# Patient Record
Sex: FEMALE | Race: WHITE | NOT HISPANIC OR LATINO | Employment: OTHER | URBAN - METROPOLITAN AREA
[De-identification: names, ages, dates, MRNs, and addresses within clinical notes are randomized per-mention and may not be internally consistent; named-entity substitution may affect disease eponyms.]

---

## 2018-02-27 ENCOUNTER — OFFICE VISIT CONVERTED (OUTPATIENT)
Dept: PULMONOLOGY | Facility: CLINIC | Age: 58
End: 2018-02-27
Attending: INTERNAL MEDICINE

## 2018-08-22 ENCOUNTER — OFFICE VISIT CONVERTED (OUTPATIENT)
Dept: PULMONOLOGY | Facility: CLINIC | Age: 58
End: 2018-08-22
Attending: INTERNAL MEDICINE

## 2018-11-09 ENCOUNTER — CONVERSION ENCOUNTER (OUTPATIENT)
Dept: OTHER | Facility: HOSPITAL | Age: 58
End: 2018-11-09

## 2018-12-13 ENCOUNTER — OFFICE VISIT CONVERTED (OUTPATIENT)
Dept: PULMONOLOGY | Facility: CLINIC | Age: 58
End: 2018-12-13
Attending: INTERNAL MEDICINE

## 2019-02-19 ENCOUNTER — HOSPITAL ENCOUNTER (OUTPATIENT)
Dept: PERIOP | Facility: HOSPITAL | Age: 59
Setting detail: HOSPITAL OUTPATIENT SURGERY
Discharge: HOME | End: 2019-02-19
Attending: UROLOGY

## 2019-02-27 ENCOUNTER — CONVERSION ENCOUNTER (OUTPATIENT)
Dept: SURGERY | Facility: CLINIC | Age: 59
End: 2019-02-27

## 2019-02-27 ENCOUNTER — HOSPITAL ENCOUNTER (OUTPATIENT)
Dept: SURGERY | Facility: CLINIC | Age: 59
Discharge: HOME | End: 2019-02-27
Attending: UROLOGY

## 2019-02-27 ENCOUNTER — OFFICE VISIT CONVERTED (OUTPATIENT)
Dept: UROLOGY | Facility: CLINIC | Age: 59
End: 2019-02-27
Attending: UROLOGY

## 2019-03-01 LAB
BACTERIA UR CULT: ABNORMAL
CEFEPIME SUSC ISLT: <=1
CEFTAZIDIME SUSC ISLT: <=1
CIPROFLOXACIN SUSC ISLT: <=0.25
GENTAMICIN SUSC ISLT: <=1
LEVOFLOXACIN SUSC ISLT: 0.25
TOBRAMYCIN SUSC ISLT: <=1

## 2019-04-24 ENCOUNTER — HOSPITAL ENCOUNTER (OUTPATIENT)
Dept: CT IMAGING | Facility: HOSPITAL | Age: 59
Discharge: HOME | End: 2019-04-24
Attending: INTERNAL MEDICINE

## 2019-04-24 ENCOUNTER — OFFICE VISIT CONVERTED (OUTPATIENT)
Dept: PULMONOLOGY | Facility: CLINIC | Age: 59
End: 2019-04-24
Attending: INTERNAL MEDICINE

## 2019-06-24 ENCOUNTER — OFFICE VISIT CONVERTED (OUTPATIENT)
Dept: PULMONOLOGY | Facility: CLINIC | Age: 59
End: 2019-06-24
Attending: PHYSICIAN ASSISTANT

## 2019-11-18 ENCOUNTER — HOSPITAL ENCOUNTER (OUTPATIENT)
Dept: GENERAL RADIOLOGY | Facility: HOSPITAL | Age: 59
Discharge: HOME | End: 2019-11-18
Attending: FAMILY MEDICINE

## 2019-11-18 LAB
CHOLEST SERPL-MCNC: 180 MG/DL (ref 107–200)
CHOLEST/HDLC SERPL: 2.2 {RATIO} (ref 3–6)
HDLC SERPL-MCNC: 81 MG/DL (ref 40–60)
LDLC SERPL CALC-MCNC: 78 MG/DL (ref 70–100)
TRIGL SERPL-MCNC: 105 MG/DL (ref 40–150)
VLDLC SERPL-MCNC: 21 MG/DL (ref 5–37)

## 2019-12-17 ENCOUNTER — HOSPITAL ENCOUNTER (OUTPATIENT)
Dept: MAMMOGRAPHY | Facility: HOSPITAL | Age: 59
Discharge: HOME | End: 2019-12-17
Attending: FAMILY MEDICINE

## 2020-05-26 ENCOUNTER — HOSPITAL ENCOUNTER (OUTPATIENT)
Dept: GENERAL RADIOLOGY | Facility: HOSPITAL | Age: 60
Discharge: HOME | End: 2020-05-26
Attending: INTERNAL MEDICINE

## 2021-02-05 ENCOUNTER — HOSPITAL ENCOUNTER (OUTPATIENT)
Dept: MAMMOGRAPHY | Facility: HOSPITAL | Age: 61
Discharge: HOME | End: 2021-02-05
Attending: FAMILY MEDICINE

## 2021-05-16 VITALS
SYSTOLIC BLOOD PRESSURE: 122 MMHG | DIASTOLIC BLOOD PRESSURE: 82 MMHG | HEIGHT: 65 IN | BODY MASS INDEX: 31.86 KG/M2 | WEIGHT: 191.25 LBS

## 2021-05-28 VITALS
RESPIRATION RATE: 16 BRPM | SYSTOLIC BLOOD PRESSURE: 148 MMHG | HEIGHT: 64 IN | SYSTOLIC BLOOD PRESSURE: 130 MMHG | TEMPERATURE: 98.4 F | TEMPERATURE: 98.2 F | TEMPERATURE: 98.4 F | HEIGHT: 64 IN | DIASTOLIC BLOOD PRESSURE: 58 MMHG | HEART RATE: 86 BPM | DIASTOLIC BLOOD PRESSURE: 72 MMHG | HEIGHT: 65 IN | WEIGHT: 196.25 LBS | SYSTOLIC BLOOD PRESSURE: 146 MMHG | RESPIRATION RATE: 16 BRPM | BODY MASS INDEX: 33.56 KG/M2 | WEIGHT: 201.19 LBS | RESPIRATION RATE: 16 BRPM | HEART RATE: 95 BPM | WEIGHT: 208 LBS | DIASTOLIC BLOOD PRESSURE: 86 MMHG | OXYGEN SATURATION: 94 % | DIASTOLIC BLOOD PRESSURE: 78 MMHG | BODY MASS INDEX: 35.51 KG/M2 | SYSTOLIC BLOOD PRESSURE: 130 MMHG | RESPIRATION RATE: 16 BRPM | OXYGEN SATURATION: 93 % | BODY MASS INDEX: 32.7 KG/M2 | TEMPERATURE: 98.4 F | HEIGHT: 65 IN | HEART RATE: 96 BPM | WEIGHT: 196.56 LBS | BODY MASS INDEX: 33.52 KG/M2 | OXYGEN SATURATION: 97 % | HEART RATE: 93 BPM | OXYGEN SATURATION: 96 %

## 2021-05-28 VITALS
WEIGHT: 194.56 LBS | DIASTOLIC BLOOD PRESSURE: 59 MMHG | HEIGHT: 65 IN | SYSTOLIC BLOOD PRESSURE: 152 MMHG | BODY MASS INDEX: 32.42 KG/M2 | OXYGEN SATURATION: 96 % | TEMPERATURE: 98.4 F | HEART RATE: 83 BPM | RESPIRATION RATE: 12 BRPM

## 2021-05-28 NOTE — PROGRESS NOTES
Patient: DENI HOLLEY     Acct: [id number]     Report: #[id number]  UNIT #: [id number]     : 1960    Encounter Date:2018  PRIMARY CARE: BJ DOMINGUEZ  ***Signed***  --------------------------------------------------------------------------------------------------------------------  Chief Complaint      Encounter Date      Aug 22, 2018            Primary Care Provider      BJ DOMINGUEZ            Referring Provider      SELF,REFERRED PATIENT            [facility] follow-up            Patient Complaint      Patient is complaining of      6 month follow up/copd f/u            VITALS      Height 5 ft 4.00 in / 162.56 cm      Weight 208 lbs  / 94.792333 kg      BSA 2.11 m2      BMI 35.7 kg/m2      Temperature 98.4 F / 36.89 C - Oral      Pulse 93      Respirations 16      Blood Pressure 146/78 Sitting, Right Arm      Pulse Oximetry 93%, nasal cannula, 2.5 lpm      Comment: Resting Room Air 96%      Ambulation Room Air Sp02 82%      Ambulation Sp02 98% on 2 liters      Exhaled Nitrous Oxide Testin            HPI      The patient is a 57 year old female with very severe COPD with left lower lobe     pneumonia and recent COPD exacerbations.  She is here for follow up.  Since her     last office visit, she was prescribed Spiriva and Symbicort, but insurance was     not covering her medications. She is currently using nebulizer several times a     day and ProAir albuterol 3-4 times a day.  She has not been smoking since     2017.  She has not needed antibiotics or steroids.  She has no fever, chills,    weight loss or loss of appetite. No coughing up blood, no nausea or vomiting.      She is shortness of breath with minimal activities, wants to change her oxygen     tank to portable oxygen concentrator.  She always feels like she has thrush with    inhaled steroids.  She is waiting to try different one now.  She has a sore     throat with inhalers as well.  Her recent CT scan while she was in the  hospital     for COPD exacerbation showed a 0.3 cm lung nodule and mild emphysematous     changes.  We discussed regarding pulmonary rehab and pulmonary transplant.            ROS      Constitutional:  Complains of: Fatigue; Denies: Fever, Weight gain, Weight loss,    Chills, Insomnia, Other      Respiratory/Breathing:  Complains of: Shortness of air, Wheezing, Cough; Denies:    Hemoptysis, Pleuritic pain, Other      Endocrine:  Denies: Polydipsia, Polyuria, Heat/cold intolerance, Abnorml     menstrual pattern, Diabetes, Other      Eyes:  Denies: Blurred vision, Vision Changes, Other      Ears, nose, mouth, throat:  Denies: Mouth lesions, Thrush, Throat pain,     Hoarseness, Allergies/Hay Fever, Post Nasal Drip, Headaches, Recent Head Injury,    Nose Bleeding, Neck Stiffness, Thyroid Mass, Hearing Loss, Ear Fullness, Dry     Mouth, Nasal or Sinus Pain, Dry Lips, Nasal discharge, Nasal congestion, Other      Cardiovascular:  Denies: Palpitations, Syncope, Claudication, Chest Pain, Wake     up Gasping for air, Leg Swelling, Irregular Heart Rate, Cyanosis, Dyspnea on     Exertion, Other      Gastrointestinal:  Denies: Nausea, Constipation, Diarrhea, Abdominal pain,     Vomiting, Difficulty Swallowing, Reflux/Heartburn, Dysphagia, Jaundice,     Bloating, Melena, Bloody stools, Other      Genitourinary:  Denies: Urinary frequency, Incontinence, Hematuria, Urgency,     Nocturia, Dysuria, Testicular problems, Other      Musculoskeletal:  Denies: Joint Pain, Joint Stiffness, Joint Swelling, Myalgias,    Other      Hematologic/lymphatic:  DENIES: Lymphadenopathy, Bruising, Bleeding tendencies,     Other      Neurological:  Denies: Headache, Numbness, Weakness, Seizures, Other      Psychiatric:  Denies: Anxiety, Appropriate Effect, Depression, Other      Sleep:  No: Excessive daytime sleep, Morning Headache?, Snoring, Insomnia?, Stop    breathing at sleep?, Other      Integumentary:  Denies: Rash, Dry skin, Skin Warm to  Touch, Other      Immunologic/Allergic:  Denies: Latex allergy, Seasonal allergies, Asthma,     Urticaria, Eczema, Other      Immunization status:  No: Up to date            FAMILY/SOCIAL/MEDICAL HX      Surgical History:  Yes: Abdominal Surgery (WOUND INFECTION DEHISCENCE 9/2011     DEBRIDEMENT W/WOUND VAC), Appendectomy (1980'S), Bladder Surgery (ANTERIOR AND     POSTERIOR REPAIR 1980'S), Bowel Surgery (COLONOSCOPY COLON RESECTION 8/24/2011),    Cholecystectomy (6/15/15), Head Surgery (TONSILLECTOMY, SINUS), Nose Surgery     (SINUS SURGERY), Oral Surgery (DENTAL EXTRACTION), Orthopedic Surgery (LEFT     SHOULDER X2), Throat Surgery (tonsilectomy), Other Surgeries; No: AAA Repair,     Angioplasty, Back Surgery, Breast Surgery, CABG, Carotid Stenosis, Ear Surgery,     Eye Surgery, Hernia Surgery, Kidney Surgery, Prostatectomy, Rectal Surgery,     Spinal Surgery, Testicular Surgery, Valve Replacement, Vascular Surgery      Heart - Family Hx:  Father, Sister      Diabetes - Family Hx:  Father, Sister      Cancer/Type - Family Hx:  Father      Is Father Still Living?:  No      Is Mother Still Living?:  No       Family History:  Yes      Social History:  Tobacco Use; No Alcohol Use, No Recreational Drug use      Smoking status:  Former smoker (one pack per day since the age of 14, quit in J    une 2017, 56-gcyi-bpmj history)      Smoking history:  25-50 pack years      Occupation:  unemployed, previously was a       Whom do you live with?:        Hysterectomy:  Yes      Anticoagulation Therapy:  No      Antibiotic Prophylaxis:  Yes      Medical History:  Yes: Arthritis (LEFT SHOULDER), Asthma, Chronic     Bronchitis/COPD (HOME NEBS PRN), Depression, Anxiety, Hemorrhoids/Rectal Prob     (DIVERTICULITIS, IRRITABLE BOWEL SYNDROME), Skin Disease/Psoriais/Ecz,     Miscellaneous Medical/oth (ABDOMINAL WOUND INFECTION DEBRIDEMENT X3 , VENTRAL     HERNIA REPAIRX2); No: Alcoholism, Allergies, Anemia, Blood  Disease, Broken     Bones, Cataracts, Chemical Dependency, Chemotherapy/Cancer, Emphysema, Chronic     Liver Disease, Colon Trouble, Colitis, Diverticulitis, Congestive Heart Failu,     Deafness or Ringing Ears, Convulsions, Bipolar Disorder, Diabetes, Epilepsy,     Seizures, Forgetfullness, Glaucoma, Gall Stones, Gout, Head Injury, Heart     Attack, Heart Murmur, Hepatitis, Hiatal Hernia, High Blood Pressure, High     Cholesterol, HIV (Do not ask - volu, Jaundice, Kidney or Bladder Disease, Kidney    Stones, Migrane Headaches, Mitral Valve Prolapse, Night sweats, Phlebitis,     Psychiatric Care, Reflux Disease, Rheumatic Fever, Sexually Transmitted Dis,     Shortness Of Breath (AT AGE 13 SPONTANEOUS PNEUMOTHORAX-CHEST TUBE), Sinus     Trouble, Stroke, Thyroid Problem, Tuberculosis or Pos TB Te      Psychiatric History      depression and anxiety            PREVENTION      Hx Influenza Vaccination:  Yes      Date Influenza Vaccine Given:  Nov 1, 2017      Influenza Vaccine Declined:  No      2 or More Falls Past Year?:  No      Fall Past Year with Injury?:  No      Hx Pneumococcal Vaccination:  Yes      Encouraged to follow-up with:  PCP regarding preventative exams.      Chart initiated by      shamar mendez ma            ALLERGIES/MEDICATIONS      Allergies:        Coded Allergies:             LEVOFLOXACIN (Verified  Allergy, Severe, REDNESS, SWELLING, 8/22/18)           MORPHINE (Verified  Allergy, Severe, ARM SWELLING, HIVES, RED STREAKS,     8/22/18)           PENICILLINS (Verified  Allergy, Severe, ITCHING, HIVES AND SOA, 8/22/18)                  11-16: SPOKE WITH RN; PT STATES CAN TAKE CEPHALOSPORINS;                  CHECKED PREVIOUS ADMISSIONS               IN THE PAST W/O RX           QUINOLONES (Verified  Allergy, Severe, edema and red streaks on arm,     8/22/18)           AMOXICILLIN TRIHYDRATE (Verified  Allergy, Unknown, RASH,ITCHES, 8/22/18)           POTASSIUM CLAVULANATE (Verified  Allergy,  Unknown, RASH,ITCHES, 8/22/18)           ZOLPIDEM TARTRATE (Verified  Allergy, Unknown, UNK, 8/22/18)           ADHESIVE (Verified  Adverse Reaction, Severe, BLISTERS,RED, 8/22/18)           OXYCODONE HCL (Verified  Adverse Reaction, Unknown, CONFUSION AND     HALLUCINATIONS, 8/22/18)           OXYCODONE TEREPHTHALATE (Verified  Adverse Reaction, Unknown, CONFUSION AND    HALLUCINATIONS, 8/22/18)           ZOLPIDEM (Verified  Adverse Reaction, Unknown, INCREASED RESTLESSNESS,     8/22/18)      Medications    Last Reconciled on 8/22/18 10:16 by LUCIANO KRISHNAMURTHY MD      Beclomethasone Dipropionate (Qvar 80 Redihaler 10.6 GM) 10.6 Gm Hfa.aeroba      1 PUFF INH RTBID, #1 INH 9 Refills         Prov: Luciano Krishnamurthy         8/22/18       Glycopyrrolate/Formoterol Fum (Bevespi Aerosphere Inhaler) 10.7 Gm Hfa.aer.ad      2 PUFFS INH BID, #1 HFA.AER.AD 9 Refills         Prov: Luciano Krishnamurthy         8/22/18       Roflumilast (Daliresp) 500 Mcg Tab      500 MCG PO QDAY for 30 Days, #30 TAB 9 Refills         Prov: Luciano Krishnamurthy         8/22/18       Beclomethasone Dipropionate (QNASL) 8.7 Gm Hfa.aer.ad      1 PUFFS NARE EACH QDAY, #1 BOTTLE 9 Refills         Prov: Luciano Krishnamurthy         8/22/18       Neb-Budesonide (Budesonide) 0.5 Mg/2 Ml Ampul.neb      0.5 MG INH RTQDAY, #30 NEB         Reported         8/22/18       Aspirin (Aspirin Baby *) 81 Mg Tab.chew      81 MG PO QDAY, #30 TAB.CHEW 0 Refills         Reported         4/16/18       Roflumilast (Daliresp) 500 Mcg Tab      500 MCG PO QDAY for 90 Days, #90 TAB 1 Refill         Prov: Luciano Krishnamurthy         3/6/18       Azelastine Hcl (Azelastine Nasal*) 137 Mcg/0.137 Ml Spray.pump      2 PUFFS NARE EACH BID, #1 BOTTLE 9 Refills         Prov: Luciano Krishnamurthy         2/27/18       Montelukast Sodium (Singulair*) 10 Mg Tab      10 MG PO HS, #30 TAB 9 Refills         Prov: uLciano Krishnamurthy         2/27/18       Albuterol/Ipratropium (Duoneb) 3 Ml Ampul.neb      3 ML INH Q4H PRN for SHORTNESS OF BREATH,  #120 NEB 2 Refills         Prov: Luciano Krishnamurthy         11/17/17       Promethazine Hcl (Phenergan*) 25 Mg Tablet      25 MG PO Q4-6H PRN for NAUSEA, TAB 0 Refills         Reported         11/16/17       Cetirizine Hcl (ZyrTEC*) 10 Mg Tablet      10 MG PO QDAY, #30 TAB 0 Refills         Reported         7/28/17       chlordiazePOXIDE HCl (Librium*) 25 Mg Capsule      25 MG PO TID, CAP         Reported         7/28/17       Potassium Chloride (Klor-Con) 8 Meq Tablet.er      8 MEQ PO QDAY, TAB         Reported         7/28/17       Desvenlafaxine Succinate (Pristiq) 50 Mg Tab.er.24h      50 MG PO QDAY, TAB.SR         Reported         7/28/17       Albuterol (Proair HFA*) 8.5 Gm Inh      2 PUFFS INH RTQ4H, #1 INH 0 Refills         Reported         7/28/17       Furosemide* (Lasix*) 40 Mg Tablet      40 MG PO QDAY PRN for EDEMA/SWELLING, #30 TAB 0 Refills         Reported         7/28/17       Dicyclomine HCl (Bentyl) 20 Mg Tablet      20 MG PO Q6H PRN for ABDOMINAL CRAMPING, #30 TAB         Reported         7/28/17       Diclofenac Sodium (Diclofenac Sodium) 75 Mg Tablet.dr      75 MG PO BID, #60 TAB 0 Refills         Reported         7/28/17       QUEtiapine (SEROquel) 100 Mg Tab      100 MG PO BID, TAB         Reported         6/12/15      Current Medications      Current Medications Reviewed 8/22/18            EXAM      CONSTITUTIONAL: Pleasant but anxious female in mild respiratory distress,     audible wheezing and rhonchi.         EYES : Pink conjunctive, no ptosis, PERRL.       ENMT : Nose and ears appear normal, normal dentition, mild posterior pharyngeal     wall erythema. Mallampati classification III, no sinus tenderness.       Neck: Nontender, no masses, no thyromegaly, no nodules.      Resp : Bilateral significant diminished breath sounds, resonant to percussion     bilaterally, expiratory wheezing, no crackles bilateral scattered rhonchi.      CVS  : No carotid bruits, s1s2 nl, RRR, no murmur, rubs or  gallop, no peripheral    edema       Chest wall: Normal rise with inspiration, nontender on palpation. Increased AP     diameter.       GI   : Abdomen soft, with no masses, no hepatosplenomegaly, no hernias, BS+      MSK  : Normal gait and station, no digital cyanosis or clubbing       Skin : No rashes, ulcerations or lesions, normal turgor and temperature      Neuro: CN II - XII intact, no sensory deficits, DTRs intact and symmetrical, no     motor weakness      Psych: Appropriate affect, A   Vtials      Vitals:             Height 5 ft 4.00 in / 162.56 cm           Weight 208 lbs  / 94.720535 kg           BSA 2.11 m2           BMI 35.7 kg/m2           Temperature 98.4 F / 36.89 C - Oral           Pulse 93           Respirations 16           Blood Pressure 146/78 Sitting, Right Arm           Pulse Oximetry 93%, nasal cannula, 2.5 lpm           Comment: Resting Room Air 96%      Ambulation Room Air Sp02 82%      Ambulation Sp02 98% on 2 liters            REVIEW      Results Reviewed      P[facility]S Results Reviewed?:  Yes Prev Lab Results, Yes Prev Radiology Results, Yes P    revious [facility] Records      Lab Results      The patient's blood work from 2018 from hospitalization was reviewed which    showed normal CBC, normal renal profile and sputum culture was negative.      Radiographic Results               [facility]                PACS RADIOLOGY REPORT            Patient: DENI HOLLEY   Acct: #[id number]   Report: #[id number]            UNIT #: [id number]    DOS: 18 1413      INSURANCE:[employer] MEDICARE [facility]   ORDER #:[id number]      LOCATION:[address] [place] [address]   : 1960            PROVIDERS      ADMITTING:  Henok Johnson   ATTENDING: Henok Johnson      FAMILY:  BJ DOMINGUEZ   ORDERING:  Henok Johnson         OTHER:    DICTATING:  Shayne Faust MD            REQ #:[id number]   EXAM:[facility] - CT CHEST without CONTRAST      REASON FOR EXAM:  copd        REASON FOR VISIT:  COPD EXACERBATION            *******Signed******         PROCEDURE:   CT CHEST WITHOUT CONTRAST             COMPARISON:   [facility], CT, CHEST W/ CONTRAST, 3/14/2017, 2:01.     [facility], CT, CHEST W/O CONTRAST, 12/04/2017, 13:50.             INDICATIONS:   COPD EXACERBATION             TECHNIQUE:   CT images were created without the administration of contrast     material.               PROTOCOL:     Standard imaging protocol performed                RADIATION:     DLP: 673mGy*cm          Automated exposure control was utilized to minimize radiation dose.              FINDINGS:         No adenopathy or effusion is evident.             Mild emphysematous changes are noted.  There is a 0.3 cm nodule in the right     lower lobe on image       45, stable since 3/14/2017.  A small focus of irregular density in the inferior     lingula is       suspected to represent atelectasis or a small infiltrate.  The lungs are     otherwise clear.             A cholecystectomy has been performed.  No focal osseous abnormality is     identified.             CONCLUSION:         1. Mild emphysematous changes      2. Stable 0.3 cm nodule in the right lower lobe.  Consider followup chest CT in     1 year to confirm       long-term stability period      3. Previous cholecystectomy              Shayne Faust M.D.             Electronically Signed and Approved By: Shayne Faust M.D. on 4/16/2018 at 20:35                           Until signed, this is an unconfirmed preliminary report that may contain      errors and is subject to change.                                              ROBRRO:      D:04/16/18 2035      PFT Results      [id number]  [id number]                                 [facility]                          [employer]                            [place], [place]  [address]                __________________________________________________________________________             Patient Name:                   Attending Physician:      Callie Basilio              VIELKA BOUCHER             Patient Visit # MR #            Admit Date  Disch Date     Location      [id number]    [id number]      11/01/2017                 [facility]- -             Date of Birth      1960      __________________________________________________________________________      0821 - DIAGNOSTIC REPORT             PULMONARY FUNCTION TEST             SPIROMETRY:      Spirometry shows very severe obstructive defect.      FEV1/FVC ratio is 45%.      FEV1 is 0.77 L, 29% of predicted.      FVC is 1.73 L, 51% of predicted.             BRONCHODILATOR RESPONSE:      There is a significant response to bronchodilator administration. FEV1      increased from 0.77 L to 0.89 L, 18% increase. FVC increased from 1.73 L to      2.04 L, 18% increase.             LUNG VOLUMES:      Lung volumes show air trapping.      Total lung capacity is 5.84 L, 111% of predicted.      Residual volume is 3.75 L, 192% of predicted.             FLOW VOLUME LOOP:      Flow volume loop is consistent with severe obstructive process.             DIFFUSION:      The patient could not do diffusion capacity even on several attempts.             CONCLUSION:      Low FEV1/FVC with low FEV1 and FVC with air trapping. It suggests very severe      obstructive airway disease, likely emphysema. There is some reversible      component to her obstructive airway disease. Please correlate clinically.             To be electronically signed in [employer]      [id number] LATOYA KRISHNAMURTHY M.D.             NK:korin      D:  11/03/2017 13:35      T:  11/03/2017 15:00      #[id number]             Until signed, this is an unconfirmed preliminary report that may contain      errors and is subject to change.                   11/08/17 1308  <Electronically signed by Latoya Krishnamurthy MD>            Assessment       Pneumonia         Aspiration pneumonia, unspecified aspiration pneumonia type, unspecified        laterality, unspecified part of lung - J69.0         Aspiration pneumonia type: unspecified         Laterality: unspecified laterality         Lung location: unspecified part of lung            COPD (chronic obstructive pulmonary disease)         Centrilobular emphysema - J43.2         Emphysema type: centrilobular            Notes      New Medications      * Neb-Budesonide (Budesonide) 0.5 MG/2 ML AMPUL.NEB: 0.5 MG INH RTQDAY #30         Instructions: DIAGNOSIS CODE REQUIRED PRIOR TO PRESCRIBING.      * BECLOMETHASONE DIPROPIONATE (QNASL) 8.7 GM HFA.AER.AD: 1 PUFFS NARE EACH QDAY       #1      * ROFLUMILAST (Daliresp) 500 MCG TAB: 500 MCG PO QDAY 30 Days #30      * Glycopyrrolate/Formoterol Fum (Bevespi Aerosphere Inhaler) 10.7 GM HFA.AER.AD:      2 PUFFS INH BID #1      * Beclomethasone Dipropionate (Qvar 80 Redihaler 10.6 GM) 10.6 GM HFA.AEROBA: 1       PUFF INH RTBID #1      * Fluticasone Furoate (Arnuity Ellipta) 200 MCG BLST.W.DEV: 1 PUFF INH RTQDAY #1         Instructions: to replace qvar      Discontinued Medications      * TIOTROPIUM BROMIDE (Spiriva Respimat 2.5 mcg/Puff) 4 GM MIST.INHAL: 2 PUFFS       INH RTQDAY #1      * Budesonide/Formoterol Fumarate (Symbicort 160/4.5 Mcg) 10.2 GM INH: 2 PUFF INH      BID #1      * predniSONE* (Deltasone*) 10 MG TABLET: 10 MG PO QDAY 12 Days #30         Instructions: Instructions: 10nto8r,51gkr1e,02fhy0w,27dis5c      New Referrals      * Pulmonary Rehab, Routine         Rehabilitation         Status changed from Active to Complete.         Dx: Pneumonia - J18.9      PLAN:      The patient is a 58 year old female with very severe chronic obstructive pul    monary disease with recurrent chronic obstructive pulmonary disease exacerbation    and chronic hypoxic respiratory failure.              1. Chronic obstructive pulmonary disease with recurrent exacerbations.  Given      her significant problems she needs to be back on maintenance inhalers.  I will     start bevespi twice a day with a spacer and also start Qvar 18 mcg twice a day.     Use albuterol and DuoNeb nebulizer as needed.              2. Chronic hypoxic respiratory failure. Continue oxygen with sleep and     activities.  We will check six minute walk test in office today.  She has oxygen    tank and she needs to be mobile. She will benefit from portable oxygen hernan    ntrator and wants to switch to [employer].              3. Postnasal drip and chronic sinusitis. She says Flonase does not help.  She     wants to try QNASL as she tried in the past and it did help. We will write a     script for QNASL.  Continue with Singulair, continue with Daliresp.  Pulmonary     rehab referral was made and she is wiling to go for it.  Lung transplant     evaluation discussion was made today, she wants to hold off on it for now.              4. I will follow up with her in 3-4 months, earlier if needed.  We will assess     vaccinations next office visit.            Patient Education      Education resources provided:  Yes      Patient Education Provided:  Acute Bronchitis, COPD            Patient Education:        Chronic Obstructive Pulmonary Disease                 Disclaimer: Converted document may not contain table formatting or lab diagrams. Please see [employer] for the authenticated document.

## 2021-05-28 NOTE — PROGRESS NOTES
Patient: DENI HOLLEY     Acct: [id number]     Report: #[id number]  UNIT #: [id number]     : 1960    Encounter Date:2018  PRIMARY CARE: BJ DOMINGUEZ  ***Signed***  --------------------------------------------------------------------------------------------------------------------  Chief Complaint      Encounter Date      2018            Referring Provider      SELF,REFERRED PATIENT      Referring Provider not in look:        [facility] follow-up            Patient Complaint      Patient is complaining of      2 month follow up            VITALS      Height 5 ft 4 in / 162.56 cm      Weight 196 lbs 9 oz / 89.587100 kg      BSA 2.04 m2      BMI 33.7 kg/m2      Temperature 98.4 F / 36.89 C - Oral      Pulse 96      Respirations 16      Blood Pressure 130/58 Sitting, Right Arm      Pulse Oximetry 96%, room air      Exhaled Nitrous Oxide Testin            HPI      The patient is a 57 year old female with history of  very severe chronic     obstructive pulmonary disease with left lower lobe pneumonia and recurrent     chronic obstructive pulmonary disease exacerbations.  She is here for follow     up.             Since her last office visit she was written for Perforomist and Pulmicort but     those medications were not covered and she is currently not using them. She is     using Spiriva once daily and albuterol 2-3 times a day. She also uses DuoNeb     nebulizer 2-3 times a day. She has been on Daliresp which seems to help. She     continues to use oxygen with sleep. She has had recurrent sinus infections and     has been treated with Rocephin and Solu-Medrol by her primary care provider.     She was also given Bactrim and Prednisone by her primary care provider     recently. She has no weight loss or loss of appetite, no coughing up blood. No     nausea and vomiting. Her symptoms have somewhat improved since her last office     visit. Her breathing is at baseline, maybe slightly improved.   She has no     wheezing but she has some cough with minimal sputum production and some     shortness of breath with exertion.            ROS      Constitutional:  Complains of: Fatigue, Denies: Fever, Weight gain, Weight loss    , Chills, Insomnia, Other      Respiratory/Breathing:  Complains of: Shortness of air, Wheezing, Cough, Denies    : Hemoptysis, Pleuritic pain, Other      Endocrine:  Denies: Polydipsia, Polyuria, Heat/cold intolerance, Abnorml     menstrual pattern, Diabetes, Other      Eyes:  Denies: Blurred vision, Vision Changes, Other      Ears, nose, mouth, throat:  Denies: Mouth lesions, Thrush, Throat pain,     Hoarseness, Allergies/Hay Fever, Post Nasal Drip, Headaches, Recent Head Injury    , Nose Bleeding, Neck Stiffness, Thyroid Mass, Hearing Loss, Ear Fullness, Dry     Mouth, Nasal or Sinus Pain, Dry Lips, Nasal discharge, Nasal congestion, Other      Cardiovascular:  Denies: Palpitations, Syncope, Claudication, Chest Pain, Wake     up Gasping for air, Leg Swelling, Irregular Heart Rate, Cyanosis, Dyspnea on     Exertion, Other      Gastrointestinal:  Denies: Nausea, Constipation, Diarrhea, Abdominal pain,     Vomiting, Difficulty Swallowing, Reflux/Heartburn, Dysphagia, Jaundice, Bloating    , Melena, Bloody stools, Other      Genitourinary:  Denies: Urinary frequency, Incontinence, Hematuria, Urgency,     Nocturia, Dysuria, Testicular problems, Other      Musculoskeletal:  Denies: Joint Pain, Joint Stiffness, Joint Swelling, Myalgias    , Other      Hematologic/lymphatic:  DENIES: Lymphadenopathy, Bruising, Bleeding tendencies,     Other      Neurological:  Denies: Headache, Numbness, Weakness, Seizures, Other      Psychiatric:  Denies: Anxiety, Appropriate Effect, Depression, Other      Sleep:  No: Excessive daytime sleep, Morning Headache?, Snoring, Insomnia?,     Stop breathing at sleep?, Other      Integumentary:  Denies: Rash, Dry skin, Skin Warm to Touch, Other       Immunologic/Allergic:  Denies: Latex allergy, Seasonal allergies, Asthma,     Urticaria, Eczema, Other      Immunization status:  No: Up to date            FAMILY/SOCIAL/MEDICAL HX      Surgical History:  Yes: Abdominal Surgery (WOUND INFECTION DEHISCENCE 9/2011     DEBRIDEMENT W/WOUND VAC), Appendectomy (1980'S), Bladder Surgery (ANTERIOR AND     POSTERIOR REPAIR 1980'S), Bowel Surgery (COLONOSCOPY COLON RESECTION 8/24/2011)    , Cholecystectomy (6/15/15), Head Surgery (TONSILLECTOMY, SINUS), Nose Surgery (    SINUS SURGERY), Oral Surgery (DENTAL EXTRACTION), Orthopedic Surgery (LEFT     SHOULDER X2), Throat Surgery (tonsilectomy), Other Surgeries, No: AAA Repair,     Angioplasty, Back Surgery, Breast Surgery, CABG, Carotid Stenosis, Ear Surgery,     Eye Surgery, Hernia Surgery, Kidney Surgery, Prostatectomy, Rectal Surgery,     Spinal Surgery, Testicular Surgery, Valve Replacement, Vascular Surgery      Heart - Family Hx:  Father, Sister      Diabetes - Family Hx:  Father, Sister      Cancer/Type - Family Hx:  Father      Is Father Still Living?:  No      Is Mother Still Living?:  No      Social History:  Tobacco Use, No Alcohol Use, No Recreational Drug use      Smoking status:  Former smoker (one pack per day since the age of 14, quit in     June 2017, 43-pack-year history)      Smoking history:  25-50 pack years      Occupation:  unemployed, previously was a       Whom do you live with?:        Hysterectomy:  Yes      Anticoagulation Therapy:  No      Antibiotic Prophylaxis:  Yes      Medical History:  Yes: Arthritis (LEFT SHOULDER), Asthma, Chronic Bronchitis/    COPD (HOME NEBS PRN), Depression, Anxiety, Hemorrhoids/Rectal Prob (    DIVERTICULITIS, IRRITABLE BOWEL SYNDROME), High Blood Pressure (HYPOTENSION ON     OCCASSION), Shortness Of Breath (AT AGE 13 SPONTANEOUS PNEUMOTHORAX-CHEST TUBE)    , Skin Disease/Psoriais/Ecz, Miscellaneous Medical/oth (ABDOMINAL WOUND     INFECTION  DEBRIDEMENT X3 , VENTRAL HERNIA REPAIR), No: Alcoholism, Allergies,     Anemia, Blood Disease, Broken Bones, Cataracts, Chemical Dependency,     Chemotherapy/Cancer, Emphysema, Chronic Liver Disease, Colon Trouble, Colitis,     Diverticulitis, Congestive Heart Failu, Deafness or Ringing Ears, Convulsions,     Bipolar Disorder, Diabetes, Epilepsy, Seizures, Forgetfullness, Glaucoma, Gall     Stones, Gout, Head Injury, Heart Attack, Heart Murmur, Hepatitis, Hiatal Hernia    , High Cholesterol, HIV (Do not ask - volu, Jaundice, Kidney or Bladder Disease    , Kidney Stones, Migrane Headaches, Mitral Valve Prolapse, Night sweats,     Phlebitis, Psychiatric Care, Reflux Disease, Rheumatic Fever, Sexually     Transmitted Dis, Sinus Trouble, Stroke, Thyroid Problem, Tuberculosis or Pos TB     Te            Hx Influenza Vaccination:  Yes      Date Influenza Vaccine Given:  Nov 1, 2017      Influenza Vaccine Declined:  No      2 or More Falls Past Year?:  No      Fall Past Year with Injury?:  No      Hx Pneumococcal Vaccination:  Yes      Encouraged to follow-up with:  PCP regarding preventative exams.      Chart initiated by      shamar mendez ma            ALLERGIES/MEDICATIONS      Allergies:        Coded Allergies:             LEVOFLOXACIN (Verified  Allergy, Severe, REDNESS, SWELLING, 2/27/18)           MORPHINE (Verified  Allergy, Severe, ARM SWELLING, HIVES, RED STREAKS, 2/27 /18)           PENICILLINS (Verified  Allergy, Severe, ITCHING, HIVES AND SOA, 2/27/18)       11-16: SPOKE WITH RN; PT STATES CAN TAKE CEPHALOSPORINS;       CHECKED PREVIOUS ADMISSIONS    IN THE PAST W/O RX           QUINOLONES (Verified  Allergy, Severe, edema and red streaks on arm, 2/27/ 18)           AMOXICILLIN TRIHYDRATE (Verified  Allergy, Unknown, RASH,ITCHES, 2/27/18)           POTASSIUM CLAVULANATE (Verified  Allergy, Unknown, RASH,ITCHES, 2/27/18)           ZOLPIDEM TARTRATE (Verified  Allergy, Unknown, UNK, 2/27/18)            ADHESIVE (Verified  Adverse Reaction, Severe, BLISTERS,RED, 2/27/18)           OXYCODONE HCL (Verified  Adverse Reaction, Unknown, CONFUSION AND     HALLUCINATIONS, 2/27/18)           OXYCODONE TEREPHTHALATE (Verified  Adverse Reaction, Unknown, CONFUSION     AND HALLUCINATIONS, 2/27/18)           ZOLPIDEM (Verified  Adverse Reaction, Unknown, INCREASED RESTLESSNESS, 2/27 /18)      Medications    Last Reconciled on 2/27/18 09:17 by LUCIANO KRISHNAMURTHY MD      Azelastine Hcl (Azelastine Nasal*) 137 Mcg/0.137 Ml Spray.pump      2 PUFFS NARE EACH BID, #1 BOTTLE 9 Refills         Prov: Luciano Krishnamurthy         2/27/18       Montelukast Sodium (Singulair*) 10 Mg Tab      10 MG PO HS, #30 TAB 9 Refills         Prov: Luciano Krishnamurthy         2/27/18       Budesonide/Formoterol Fumarate (Symbicort 160/4.5 Mcg) 10.2 Gm Inh      2 PUFF INH BID, #1 INH 9 Refills         Prov: Luciano Krishnamurthy         2/27/18       Fluconazole (Diflucan) 150 Mg Tablet      150 MG PO ONCE, #10 TAB         Prov: Luciano Krishnamurthy         2/27/18       Tiotropium Bromide (Spiriva Respimat 2.5 mcg/Puff) 4 Gm Mist.inhal      2 PUFFS INH RTQDAY, #1 MDI 9 Refills         Prov: Luciano Krishnamurthy         2/27/18       Tiotropium Bromide (Spiriva Respimat 1.25 mcg/puff) 4 Gm Mist.inhal      2 PUFFS INH RTQDAY, #1 INH 0 Refills         Reported         2/27/18       Formoterol Fumarate (Perforomist) 20 Mcg/2 Ml Vial.neb      20 MCG INH BID, #60 ML 9 Refills         Prov: Luciano Krishnamurthy         11/17/17       Albuterol/Ipratropium (Duoneb) 3 Ml Ampul.neb      3 ML INH Q4H Y for SHORTNESS OF BREATH, #120 NEB 2 Refills         Prov: Luciano Krishnamurthy         11/17/17       Promethazine Hcl (Phenergan*) 25 Mg Tablet      25 MG PO Q4-6H Y for NAUSEA, TAB 0 Refills         Reported         11/16/17       Roflumilast (Daliresp) 500 Mcg Tab      500 MCG PO QDAY for 30 Days, #30 TAB         Reported         11/9/17       Cetirizine Hcl (ZyrTEC*) 10 Mg Tablet      10 MG PO QDAY, #30 TAB 0 Refills          Reported         7/28/17       chlordiazePOXIDE HCl (Librium*) 25 Mg Capsule      25 MG PO TID, CAP         Reported         7/28/17       Potassium Chloride (Klor-Con) 8 Meq Tablet.er      8 MEQ PO BID Y for EDEMA/SWELLING, TAB         Reported         7/28/17       Desvenlafaxine Succinate (Pristiq) 50 Mg Tab.er.24h      50 MG PO QDAY, TAB.SR         Reported         7/28/17       Albuterol (Proair HFA*) 8.5 Gm Inh      2 PUFFS INH RTQ4H, #1 INH 0 Refills         Reported         7/28/17       Furosemide* (Lasix*) 40 Mg Tablet      40 MG PO QDAY Y for EDEMA/SWELLING, #30 TAB 0 Refills         Reported         7/28/17       Dicyclomine HCl (Bentyl) 20 Mg Tablet      20 MG PO QDAY Y for ABDOMINAL CRAMPING, #30 TAB         Reported         7/28/17       Diclofenac Sodium (Diclofenac Sodium) 75 Mg Tablet.dr      75 MG PO BID, #60 TAB 0 Refills         Reported         7/28/17       Aspirin (Aspirin Baby *) 81 Mg Tab.chew      81 MG PO QDAY, #100 TAB.CHEW 0 Refills         Prov: MAJO BAZAN         3/16/17       QUEtiapine (SEROquel) 100 Mg Tab      100 MG PO BID, TAB         Reported         6/12/15      Current Medications      Current Medications Reviewed 2/27/18            EXAM      CONSTITUTIONAL: Pleasant but anxious female in mild respiratory distress,     audible wheezing and rhonchi.         EYES : Pink conjunctive, no ptosis, PERRL.       ENMT : Nose and ears appear normal, normal dentition, mild posterior pharyngeal     wall erythema. Mallampati classification III, no sinus tenderness.       Neck: Nontender, no masses, no thyromegaly, no nodules.      Resp : Bilateral significant diminished breath sounds, resonant to percussion     bilaterally, expiratory wheezing, no crackles bilateral scattered rhonchi.      CVS  : No carotid bruits, s1s2 nl, RRR, no murmur, rubs or gallop, no     peripheral edema       Chest wall: Normal rise with inspiration, nontender on palpation. Increased AP     diameter.        GI   : Abdomen soft, with no masses, no hepatosplenomegaly, no hernias, BS+      MSK  : Normal gait and station, no digital cyanosis or clubbing       Skin : No rashes, ulcerations or lesions, normal turgor and temperature      Neuro: CN II - XII intact, no sensory deficits, DTRs intact and symmetrical, no     motor weakness      Psych: Appropriate affect, A   Vtials      Vitals:             Height 5 ft 4 in / 162.56 cm           Weight 196 lbs 9 oz / 89.696219 kg           BSA 2.04 m2           BMI 33.7 kg/m2           Temperature 98.4 F / 36.89 C - Oral           Pulse 96           Respirations 16           Blood Pressure 130/58 Sitting, Right Arm           Pulse Oximetry 96%, room air            REVIEW      Results Reviewed      PCCS Results Reviewed?:  Yes Prev Lab Results, Yes Prev Radiology Results, Yes     Previous [facility] Records      Lab Results      The patient's previous bronchoscopy cultures was reviewed which showed only     candida albicans but no significant bacterial etiology for recurrent     exacerbations.      Radiographic Results               [facility]                PACS RADIOLOGY REPORT            Patient: DENI HOLLEY   Acct: #[id number]   Report: #[id number]            UNIT #: [id number]    DOS: 17 1017      INSURANCE:[employer] KRS   ORDER #:[id number]      LOCATION:[address] [place] [address]   : 1960            PROVIDERS      ADMITTING:  Ian Fowler   ATTENDING: Ian Fowler      FAMILY:  BJ DOMINGUEZ   ORDERING:  ROCAEL KAISER         OTHER:    DICTATING:  Rylan Lord MD            REQ #:[id number]    CPT CODE:40681   EXAM:WO - CT CHEST without CONTRAST      REASON FOR EXAM:  hypoxia      REASON FOR VISIT:  COPD            *******Signed******         PROCEDURE:   CT CHEST WITHOUT CONTRAST             COMPARISON:   [facility], OT[facility], CHEST AP/PA 1 VIEW, 2017, 18:    52.  [facility], CT, CHEST W/O CONTRAST, 11/09/2017, 11:20.             INDICATIONS:   hypoxia/ COPD             TECHNIQUE:   CT images were created without the administration of contrast     material.               PROTOCOL:     Standard imaging protocol performed                RADIATION:     DLP: 897mGy*cm          Automated exposure control was utilized to minimize radiation dose.              FINDINGS:         There is mild debris within the left distal trachea, while the remainder the     central       tracheobronchial tree is clear. Mild to moderate emphysema is redemonstrated.     There is a bulky       calcified granuloma in the right lower lobe. There appear to be several new     tiny tree-in-bud       nodules along the peripheral right lower lobe and to a lesser extent within the     medial left lower       lobe. No focal airspace consolidation is identified. There is no pleural     effusion.             The heart size appears normal. The great vessels are normal in caliber. No     abnormally enlarged       lymph nodes are identified.             Partial evaluation of the upper abdomen is unremarkable.             No aggressive osseous lesions are identified.             CONCLUSION:         1. Several new tiny tree-in-bud nodules within bilateral lower lobes,     suggestive of a mild       infectious or inflammatory process, perhaps mild aspiration.      2. Mild to moderate emphysema.              FAMILIA LOCO MD             Electronically Signed and Approved By: FAMILIA LOCO MD on 12/04/2017 at 14:    42                        Until signed, this is an unconfirmed preliminary report that may contain      errors and is subject to change.                                              RODD1:      D:12/04/17 1442      PFT Results      [id number]  [id number]                                 [facility]                          [facility] [employer]                             [place]  [id number]               __________________________________________________________________________             Patient Name:                   Attending Physician:      Callie BasilioELZA SPENCERON             Patient Visit # MR #            Admit Date  Disch Date     Location      [id number]    [id number]      11/01/2017                 [facility] -             Date of Birth      1960      __________________________________________________________________________      0821 - DIAGNOSTIC REPORT             PULMONARY FUNCTION TEST             SPIROMETRY:      Spirometry shows very severe obstructive defect.      FEV1/FVC ratio is 45%.      FEV1 is 0.77 L, 29% of predicted.      FVC is 1.73 L, 51% of predicted.             BRONCHODILATOR RESPONSE:      There is a significant response to bronchodilator administration. FEV1      increased from 0.77 L to 0.89 L, 18% increase. FVC increased from 1.73 L to      2.04 L, 18% increase.             LUNG VOLUMES:      Lung volumes show air trapping.      Total lung capacity is 5.84 L, 111% of predicted.      Residual volume is 3.75 L, 192% of predicted.             FLOW VOLUME LOOP:      Flow volume loop is consistent with severe obstructive process.             DIFFUSION:      The patient could not do diffusion capacity even on several attempts.             CONCLUSION:      Low FEV1/FVC with low FEV1 and FVC with air trapping. It suggests very severe      obstructive airway disease, likely emphysema. There is some reversible      component to her obstructive airway disease. Please correlate clinically.             To be electronically signed in [employer]      [id number] LATOYA CHRISTIAN M.D.             NK:korin      D:  11/03/2017 13:35      T:  11/03/2017 15:00      #[id number]             Until signed, this is an unconfirmed preliminary report that may contain      errors and is subject to change.                   11/08/17  1308  <Electronically signed by Luciano Krishnamurthy MD>            PLAN      Assessment      Notes      New Medications      * TIOTROPIUM BROMIDE (Spiriva Respimat 1.25 mcg/puff) 4 GM MIST.INHAL: 2 PUFFS     INH RTQDAY #1      * TIOTROPIUM BROMIDE (Spiriva Respimat 2.5 mcg/Puff) 4 GM MIST.INHAL: 2 PUFFS     INH RTQDAY #1      * Fluconazole (Diflucan) 150 MG TABLET: 150 MG PO ONCE #10      * Budesonide/Formoterol Fumarate (Symbicort 160/4.5 Mcg) 10.2 GM INH: 2 PUFF     INH BID #1      * Montelukast Sodium (Singulair*) 10 MG TAB: 10 MG PO HS #30      * AZELASTINE HCL (Azelastine Nasal*) 137 MCG/0.137 ML SPRAY.PUMP: 2 PUFFS NARE     EACH BID #1      * ROFLUMILAST (Daliresp) 500 MCG TAB: 500 MCG PO QDAY 90 Days #90       Instructions: diag:j44.9 npi:[id number]      Discontinued Medications      * predniSONE* (Deltasone*) 10 MG TABLET: 10 MG PO ASDIR #45       Instructions: [id number],81mzg7j,18fqb4u,83piz8u,17ljc6f      * Fluconazole 150 MG TABLET: 150 MG PO QDAY #10      * Doxycycline Hyclate (Doxycycline Hyclate*) 100 MG CAPSULE: 100 MG PO BID 4     Days #8      * predniSONE* (Deltasone*) 10 MG TABLET: 10 MG PO ASDIR #45       Instructions: 14fnu6q,35bzm1u,63ddz0d,64afm7k,00tut2x      PLAN:      The patient is a 57 year old female with very severe chronic obstructive     pulmonary disease with left lower lobe pneumonia and recurrent chronic     obstructive pulmonary disease exacerbation. Currently she is doing better.              1. Chronic obstructive pulmonary disease with recurrent exacerbations.      Continue with Spiriva once daily. Given that her Perforomist and Pulmicort  is     not covered I will start her on Symbicort once daily.  Use albuterol as needed.                  2. Chronic hypoxic respiratory failure. Continue oxygen with sleep and     activities. Continue with Daliresp as it helps her.             3. Postnasal drip and chronic sinusitis. I will start her on Singulair once     daily and azelastine nasal  spray. Use Flonase once daily and saline nasal     spray.             4. Oral thrush. I have given her Diflucan to use for 5 days. I have given her 5     extra pills if she needs them in the future.             5. I will follow up with her in 3-4 months, earlier if needed.            Patient Education      Education resources provided:  Yes      Patient Education Provided:  COPD                 Disclaimer: Converted document may not contain table formatting or lab diagrams. Please see [employer] for the authenticated document.

## 2021-05-28 NOTE — PROGRESS NOTES
Patient: DENI HOLLEY     Acct: [id number]     Report: #[id number]  UNIT #: [id number]     : 1960    Encounter Date:2019  PRIMARY CARE: BJ DOMINGUEZ  ***Signed***  --------------------------------------------------------------------------------------------------------------------  Chief Complaint      Encounter Date      2019            Primary Care Provider      BJ DOMINGUEZ            Referring Provider      SELF,REFERRED            [facility] follow-up            Patient Complaint      Patient is complaining of      4 month follow up/soa            VITALS      Height 5 ft 5 in / 165.1 cm      Weight 196 lbs 4 oz / 89.411970 kg      BSA 1.96 m2      BMI 32.7 kg/m2      Temperature 98.2 F / 36.78 C - Oral      Pulse 95      Respirations 16      Blood Pressure 148/72 Sitting, Right Arm      Pulse Oximetry 97%, room air      Initial Exhaled Nitrous Oxide      Exhaled Nitrous Oxide Results:  6            HPI      The patient is a 58 year old female with very severe COPD with recurrent COPD     exacerbation, chronic hypoxic respiratory failure and overall thrush.  She is     here for follow up.  She has gone back to smoking cigarettes.  She was suppose     to be on bevespi and Arnuity, but she ran out of the inhalers and is not using     over the last several weeks. She has worsening shortness of breath.  She uses     albuterol for rescue.  She has worsening cough and phlegm and is not able to be     very active.  She has to take care of her  and is not able to help him as    much with her breathing problems.  She also had kidney stone back in 2019.      She was admitted to the hospital and had kidney stones.  She is taking Daliresp     and is also on Singulair.            ROS      Constitutional:  Complains of: Fatigue; Denies: Fever, Weight gain, Weight loss,    Chills, Insomnia, Other      Respiratory/Breathing:  Complains of: Shortness of air, Wheezing, Cough; Denies:     Hemoptysis, Pleuritic pain, Other      Endocrine:  Denies: Polydipsia, Polyuria, Heat/cold intolerance, Abnorml     menstrual pattern, Diabetes, Other      Eyes:  Denies: Blurred vision, Vision Changes, Other      Ears, nose, mouth, throat:  Denies: Mouth lesions, Thrush, Throat pain, Hoar    seness, Allergies/Hay Fever, Post Nasal Drip, Headaches, Recent Head Injury,     Nose Bleeding, Neck Stiffness, Thyroid Mass, Hearing Loss, Ear Fullness, Dry     Mouth, Nasal or Sinus Pain, Dry Lips, Nasal discharge, Nasal congestion, Other      Cardiovascular:  Denies: Palpitations, Syncope, Claudication, Chest Pain, Wake     up Gasping for air, Leg Swelling, Irregular Heart Rate, Cyanosis, Dyspnea on     Exertion, Other      Gastrointestinal:  Denies: Nausea, Constipation, Diarrhea, Abdominal pain,     Vomiting, Difficulty Swallowing, Reflux/Heartburn, Dysphagia, Jaundice,     Bloating, Melena, Bloody stools, Other      Genitourinary:  Denies: Urinary frequency, Incontinence, Hematuria, Urgency,     Nocturia, Dysuria, Testicular problems, Other      Musculoskeletal:  Denies: Joint Pain, Joint Stiffness, Joint Swelling, Myalgias,    Other      Hematologic/lymphatic:  DENIES: Lymphadenopathy, Bruising, Bleeding tendencies,     Other      Neurological:  Denies: Headache, Numbness, Weakness, Seizures, Other      Psychiatric:  Denies: Anxiety, Appropriate Effect, Depression, Other      Sleep:  No: Excessive daytime sleep, Morning Headache?, Snoring, Insomnia?, Stop    breathing at sleep?, Other      Integumentary:  Denies: Rash, Dry skin, Skin Warm to Touch, Other      Immunologic/Allergic:  Denies: Latex allergy, Seasonal allergies, Asthma,     Urticaria, Eczema, Other      Immunization status:  No: Up to date            FAMILY/SOCIAL/MEDICAL HX      Surgical History:  Yes: Abdominal Surgery (WOUND INFECTION DEHISCENCE 9/2011     DEBRIDEMENT W/WOUND VAC), Appendectomy (1980'S), Bladder Surgery (ANTERIOR AND     POSTERIOR REPAIR  1980'S), Bowel Surgery (COLONOSCOPY COLON RESECTION 8/24/2011),    Cholecystectomy (6/15/15), Head Surgery (TONSILLECTOMY, SINUS), Nose Surgery     (SINUS SURGERY), Oral Surgery (DENTAL EXTRACTION), Orthopedic Surgery (LEFT     SHOULDER X2), Throat Surgery (tonsilectomy), Other Surgeries; No: AAA Repair,     Angioplasty, Back Surgery, Breast Surgery, CABG, Carotid Stenosis, Ear Surgery,     Eye Surgery, Hernia Surgery, Kidney Surgery, Prostatectomy, Rectal Surgery,     Spinal Surgery, Testicular Surgery, Valve Replacement, Vascular Surgery      Heart - Family Hx:  Father, Sister      Diabetes - Family Hx:  Father, Sister      Cancer/Type - Family Hx:  Father      Is Father Still Living?:  No      Is Mother Still Living?:  No       Family History:  Yes      Social History:  Tobacco Use; No Alcohol Use, No Recreational Drug use      Smoking status:  Current some day smoker (one pack per day since the age of 14,     quit in June 2017, 43-pack-year history started back 12/01/18 1 cig every 2     weeks )      Smoking history:  25-50 pack years      Occupation:  unemployed, previously was a       Whom do you live with?:        Hysterectomy:  Yes      Anticoagulation Therapy:  No      Antibiotic Prophylaxis:  Yes      Medical History:  Yes: Arthritis (LEFT SHOULDER), Asthma, Chronic Bronchi    tis/COPD (HOME NEBS PRN), Depression, Anxiety, Hemorrhoids/Rectal Prob     (DIVERTICULITIS, IRRITABLE BOWEL SYNDROME), Skin Disease/Psoriais/Ecz,     Miscellaneous Medical/oth (ABDOMINAL WOUND INFECTION DEBRIDEMENT X3 , VENTRAL     HERNIA REPAIRX2); No: Alcoholism, Allergies, Anemia, Blood Disease, Broken     Bones, Cataracts, Chemical Dependency, Chemotherapy/Cancer, Emphysema, Chronic     Liver Disease, Colon Trouble, Colitis, Diverticulitis, Congestive Heart Failu,     Deafness or Ringing Ears, Convulsions, Bipolar Disorder, Diabetes, Epilepsy,     Seizures, Forgetfullness, Glaucoma, Gall Stones, Gout, Head  Injury, Heart     Attack, Heart Murmur, Hepatitis, Hiatal Hernia, High Blood Pressure, High     Cholesterol, HIV (Do not ask - volu, Jaundice, Kidney or Bladder Disease, Kidney    Stones, Migrane Headaches, Mitral Valve Prolapse, Night sweats, Phlebitis,     Psychiatric Care, Reflux Disease, Rheumatic Fever, Sexually Transmitted Dis,     Shortness Of Breath (AT AGE 13 SPONTANEOUS PNEUMOTHORAX-CHEST TUBE), Sinus     Trouble, Stroke, Thyroid Problem, Tuberculosis or Pos TB Te      Psychiatric History      depression and anxiety            PREVENTION      Hx Influenza Vaccination:  Yes      Date Influenza Vaccine Given:  Nov 1, 2018      Influenza Vaccine Declined:  No      2 or More Falls Past Year?:  No      Fall Past Year with Injury?:  No      Hx Pneumococcal Vaccination:  Yes      Encouraged to follow-up with:  PCP regarding preventative exams.      Chart initiated by      shamar mendez ma            ALLERGIES/MEDICATIONS      Allergies:        Coded Allergies:             LEVOFLOXACIN (Verified  Allergy, Severe, REDNESS, SWELLING, 4/24/19)           MORPHINE (Verified  Allergy, Severe, ARM SWELLING, HIVES, RED STREAKS,     4/24/19)           PENICILLINS (Verified  Allergy, Severe, ITCHING, HIVES AND SOA, 4/24/19)                  11-16: SPOKE WITH RN; PT STATES CAN TAKE CEPHALOSPORINS;                  CHECKED PREVIOUS ADMISSIONS               IN THE PAST W/O RX           QUINOLONES (Verified  Allergy, Severe, edema and red streaks on arm,     4/24/19)           AMOXICILLIN TRIHYDRATE (Verified  Allergy, Unknown, RASH,ITCHES, 4/24/19)           POTASSIUM CLAVULANATE (Verified  Allergy, Unknown, RASH,ITCHES, 4/24/19)           ZOLPIDEM TARTRATE (Verified  Allergy, Unknown, INCREASES RESTLESSNESS,     NAUSEA, 4/24/19)           ADHESIVE (Verified  Adverse Reaction, Severe, BLISTERS,RED, 4/24/19)           ASPIRIN (Verified  Adverse Reaction, Unknown, hallucinations, 4/24/19)           OXYCODONE (Verified  Adverse  Reaction, Unknown, hallucinations, 4/24/19)      Medications    Last Reconciled on 4/24/19 17:01 by LUCIANO KRISHNAMURTHY MD      Azelastine/Fluticasone (Dymista Nasal Spray) 23 Gm Spray.pump               Prov: Luciano Krishnamurthy         4/24/19       Beclomethasone Dipropionate (QNASL) 8.7 Gm Hfa.aer.ad      1 PUFFS NARE EACH QDAY, #1 BOTTLE 0 Refills         Prov: Luciano Krishnamurthy         4/24/19       Umeclidinium Bromide (Incruse Ellipta) 62.5 Mcg Blst.w.dev      1 PUFF INH RTQDAY, #1 MDI 9 Refills         Prov: Luciano Krishnamurthy         4/24/19       Budesonide/Formoterol Fumarate (Symbicort 160/4.5 Mcg) 10.2 Gm Inh      2 PUFF INH RTBID, #1 INH 9 Refills         Prov: Luciano Krishnamurthy         4/24/19       oxyCODONE/Acetaminophen 5/325 MG (oxyCODONE/Acetaminophen 5/325 MG) 1 Each     Tablet      1 TAB PO Q4H PRN for PAIN, #20 TAB 0 Refills         Prov: Lisa Mendoza         2/19/19       Hydrocodone/Acetaminophen 5/325 MG (Hydrocodone/Acetaminophen 5/325 MG) 1 Each     Tablet      2 TAB PO Q4H PRN for PAIN, #20 TAB 0 Refills         Prov: Lisa Mendoza         1/29/19       Azelastine/Fluticasone (Dymista Nasal Spray) 23 Gm Spray.pump      1 SPRAYS NARE EACH BID for 30 Days, #1 BOTTLE 3 Refills         Prov: Luciano Krishnamurthy         12/14/18       Neb-Budesonide (Budesonide) 0.5 Mg/2 Ml Ampul.neb      0.5 MG INH RTQDAY, #30 NEB         Reported         8/22/18       Aspirin Chew (Aspirin Baby) 81 Mg Tab.chew      81 MG PO QDAY, #30 TAB.CHEW 0 Refills         Reported         4/16/18       Roflumilast (Daliresp) 500 Mcg Tab      500 MCG PO QDAY for 90 Days, #90 TAB 1 Refill         Prov: Luciano Krishnamurthy         3/6/18       Azelastine Hcl (Azelastine Nasal) 137 Mcg/0.137 Ml Spray.pump      2 PUFFS NARE EACH BID, #1 BOTTLE 9 Refills         Prov: Luciano Krishnamurthy         2/27/18       Albuterol/Ipratropium (Duoneb) 3 Ml Ampul.neb      3 ML INH Q4H PRN for SHORTNESS OF BREATH, #120 NEB 2 Refills         Prov: Luciano Krishnamurthy         11/17/17        Cetirizine Hcl (ZyrTEC) 10 Mg Tablet      10 MG PO QDAY, #30 TAB 0 Refills         Reported         7/28/17       chlordiazePOXIDE HCl (Librium*) 25 Mg Capsule      25 MG PO TID, CAP         Reported         7/28/17       Potassium Chloride (Klor-Con) 8 Meq Tablet.er      8 MEQ PO QDAY PRN for EDEMA/SWELLING, TAB         Reported         7/28/17       Desvenlafaxine Succinate (Pristiq) 50 Mg Tab.er.24h      50 MG PO QDAY, TAB.SR         Reported         7/28/17       Albuterol (Proair HFA) 8.5 Gm Inh      2 PUFFS INH RTQ4H, #1 INH 0 Refills         Reported         7/28/17       Furosemide* (Lasix*) 40 Mg Tablet      40 MG PO QDAY PRN for EDEMA/SWELLING, #30 TAB 0 Refills         Reported         7/28/17       Dicyclomine HCl (BENTYL) 20 Mg Tablet      20 MG PO Q6H PRN for ABDOMINAL CRAMPING, #30 TAB         Reported         7/28/17       Diclofenac Sodium (Diclofenac Sodium) 75 Mg Tablet.dr      75 MG PO BID, #60 TAB 0 Refills         Reported         7/28/17       QUEtiapine (SEROquel) 100 Mg Tab      100 MG PO BID, TAB         Reported         6/12/15      Current Medications      Current Medications Reviewed 4/24/19            EXAM      CONSTITUTIONAL: Pleasant but anxious female in mild respiratory distress,     audible wheezing and rhonchi.         EYES : Pink conjunctive, no ptosis, PERRL.       ENMT : Nose and ears appear normal, normal dentition. Mallampati classification     III, mild sinus tenderness. The patient has posterior wall erythema.        Neck: Nontender, no masses, no thyromegaly, no nodules.      Resp : Bilateral diminished breath sounds, resonant to percussion bilaterally,     expiratory wheezing, no crackles,  scattered rhonchi.      CVS  : No carotid bruits, s1s2 nl, RRR, no murmur, rubs or gallop, no peripheral    edema       Chest wall: Normal rise with inspiration, nontender on palpation. Increased AP     diameter.       GI   : Abdomen soft, with no masses, no hepatosplenomegaly, no  hernias, BS+      MSK  : Normal gait and station, no digital cyanosis or clubbing       Skin : No rashes, ulcerations or lesions, normal turgor and temperature      Neuro: CN II - XII intact, no sensory deficits, DTRs intact and symmetrical, no     motor weakness      Psych: Appropriate affect, A   Vtials      Vitals:             Height 5 ft 5 in / 165.1 cm           Weight 196 lbs 4 oz / 89.698602 kg           BSA 1.96 m2           BMI 32.7 kg/m2           Temperature 98.2 F / 36.78 C - Oral           Pulse 95           Respirations 16           Blood Pressure 148/72 Sitting, Right Arm           Pulse Oximetry 97%, room air            REVIEW      Results Reviewed      PCCS Results Reviewed?:  Yes Prev Lab Results, Yes Prev Radiology Results, Yes     Previous Mecial Records      Radiographic Results               [facility]                PACS RADIOLOGY REPORT            Patient: DENI HOLLEY   Acct: #[id number]   Report: #[id number]            UNIT #: [id number]    DOS: 19 1328      INSURANCE:[employer] K[employer]   ORDER #:[id number]      LOCATION:CT     : 1960            PROVIDERS      ADMITTING:     ATTENDING: Luciano Krishnamurthy      FAMILY:  BJ DOMINGUEZ   ORDERING:  Luciano Krishnamurthy         OTHER:    DICTATING:  Duane Pack MD            REQ #:[id number]   EXAM:[facility] - LOW DOSE CHEST CT SCREENING      REASON FOR EXAM:  FORMER SMOKER      REASON FOR VISIT:  FORMER SMOKER            *******Signed******         PROCEDURE:   CT LOW DOSE CHEST SCREENING             COMPARISON:   [facility], CT, CHEST W/O CONTRAST, 2018, 19    :53.  [facility], CT, ABD PEL W/O CONTRAST, 2019, 2:58.             REASON FOR SCREENING:   Patient is 58 years of age, asymptomatic, and has a     smoking history of more       than 30 pack years.      SMOKING STATUS:   Former smoker.  Years since quittin                 SCREENING VISIT:   <>                  TECHNIQUE:   Axial unenhanced LDCT images from the apices through mid-kidney     were obtained.       Evaluation of solid organs and vascular structures is suboptimal due to the lack    of IV contrast.       Imaging was performed on a Siemens Sensation 64 CT scanner.             RADIATION:   CT Dose Index Vol (CTDIvol):    3.01  mGy         Dose Length Product (DLP):    103  mGy-cm             DIAGNOSTIC QUALITY:   Satisfactory.               FINDINGS:         Today's study is compared previous CT of the chest performed on 4/16/2018.      Today's study reveals       no evidence of mass or adenopathy in the base the neck or in the periclavicular     soft tissues or in       the axillary soft tissues.  No evidence of mass or adenopathy in the mediastinum    or in the right or       left pulmonary malathi.  Benign calcified granulomas are seen in the right     pulmonary malathi.  A 5 mm in       size benign calcified granulomas seen in the posterior lateral superior aspect     the right lower lobe       lung and is unchanged.  No evidence of pneumonia or pleural effusion or     pneumothorax or mass or       metastatic disease in the right or left lungs.  Mild bronchial wall thickening     and mild       bronchiectasis is seen in the pulmonary malathi bilaterally consistent with     bronchitis.  No acute       disease in the superior aspect of the abdomen.  There is mild pericardial effu    bibiana or mild       pericardial thickening surrounding the heart unchanged significantly since     previous study.             CONCLUSION:         1. No evidence of lung cancer.LUNG-RADS CLASSIFICATION:  Lung-RADS 1 - Negative.     No nodules or       definitely benign nodules.  Recommendation: Continue annual screening with LDCT     in 12 months.  <1%       probability of malignancy.  Estimated population prevalence is 90%.  Reference:     ACR Lung-RADS (Lung       CT Screening reporting and data  system) Version 1.0 assessment categories     release date April 28, 2014.      2. Either small pericardial effusion or mild pericardial thickening surrounding     the heart unchanged       since previous study performed on 4/16/2018.      3. Mild bronchiectasis and mild bronchitis in the right left pulmonary malathi     unchanged significantly       since previous study              KWAN XAVIER MD             Electronically Signed and Approved By: KWAN XAVIER MD on 4/24/2019 at     14:50                        Until signed, this is an unconfirmed preliminary report that may contain      errors and is subject to change.                                              JESSICA:      D:04/24/19 1450      PFT Results      [id number]  [id number]                                 [facility] Information Management Services                            [facility]  [id number]               __________________________________________________________________________             Patient Name:                   Attending Physician:      Callie Basilio             Patient Visit # MR #            Admit Date  Disch Date     Location      [id number]    [id number]      11/01/2017                 CVS- -             Date of Birth      1960      __________________________________________________________________________      0821 - DIAGNOSTIC REPORT             PULMONARY FUNCTION TEST             SPIROMETRY:      Spirometry shows very severe obstructive defect.      FEV1/FVC ratio is 45%.      FEV1 is 0.77 L, 29% of predicted.      FVC is 1.73 L, 51% of predicted.             BRONCHODILATOR RESPONSE:      There is a significant response to bronchodilator administration. FEV1      increased from 0.77 L to 0.89 L, 18% increase. FVC increased from 1.73 L to      2.04 L, 18% increase.             LUNG VOLUMES:      Lung  volumes show air trapping.      Total lung capacity is 5.84 L, 111% of predicted.      Residual volume is 3.75 L, 192% of predicted.             FLOW VOLUME LOOP:      Flow volume loop is consistent with severe obstructive process.             DIFFUSION:      The patient could not do diffusion capacity even on several attempts.             CONCLUSION:      Low FEV1/FVC with low FEV1 and FVC with air trapping. It suggests very severe      obstructive airway disease, likely emphysema. There is some reversible      component to her obstructive airway disease. Please correlate clinically.             To be electronically signed in [employer]      [id number] LATOYA KRISHNAMURTHY M.D.             NK:korin      D:  11/03/2017 13:35      T:  11/03/2017 15:00      #[id number]             Until signed, this is an unconfirmed preliminary report that may contain      errors and is subject to change.                   11/08/17 1308  <Electronically signed by Latoya Krishnamurthy MD>            Assessment      Notes      New Medications      * AZELASTINE/FLUTICASONE (Dymista Nasal Spray) 23 GM SPRAY.PUMP          Sample - Qty 2         Instructions: 1 Spray each nostril BID         Dx: COPD (chronic obstructive pulmonary disease) - J44.9      * Budesonide/Formoterol Fumarate (Symbicort 160/4.5 Mcg) 10.2 GM INH: 2 PUFF INH      RTBID #1      * UMECLIDINIUM BROMIDE (Incruse Ellipta) 62.5 MCG BLST.W.DEV: 1 PUFF INH RTQDAY       #1      * BECLOMETHASONE DIPROPIONATE (QNASL) 8.7 GM HFA.AER.AD: 1 PUFFS NARE EACH QDAY       #1      Discontinued Medications      * Glycopyrrolate/Formoterol Fum (Bevespi Aerosphere Inhaler) 10.7 GM HFA.AER.AD:      2 PUFFS INH BID #1      PLAN:      The patient is a 58 year old female with a history of very severe chronic     obstructive pulmonary disease with recurrent chronic obstructive pulmonary     disease exacerbation and chronic hypoxic respiratory failure.             1. Very severe chronic obstructive pulmonary disease.  She  likes Symbicort more     than any other inhaler, so I will put her back on Symbicort and add Incruse.      Continue on Daliresp.            2.  She is suppose to have LDCTscan today, check on that.              3.  Continue oxygen with activities and sleep.            4.  Continue Singulair and Daliresp.            5.  I will send a script for Qnasl.  I will give her a Dymista sample.            6. Follow up in 3-4 months, earlier if needed.            Patient Education      Education resources provided:  Yes      Patient Education Provided:  Acute Bronchitis                 Disclaimer: Converted document may not contain table formatting or lab diagrams. Please see [employer] for the authenticated document.

## 2021-05-28 NOTE — PROGRESS NOTES
Patient: DENI HOLLEY     Acct: [id number]     Report: #[id number]  UNIT #: [id number]     : 1960    Encounter Date:2018  PRIMARY CARE: BJ DOMINGUEZ  ***Signed***  --------------------------------------------------------------------------------------------------------------------  Chief Complaint      Encounter Date      Dec 13, 2018            Primary Care Provider      BJ DOMINGUEZ            Referring Provider      SELF,REFERRED            [facility] follow-up            Patient Complaint      Patient is complaining of      3 to 4 month f/u copd            VITALS      Height 5 ft 5 in / 165.1 cm      Weight 201 lbs 3 oz / 91.336052 kg      BSA 1.98 m2      BMI 33.5 kg/m2      Temperature 98.4 F / 36.89 C - Oral      Pulse 86      Respirations 16      Blood Pressure 130/86 Sitting, Right Arm      Pulse Oximetry 94%, room air      Initial Exhaled Nitrous Oxide      Exhaled Nitrous Oxide Results:  6            HPI      The patient is a 58 year old female with very severe chronic obstructive     pulmonary disease with recurrent chronic obstructive pulmonary disease     exacerbation and chronic hypoxic respiratory failure. She is here for follow up.          Since her last office visit she was started on Bevespi and Arnuity. She was     taking those but ran out of Arnuity and has not taken it for a few weeks now.     She has been having a sore throat and significant problems swallowing with voice    change and hoarseness of her voice. She does use her rescue inhaler several     times a day. She is also using albuterol  nebulizer several times a day. She has    no fevers or chills, no nausea and vomiting. She has baseline shortness of     breath and cough with sputum production. She has no significant changes in her     weight or appetite.            ROS      Constitutional:  Complains of: Fatigue; Denies: Fever, Weight gain, Weight loss,    Chills, Insomnia, Other      Respiratory/Breathing:   Complains of: Shortness of air, Wheezing, Cough; Denies:    Hemoptysis, Pleuritic pain, Other      Endocrine:  Denies: Polydipsia, Polyuria, Heat/cold intolerance, Abnorml     menstrual pattern, Diabetes, Other      Eyes:  Denies: Blurred vision, Vision Changes, Other      Ears, nose, mouth, throat:  Denies: Congestion, Dysphagia, Hearing Changes, Nose    Bleeding, Nasal Discharge, Throat pain, Tinnitus, Other      Cardiovascular:  Denies: Chest Pain, Exertional dyspnea, Peripheral Edema,     Palpitations, Syncope, Wake up Gasping for air, Orthopnea, Tachycardia, Other      Gastrointestinal:  Denies: Abdominal pain/cramping, Bloody stools, Constipation,    Diarrhea, Melena, Nausea, Vomiting, Other      Genitourinary:  Denies: Dysuria, Urinary frequency, Incontinence, Hematuria,     Urgency, Other      Musculoskeletal:  Denies: Joint Pain, Joint Stiffness, Joint Swelling, Myalgias,    Other      Hematologic/lymphatic:  DENIES: Lymphadenopathy, Bruising, Bleeding tendencies,     Other      Neurologic:  Denies: Headache, Numbness, Weakness, Seizures, Other      Psychiatric:  Denies: Anxiety, Appropriate Effect, Depression, Other      Sleep:  No: Excessive daytime sleep, Morning Headache?, Snoring, Insomnia?, Stop    breathing at sleep?, Other      Integumentary:  Denies: Rash, Dry skin, Skin Warm to Touch, Other            FAMILY/SOCIAL/MEDICAL HX      Surgical History:  Yes: Abdominal Surgery (WOUND INFECTION DEHISCENCE 9/2011     DEBRIDEMENT W/WOUND VAC), Appendectomy (1980'S), Bladder Surgery (ANTERIOR AND     POSTERIOR REPAIR 1980'S), Bowel Surgery (COLONOSCOPY COLON RESECTION 8/24/2011),    Cholecystectomy (6/15/15), Head Surgery (TONSILLECTOMY, SINUS), Nose Surgery     (SINUS SURGERY), Oral Surgery (DENTAL EXTRACTION), Orthopedic Surgery (LEFT     SHOULDER X2), Throat Surgery (tonsilectomy), Other Surgeries; No: AAA Repair,     Angioplasty, Back Surgery, Breast Surgery, CABG, Carotid Stenosis, Ear Surgery,      Eye Surgery, Hernia Surgery, Kidney Surgery, Prostatectomy, Rectal Surgery,     Spinal Surgery, Testicular Surgery, Valve Replacement, Vascular Surgery      Heart - Family Hx:  Father, Sister      Diabetes - Family Hx:  Father, Sister      Cancer/Type - Family Hx:  Father      Is Father Still Living?:  No      Is Mother Still Living?:  No       Family History:  Yes      Social History:  Tobacco Use; No Alcohol Use, No Recreational Drug use      Smoking status:  Former smoker (one pack per day since the age of 14, quit in     June 2017, 43-pack-year history)      Smoking history:  25-50 pack years      Occupation:  unemployed, previously was a       Whom do you live with?:        Hysterectomy:  Yes      Anticoagulation Therapy:  No      Antibiotic Prophylaxis:  Yes      Medical History:  Yes: Arthritis (LEFT SHOULDER), Asthma, Chronic     Bronchitis/COPD (HOME NEBS PRN), Depression, Anxiety, Hemorrhoids/Rectal Prob     (DIVERTICULITIS, IRRITABLE BOWEL SYNDROME), Skin Disease/Psoriais/Ecz,     Miscellaneous Medical/oth (ABDOMINAL WOUND INFECTION DEBRIDEMENT X3 , VENTRAL     HERNIA REPAIRX2); No: Alcoholism, Allergies, Anemia, Blood Disease, Broken     Bones, Cataracts, Chemical Dependency, Chemotherapy/Cancer, Emphysema, Chronic     Liver Disease, Colon Trouble, Colitis, Diverticulitis, Congestive Heart Failu,     Deafness or Ringing Ears, Convulsions, Bipolar Disorder, Diabetes, Epilepsy,     Seizures, Forgetfullness, Glaucoma, Gall Stones, Gout, Head Injury, Heart     Attack, Heart Murmur, Hepatitis, Hiatal Hernia, High Blood Pressure, High     Cholesterol, HIV (Do not ask - volu, Jaundice, Kidney or Bladder Disease, Kidney    Stones, Migrane Headaches, Mitral Valve Prolapse, Night sweats, Phlebitis,     Psychiatric Care, Reflux Disease, Rheumatic Fever, Sexually Transmitted Dis,     Shortness Of Breath (AT AGE 13 SPONTANEOUS PNEUMOTHORAX-CHEST TUBE), Sinus     Trouble, Stroke, Thyroid Problem,  Tuberculosis or Pos TB Te      Psychiatric History      depression and anxiety            PREVENTION      Hx Influenza Vaccination:  Yes      Date Influenza Vaccine Given:  Nov 1, 2018      Influenza Vaccine Declined:  No      2 or More Falls Past Year?:  No      Fall Past Year with Injury?:  No      Hx Pneumococcal Vaccination:  Yes      Encouraged to follow-up with:  PCP regarding preventative exams.      Chart initiated by      Faby Hansen ma            ALLERGIES/MEDICATIONS      Allergies:        Coded Allergies:             LEVOFLOXACIN (Verified  Allergy, Severe, REDNESS, SWELLING, 12/13/18)           MORPHINE (Verified  Allergy, Severe, ARM SWELLING, HIVES, RED STREAKS,     12/13/18)           PENICILLINS (Verified  Allergy, Severe, ITCHING, HIVES AND SOA, 12/13/18)                  11-16: SPOKE WITH RN; PT STATES CAN TAKE CEPHALOSPORINS;                  CHECKED PREVIOUS ADMISSIONS               IN THE PAST W/O RX           QUINOLONES (Verified  Allergy, Severe, edema and red streaks on arm,     12/13/18)           AMOXICILLIN TRIHYDRATE (Verified  Allergy, Unknown, RASH,ITCHES, 12/13/18)           POTASSIUM CLAVULANATE (Verified  Allergy, Unknown, RASH,ITCHES, 12/13/18)           ZOLPIDEM TARTRATE (Verified  Allergy, Unknown, UNK, 12/13/18)           ADHESIVE (Verified  Adverse Reaction, Severe, BLISTERS,RED, 12/13/18)           OXYCODONE HCL (Verified  Adverse Reaction, Unknown, CONFUSION AND     HALLUCINATIONS, 12/13/18)           OXYCODONE TEREPHTHALATE (Verified  Adverse Reaction, Unknown, CONFUSION AND    HALLUCINATIONS, 12/13/18)           ZOLPIDEM (Verified  Adverse Reaction, Unknown, INCREASED RESTLESSNESS,     12/13/18)      Medications    Last Reconciled on 8/22/18 10:16 by LUCIANO KRISHNAMURTHY MD      Beclomethasone Dipropionate (Qvar 80 Redihaler 10.6 GM) 10.6 Gm Hfa.aeroba      1 PUFF INH RTBID, #1 INH 9 Refills         Prov: Luciano Krishnamurthy         8/22/18       Glycopyrrolate/Formoterol Fum (Bevespi  Aerosphere Inhaler) 10.7 Gm Hfa.aer.ad      2 PUFFS INH BID, #1 HFA.AER.AD 9 Refills         Prov: Luciano Krishnamurthy         8/22/18       Neb-Budesonide (Budesonide) 0.5 Mg/2 Ml Ampul.neb      0.5 MG INH RTQDAY, #30 NEB         Reported         8/22/18       Aspirin Chew (Aspirin Baby) 81 Mg Tab.chew      81 MG PO QDAY, #30 TAB.CHEW 0 Refills         Reported         4/16/18       Roflumilast (Daliresp) 500 Mcg Tab      500 MCG PO QDAY for 90 Days, #90 TAB 1 Refill         Prov: RaghuLuciano         3/6/18       Azelastine Hcl (Azelastine Nasal) 137 Mcg/0.137 Ml Spray.pump      2 PUFFS NARE EACH BID, #1 BOTTLE 9 Refills         Prov: RaghuLuciano         2/27/18       Montelukast Sodium (Singulair*) 10 Mg Tab      10 MG PO HS, #30 TAB 9 Refills         Prov: AntonioLuciano colón         2/27/18       Albuterol/Ipratropium (Duoneb) 3 Ml Ampul.neb      3 ML INH Q4H PRN for SHORTNESS OF BREATH, #120 NEB 2 Refills         Prov: RaghuLuciano         11/17/17       Promethazine Hcl (Phenergan*) 25 Mg Tablet      25 MG PO Q4-6H PRN for NAUSEA, TAB 0 Refills         Reported         11/16/17       Cetirizine Hcl (ZyrTEC) 10 Mg Tablet      10 MG PO QDAY, #30 TAB 0 Refills         Reported         7/28/17       chlordiazePOXIDE HCl (Librium*) 25 Mg Capsule      25 MG PO TID, CAP         Reported         7/28/17       Potassium Chloride (Klor-Con) 8 Meq Tablet.er      8 MEQ PO QDAY, TAB         Reported         7/28/17       Desvenlafaxine Succinate (Pristiq) 50 Mg Tab.er.24h      50 MG PO QDAY, TAB.SR         Reported         7/28/17       Albuterol (Proair HFA) 8.5 Gm Inh      2 PUFFS INH RTQ4H, #1 INH 0 Refills         Reported         7/28/17       Furosemide* (Lasix*) 40 Mg Tablet      40 MG PO QDAY PRN for EDEMA/SWELLING, #30 TAB 0 Refills         Reported         7/28/17       Dicyclomine HCl (BENTYL) 20 Mg Tablet      20 MG PO Q6H PRN for ABDOMINAL CRAMPING, #30 TAB         Reported         7/28/17       Diclofenac Sodium (Diclofenac  Sodium) 75 Mg Tablet.dr      75 MG PO BID, #60 TAB 0 Refills         Reported         7/28/17       QUEtiapine (SEROquel) 100 Mg Tab      100 MG PO BID, TAB         Reported         6/12/15      Current Medications      Current Medications Reviewed 12/13/18            EXAM      CONSTITUTIONAL: Pleasant but anxious female in mild respiratory distress,     audible wheezing and rhonchi.         EYES : Pink conjunctive, no ptosis, PERRL.       ENMT : Nose and ears appear normal, normal dentition. Mallampati classification     III, no sinus tenderness. The patient has hoarseness of her voice and oral     thrush with posterior pharyngeal wall coated with whitish patches.       Neck: Nontender, no masses, no thyromegaly, no nodules.      Resp : Bilateral diminished breath sounds, resonant to percussion bilaterally,     expiratory wheezing, no crackles,  scattered rhonchi.      CVS  : No carotid bruits, s1s2 nl, RRR, no murmur, rubs or gallop, no peripheral    edema       Chest wall: Normal rise with inspiration, nontender on palpation. Increased AP     diameter.       GI   : Abdomen soft, with no masses, no hepatosplenomegaly, no hernias, BS+      MSK  : Normal gait and station, no digital cyanosis or clubbing       Skin : No rashes, ulcerations or lesions, normal turgor and temperature      Neuro: CN II - XII intact, no sensory deficits, DTRs intact and symmetrical, no     motor weakness      Psych: Appropriate affect, A   Vitals      Vitals:             Height 5 ft 5 in / 165.1 cm           Weight 201 lbs 3 oz / 91.339074 kg           BSA 1.98 m2           BMI 33.5 kg/m2           Temperature 98.4 F / 36.89 C - Oral           Pulse 86           Respirations 16           Blood Pressure 130/86 Sitting, Right Arm           Pulse Oximetry 94%, room air            REVIEW      Results Reviewed      PCCS Results Reviewed?:  Yes Prev Lab Results, Yes Prev Radiology Results, Yes     Previous Mecial Records      Radiographic  Results               [facility]                PACS RADIOLOGY REPORT            Patient: DENI HOLLEY   Acct: #[id number]   Report: #[id number]            UNIT #: [id number]    DOS: 18 1413      INSURANCE:[employer] K[employer]   ORDER #:CT [id number]      LOCATION:[facility]  3010-01   : 1960            PROVIDERS      ADMITTING:  Henok Johnson   ATTENDING: Henok Johnson      FAMILY:  BJ DOMINGUEZ   ORDERING:  Henok Johnson         OTHER:    DICTATING:  Shayne Faust MD            REQ #:[id number]   EXAM:[facility] - CT CHEST without CONTRAST      REASON FOR EXAM:  copd       REASON FOR VISIT:  COPD EXACERBATION            *******Signed******         PROCEDURE:   CT CHEST WITHOUT CONTRAST             COMPARISON:   [facility], CT, CHEST W/ CONTRAST, 3/14/2017, 2:01.     [facility], CT, CHEST W/O CONTRAST, 2017, 13:50.             INDICATIONS:   COPD EXACERBATION             TECHNIQUE:   CT images were created without the administration of contrast     material.               PROTOCOL:     Standard imaging protocol performed                RADIATION:     DLP: 673mGy*cm          Automated exposure control was utilized to minimize radiation dose.              FINDINGS:         No adenopathy or effusion is evident.             Mild emphysematous changes are noted.  There is a 0.3 cm nodule in the right     lower lobe on image       45, stable since 3/14/2017.  A small focus of irregular density in the inferior     lingula is       suspected to represent atelectasis or a small infiltrate.  The lungs are     otherwise clear.             A cholecystectomy has been performed.  No focal osseous abnormality is     identified.             CONCLUSION:         1. Mild emphysematous changes      2. Stable 0.3 cm nodule in the right lower lobe.  Consider followup chest CT in     1 year to confirm       long-term stability period       3. Previous cholecystectomy              Shayne Faust M.D.             Electronically Signed and Approved By: Shayne Faust M.D. on 4/16/2018 at 20:35                           Until signed, this is an unconfirmed preliminary report that may contain      errors and is subject to change.                                              WURRO:      D:04/16/18 2035      PFT Results      [id number]  [id number]                                 [facility] [employer] [facility]                            [place], [place]  [address]               __________________________________________________________________________             Patient Name:                   Attending Physician:      Callie BasilioHAEL BOUCHER             Patient Visit # MR #            Admit Date  Disch Date     Location      [id number]    [id number]      11/01/2017                 [facility]- -             Date of Birth      1960      __________________________________________________________________________      0821 - DIAGNOSTIC REPORT             PULMONARY FUNCTION TEST             SPIROMETRY:      Spirometry shows very severe obstructive defect.      FEV1/FVC ratio is 45%.      FEV1 is 0.77 L, 29% of predicted.      FVC is 1.73 L, 51% of predicted.             BRONCHODILATOR RESPONSE:      There is a significant response to bronchodilator administration. FEV1      increased from 0.77 L to 0.89 L, 18% increase. FVC increased from 1.73 L to      2.04 L, 18% increase.             LUNG VOLUMES:      Lung volumes show air trapping.      Total lung capacity is 5.84 L, 111% of predicted.      Residual volume is 3.75 L, 192% of predicted.             FLOW VOLUME LOOP:      Flow volume loop is consistent with severe obstructive process.             DIFFUSION:      The patient could not do diffusion capacity even on several attempts.             CONCLUSION:       Low FEV1/FVC with low FEV1 and FVC with air trapping. It suggests very severe      obstructive airway disease, likely emphysema. There is some reversible      component to her obstructive airway disease. Please correlate clinically.             To be electronically signed in [employer]      [id number] LATOYA KRISHNAMURTHY M.D.             NK:[id number]      D:  11/03/2017 13:35      T:  11/03/2017 15:00      #[id number]             Until signed, this is an unconfirmed preliminary report that may contain      errors and is subject to change.                   11/08/17 1308  <Electronically signed by Latoya Krishnamurthy MD>            Assessment      Smoking greater than 40 pack years - F17.210            Notes      New Medications      * Fluconazole (Diflucan) 150 MG TABLET: 150 MG PO QDAY #10      * Nystatin (Nystatin*) 100,000 UNIT/1 ML ORAL.SUSP: 10 ML SWISH.SWAL Q6HR #60      * P-Ephed HCl/Fexofenadine HCl 120/60 MG (Allegra-D 12 Hour) 1 EACH TAB.ER.12H:       1 TAB PO BID #60      * AZELASTINE/FLUTICASONE (Dymista Nasal [place]) 23 GM SPRAY.PUMP: 1 SPRAYS NARE       EACH BID 30 Days #1      Discontinued Medications      * BECLOMETHASONE DIPROPIONATE (QNASL) 8.7 GM HFA.AER.AD: 1 PUFFS NARE EACH QDAY       #1      * ROFLUMILAST (Daliresp) 500 MCG TAB: 500 MCG PO QDAY 30 Days #30      * Fluticasone Furoate (Arnuity Ellipta) 200 MCG BLST.W.DEV: 1 PUFF INH RTQDAY #1         Instructions: to replace qvar      New Diagnostics      * Low Dose Chest CT, 4 Months         Dx: Smoking greater than 40 pack years - F17.210      PLAN:      The patient is a 58 year old female with very severe chronic obstructive     pulmonary disease with recurrent chronic obstructive pulmonary disease     exacerbation and chronic hypoxic respiratory failure. She has oral thrush now.            1. Chronic obstructive pulmonary disease with recurrent exacerbations. Continue     with Bevespi twice daily and Arnuity once daily. Use albuterol as needed.     Samples were provided  today.       2. Oral thrush. I will send her a script for Diflucan. She tried nystatin swish     and swallow but it has not helped. I will also add Allegra D for her hoarseness     of voice.       3. Continue Daliresp.       4. Pulmonary fibrosis and chronic sinusitis. Continue Singulair and Daliresp.       5. She has not gone to pulmonary rehabilitation and we will talk about it on the    next office visit.       6. Chronic hypoxic respiratory failure. Continue oxygen with sleep and     activities. She has not received her portable oxygen concentrator yet.  I will     order low dose lung cancer screening CT scan in 4 months.        7. I will follow up with her in 4 months, earlier if needed.                 Disclaimer: Converted document may not contain table formatting or lab diagrams. Please see [email or web address] for the authenticated document.

## 2021-05-28 NOTE — PROGRESS NOTES
Patient: DENI HOLLEY     Acct: [id number]     Report: #[id number]  UNIT #: [id number]     : 1960    Encounter Date:2019  PRIMARY CARE: BJ DOMINGUEZ  ***Signed***  --------------------------------------------------------------------------------------------------------------------  Chief Complaint      Encounter Date      2019            Primary Care Provider      BJ DOMINGUEZ            Referring Provider      SELF,REFERRED            [facility] follow-up            Patient Complaint      Patient is complaining of      Patient here today for 2 month follow up            VITALS      Height 65 in / 165.1 cm      Weight 194 lbs 9 oz / 88.895466 kg      BSA 1.96 m2      BMI 32.4 kg/m2      Temperature 98.4 F / 36.89 C - Oral      Pulse 83      Respirations 12      Blood Pressure 152/59 Sitting, Left Arm      Pulse Oximetry 96%, room air      Initial Exhaled Nitrous Oxide      Exhaled Nitrous Oxide Results:  6            HPI      The patient is a very pleasant 59 year white female patient of Dr. Krishnamurthy's with     very severe chronic obstructive pulmonary disease with recurrent chronic     obstructive pulmonary disease exacerbations, tobacco abuse of cigarettes in     remission longstanding and chronic hypoxic respiratory failure. She takes     Symbicort 160 and had incruse added by Dr. Krishnamurthy about 2 months ago at her last     office visit and it helped her significantly. She also had low dose lung cancer     screening CT scan of the chest done since her last visit which  I reviewed with     her today. It did not show any suspicious nodules. With the pollen and frequent     rain, she has been having increased increased dyspnea, coughing or wheezing for     the past several weeks. She is sometimes coughing up green or yellow purulent     sputum. She denies hemoptysis, fever or chills. She is asking for samples of     several medications including her ProAir rescue inhaler as the copay is about      $50 at her pharmacy. She is still taking Singulair and Daliresp. She had Qnasl     approved by her insurance and is now using that rather than dymista.             I reviewed her Review of Systems, medical, surgical and family history and agree    with those as entered.      Copies To:   Luciano Krishnamurthy      Constitutional:  Denies: Fatigue, Fever, Weight gain, Weight loss, Chills,     Insomnia, Other      Respiratory/Breathing:  Complains of: Shortness of air, Wheezing, Cough; Denies:    Hemoptysis, Pleuritic pain, Other      Endocrine:  Denies: Polydipsia, Polyuria, Heat/cold intolerance, Abnorml     menstrual pattern, Diabetes, Other      Eyes:  Denies: Blurred vision, Vision Changes, Other      Ears, nose, mouth, throat:  Denies: Mouth lesions, Thrush, Throat pain,     Hoarseness, Allergies/Hay Fever, Post Nasal Drip, Headaches, Recent Head Injury,    Nose Bleeding, Neck Stiffness, Thyroid Mass, Hearing Loss, Ear Fullness, Dry     Mouth, Nasal or Sinus Pain, Dry Lips, Nasal discharge, Nasal congestion, Other      Cardiovascular:  Denies: Palpitations, Syncope, Claudication, Chest Pain, Wake     up Gasping for air, Leg Swelling, Irregular Heart Rate, Cyanosis, Dyspnea on     Exertion, Other      Gastrointestinal:  Denies: Nausea, Constipation, Diarrhea, Abdominal pain, Vom    iting, Difficulty Swallowing, Reflux/Heartburn, Dysphagia, Jaundice, Bloating,     Melena, Bloody stools, Other      Genitourinary:  Denies: Urinary frequency, Incontinence, Hematuria, Urgency,     Nocturia, Dysuria, Testicular problems, Other      Musculoskeletal:  Denies: Joint Pain, Joint Stiffness, Joint Swelling, Myalgias,    Other      Hematologic/lymphatic:  DENIES: Lymphadenopathy, Bruising, Bleeding tendencies,     Other      Neurological:  Denies: Headache, Numbness, Weakness, Seizures, Other      Psychiatric:  Denies: Anxiety, Appropriate Effect, Depression, Other      Sleep:  No: Excessive daytime sleep, Morning  Headache?, Snoring, Insomnia?, Stop    breathing at sleep?, Other      Integumentary:  Denies: Rash, Dry skin, Skin Warm to Touch, Other      Immunologic/Allergic:  Denies: Latex allergy, Seasonal allergies, Asthma,     Urticaria, Eczema, Other      Immunization status:  No: Up to date            FAMILY/SOCIAL/MEDICAL HX      Surgical History:  Yes: Abdominal Surgery (WOUND INFECTION DEHISCENCE 9/2011     DEBRIDEMENT W/WOUND VAC), Appendectomy (1980'S), Bladder Surgery (ANTERIOR AND     POSTERIOR REPAIR 1980'S), Bowel Surgery (COLONOSCOPY COLON RESECTION 8/24/2011),    Cholecystectomy (6/15/15), Head Surgery (TONSILLECTOMY, SINUS), Nose Surgery     (SINUS SURGERY), Oral Surgery (DENTAL EXTRACTION), Orthopedic Surgery (LEFT     SHOULDER X2), Throat Surgery (tonsilectomy), Other Surgeries; No: AAA Repair,     Angioplasty, Back Surgery, Breast Surgery, CABG, Carotid Stenosis, Ear Surgery,     Eye Surgery, Hernia Surgery, Kidney Surgery, Prostatectomy, Rectal Surgery,     Spinal Surgery, Testicular Surgery, Valve Replacement, Vascular Surgery      Heart - Family Hx:  Father, Sister      Diabetes - Family Hx:  Father, Sister      Cancer/Type - Family Hx:  Father      Is Father Still Living?:  No      Is Mother Still Living?:  No       Family History:  Yes      Social History:  Tobacco Use; No Alcohol Use, No Recreational Drug use      Smoking status:  Current some day smoker (one pack per day since the age of 14,     quit in June 2017, 43-pack-year history started back 12/01/18 1 cig every 2     weeks )      Smoking history:  25-50 pack years      Occupation:  unemployed, previously was a       Whom do you live with?:        Hysterectomy:  Yes      Anticoagulation Therapy:  No      Antibiotic Prophylaxis:  Yes      Medical History:  Yes: Arthritis (LEFT SHOULDER), Asthma, Chronic     Bronchitis/COPD (HOME NEBS PRN), Depression, Anxiety, Hemorrhoids/Rectal Prob     (DIVERTICULITIS, IRRITABLE BOWEL  SYNDROME), Skin Disease/Psoriais/Ecz,     Miscellaneous Medical/oth (ABDOMINAL WOUND INFECTION DEBRIDEMENT X3 , VENTRAL     HERNIA REPAIRX2); No: Alcoholism, Allergies, Anemia, Blood Disease, Broken     Bones, Cataracts, Chemical Dependency, Chemotherapy/Cancer, Emphysema, Chronic     Liver Disease, Colon Trouble, Colitis, Diverticulitis, Congestive Heart Failu,     Deafness or Ringing Ears, Convulsions, Bipolar Disorder, Diabetes, Epilepsy,     Seizures, Forgetfullness, Glaucoma, Gall Stones, Gout, Head Injury, Heart     Attack, Heart Murmur, Hepatitis, Hiatal Hernia, High Blood Pressure, High     Cholesterol, HIV (Do not ask - volu, Jaundice, Kidney or Bladder Disease, Kidney    Stones, Migrane Headaches, Mitral Valve Prolapse, Night sweats, Phlebitis,     Psychiatric Care, Reflux Disease, Rheumatic Fever, Sexually Transmitted Dis,     Shortness Of Breath (AT AGE 13 SPONTANEOUS PNEUMOTHORAX-CHEST TUBE), Sinus     Trouble, Stroke, Thyroid Problem, Tuberculosis or Pos TB Te      Psychiatric History      depression. anxiety            PREVENTION      Hx Influenza Vaccination:  Yes      Date Influenza Vaccine Given:  Nov 1, 2018      Influenza Vaccine Declined:  No      2 or More Falls Past Year?:  No      Fall Past Year with Injury?:  No      Hx Pneumococcal Vaccination:  Yes      Encouraged to follow-up with:  PCP regarding preventative exams.      Chart initiated by      Miesha Sylvester CMA            ALLERGIES/MEDICATIONS      Allergies:        Coded Allergies:             LEVOFLOXACIN (Verified  Allergy, Severe, REDNESS, SWELLING, 6/24/19)           MORPHINE (Verified  Allergy, Severe, ARM SWELLING, HIVES, RED STREAKS,     6/24/19)           PENICILLINS (Verified  Allergy, Severe, ITCHING, HIVES AND SOA, 6/24/19)                  11-16: SPOKE WITH RN; PT STATES CAN TAKE CEPHALOSPORINS;                  CHECKED PREVIOUS ADMISSIONS               IN THE PAST W/O RX           QUINOLONES (Verified  Allergy, Severe,  edema and red streaks on arm,     6/24/19)           AMOXICILLIN TRIHYDRATE (Verified  Allergy, Unknown, RASH,ITCHES, 6/24/19)           POTASSIUM CLAVULANATE (Verified  Allergy, Unknown, RASH,ITCHES, 6/24/19)           ZOLPIDEM TARTRATE (Verified  Allergy, Unknown, INCREASES RESTLESSNESS,     NAUSEA, 6/24/19)           ADHESIVE (Verified  Adverse Reaction, Severe, BLISTERS,RED, 6/24/19)           ASPIRIN (Verified  Adverse Reaction, Unknown, hallucinations, 6/24/19)           OXYCODONE (Verified  Adverse Reaction, Unknown, hallucinations, 6/24/19)      Medications    Last Reconciled on 6/24/19 10:51 by SUSHMA BHAT-Albuterol (Proair HFA) 8.5 Gm Hfa.aer.ad      1 PUFFS INH Q4-6H PRN for SHORTNESS OF BREATH, #1 MDI 5 Refills         Prov: Laura Perales PA-C         6/24/19       Beclomethasone Dipropionate (QNASL) 8.7 Gm Hfa.aer.ad      1 PUFFS NARE EACH QDAY, #1 BOTTLE 11 Refills         Prov: Laura Perales PA-C         6/24/19       Albuterol/Ipratropium (Duoneb) 3 Ml Ampul.neb      3 ML INH Q4H PRN for SHORTNESS OF BREATH, #120 NEB 2 Refills         Prov: Laura Perales PA-C         6/24/19       Umeclidinium Bromide (Incruse Ellipta) 62.5 Mcg Blst.w.dev      1 PUFF INH RTQDAY, #1 MDI 9 Refills         Prov: Luciano Krishnamurthy         4/25/19       Budesonide/Formoterol Fumarate (Symbicort 160/4.5 Mcg) 10.2 Gm Inh      2 PUFF INH RTBID, #1 INH 9 Refills         Prov: Luciano Krishnamurthy         4/25/19       oxyCODONE-Acetaminophen 5-325 MG (oxyCODONE-Acetaminophen 5-325 MG) 1 Each     Tablet      1 TAB PO Q4H PRN for PAIN, #20 TAB 0 Refills         Prov: Lisa Mendoza         2/19/19       Hydrocodone/Acetaminophen 5/325 MG (Hydrocodone/Acetaminophen 5/325 MG) 1 Each     Tablet      2 TAB PO Q4H PRN for PAIN, #20 TAB 0 Refills         Prov: Lisa Mendoza         1/29/19       Neb-Budesonide (Budesonide) 0.5 Mg/2 Ml Ampul.neb      0.5 MG INH RTQDAY, #30 NEB         Reported         8/22/18        Aspirin Chew (Aspirin Baby) 81 Mg Tab.chew      81 MG PO QDAY, #30 TAB.CHEW 0 Refills         Reported         4/16/18       Roflumilast (Daliresp) 500 Mcg Tab      500 MCG PO QDAY for 90 Days, #90 TAB 1 Refill         Prov: Luciano Krishnamurthy         3/6/18       Azelastine Hcl (Azelastine Nasal) 137 Mcg/0.137 Ml Spray.pump      2 PUFFS NARE EACH BID, #1 BOTTLE 9 Refills         Prov: Luciano Krishnamurthy         2/27/18       Cetirizine Hcl (ZyrTEC) 10 Mg Tablet      10 MG PO QDAY, #30 TAB 0 Refills         Reported         7/28/17       chlordiazePOXIDE HCl (Librium*) 25 Mg Capsule      25 MG PO TID, CAP         Reported         7/28/17       Potassium Chloride (Klor-Con) 8 Meq Tablet.er      8 MEQ PO QDAY PRN for EDEMA/SWELLING, TAB         Reported         7/28/17       Desvenlafaxine Succinate (Pristiq) 50 Mg Tab.er.24h      50 MG PO QDAY, TAB.SR         Reported         7/28/17       Albuterol (Proair HFA) 8.5 Gm Inh      2 PUFFS INH RTQ4H, #1 INH 0 Refills         Reported         7/28/17       Furosemide* (Lasix*) 40 Mg Tablet      40 MG PO QDAY PRN for EDEMA/SWELLING, #30 TAB 0 Refills         Reported         7/28/17       Dicyclomine HCl (BENTYL) 20 Mg Tablet      20 MG PO Q6H PRN for ABDOMINAL CRAMPING, #30 TAB         Reported         7/28/17       Diclofenac Sodium (Diclofenac Sodium) 75 Mg Tablet.dr      75 MG PO BID, #60 TAB 0 Refills         Reported         7/28/17       QUEtiapine (SEROquel) 100 Mg Tab      100 MG PO BID, TAB         Reported         6/12/15      Current Medications      Current Medications Reviewed 6/24/19            EXAM      CONSTITUTIONAL: Pleasant  normal conversant.       EYES : Pink conjunctive, no ptosis, PERRL.       ENMT : Nose and ears appear normal, normal dentition, mild posterior pharyngeal     wall erythema, no sinus tenderness. Mallampati classification       Neck: Nontender, no masses, no thyromegaly, no nodules.      Resp : Expiratory wheezing throughout, trace rhonchi, no  crackles, normal work     of breathing noted.        CVS  : No carotid bruits, s1s2 nl, RRR, no murmur, rubs or gallop, no peripheral    edema       Chest wall: Normal rise with inspiration, nontender on palpation.      GI   : Abdomen soft, with no masses, no hepatosplenomegaly, no hernias, BS+      MSK  : Normal gait and station, no digital cyanosis or clubbing       Skin : No rashes, ulcerations or lesions, normal turgor and temperature      Neuro: CN II - XII intact, no sensory deficits, DTRs intact and symmetrical, no     motor weakness      Psych: Appropriate affect, A   Vtials      Vitals:             Height 65 in / 165.1 cm           Weight 194 lbs 9 oz / 88.055675 kg           BSA 1.96 m2           BMI 32.4 kg/m2           Temperature 98.4 F / 36.89 C - Oral           Pulse 83           Respirations 12           Blood Pressure 152/59 Sitting, Left Arm           Pulse Oximetry 96%, room air            REVIEW      Results Reviewed      PCCS Results Reviewed?:  Yes Prev Lab Results, Yes Prev Radiology Results, Yes     Previous Mecial Records      Radiographic Results               [facility]                PACS RADIOLOGY REPORT            Patient: DENI HOLLEY   Acct: #[id number]   Report: #[id number]            UNIT #: [id number]    DOS: 19 1328      INSURANCE:[employer] S   ORDER #:[id number]      LOCATION:CT     : 1960            PROVIDERS      ADMITTING:     ATTENDING: Luciano Krishnamurthy      FAMILY:  BJ DOMINGUEZ   ORDERING:  Luciano Krishnamurthy         OTHER:    DICTATING:  Duane Pack MD            REQ #:[id number]   EXAM:[facility] - LOW DOSE CHEST CT SCREENING      REASON FOR EXAM:  FORMER SMOKER      REASON FOR VISIT:  FORMER SMOKER            *******Signed******         PROCEDURE:   CT LOW DOSE CHEST SCREENING             COMPARISON:   [facility], CT, CHEST W/O CONTRAST, 2018,     19:53.  [facility]        [facility], CT, ABD PEL W/O CONTRAST, 2019, 2:58.             REASON FOR SCREENING:   Patient is 58 years of age, asymptomatic, and has a     smoking history of more       than 30 pack years.      SMOKING STATUS:   Former smoker.  Years since quittin                SCREENING VISIT:   <>                  TECHNIQUE:   Axial unenhanced LDCT images from the apices through mid-kidney     were obtained.       Evaluation of solid organs and vascular structures is suboptimal due to the lack    of IV contrast.       Imaging was performed on a Siemens Sensation 64 CT scanner.             RADIATION:   CT Dose Index Vol (CTDIvol):    3.01  mGy         Dose Length Product (DLP):    103  mGy-cm             DIAGNOSTIC QUALITY:   Satisfactory.               FINDINGS:         Today's study is compared previous CT of the chest performed on 2018.      Today's study reveals       no evidence of mass or adenopathy in the base the neck or in the periclavicular     soft tissues or in       the axillary soft tissues.  No evidence of mass or adenopathy in the mediastinum    or in the right or       left pulmonary malathi.  Benign calcified granulomas are seen in the right     pulmonary malathi.  A 5 mm in       size benign calcified granulomas seen in the posterior lateral superior aspect     the right lower lobe       lung and is unchanged.  No evidence of pneumonia or pleural effusion or     pneumothorax or mass or       metastatic disease in the right or left lungs.  Mild bronchial wall thickening     and mild       bronchiectasis is seen in the pulmonary malathi bilaterally consistent with     bronchitis.  No acute       disease in the superior aspect of the abdomen.  There is mild pericardial     effusion or mild       pericardial thickening surrounding the heart unchanged significantly since     previous study.             CONCLUSION:         1. No evidence of lung cancer.LUNG-RADS CLASSIFICATION:  Lung-RADS 1 - Negative.      No nodules or       definitely benign nodules.  Recommendation: Continue annual screening with LDCT     in 12 months.  <1%       probability of malignancy.  Estimated population prevalence is 90%.  Reference:     ACR Lung-RADS (Lung       CT Screening reporting and data system) Version 1.0 assessment categories     release date April 28, 2014.      2. Either small pericardial effusion or mild pericardial thickening surrounding     the heart unchanged       since previous study performed on 4/16/2018.      3. Mild bronchiectasis and mild bronchitis in the right left pulmonary malathi unc    hanged significantly       since previous study              KWAN XAVIER MD             Electronically Signed and Approved By: KWAN XAVIER MD on 4/24/2019 at     14:50                        Until signed, this is an unconfirmed preliminary report that may contain      errors and is subject to change.                                              JESSICA:      D:04/24/19 1450            Assessment      COPD exacerbation - J44.1            Notes      New Medications      * MDI-Albuterol (Proair HFA) 8.5 GM HFA.AER.AD: 1 PUFFS INH Q4-6H PRN SHORTNESS       OF BREATH #1      * predniSONE* 20 MG TABLET: 40 MG PO QDAY 7 Days #14      * Azithromycin Z-Jose (Zithromax Z-Jose) 250 MG TABLET: 250 MG PO ASDIR #1         Instructions: Take 500 mg (two tablets) by mouth the first day, then 250 mg        (one tablet) daily until all taken.      * Budesonide/Formoterol Fumarate (Symbicort 160/4.5 Mcg) 10.2 GM INH          Sample - Qty 1      * ROFLUMILAST (Daliresp) 250 MCG TABLET          Sample - Qty 3      * UMECLIDINIUM BROMIDE (Incruse Ellipta) 62.5 MCG BLST.W.DEV          Sample - Qty 2      Renewed Medications      * Albuterol/Ipratropium (Duoneb) 3 ML AMPUL.NEB: 3 ML INH Q4H PRN SHORTNESS OF       BREATH #120         Instructions: NPI:[id number] DX:J44.9 COPD      * BECLOMETHASONE DIPROPIONATE (QNASL) 8.7 GM HFA.AER.AD: 1  PUFFS NARE EACH QDAY       #1      Discontinued Medications      * AZELASTINE/FLUTICASONE (Dymista Nasal Spray) 23 GM SPRAY.PUMP          Sample - Qty 2         Instructions: 1 Spray each nostril BID         Dx: COPD (chronic obstructive pulmonary disease) - J44.9      * AZELASTINE/FLUTICASONE (Dymista Nasal [place]) 23 GM SPRAY.PUMP: 1 SPRAYS NARE       EACH BID 30 Days #1      New Office Procedures      * Solu-Medrol 125 MG, As Soon As Possible         METHYLPRED SOD SUCC (Solu-Medrol) 125 MG VIAL: 125 MILLIGRAM INTRAMUSC Qty 1        VIAL         Dx: COPD exacerbation - J44.1      ASSESSMENT:       1. Very severe chronic obstructive pulmonary disease with acute exacerbation.        2. Chronic hypoxic respiratory failure.       3. Tobacco abuse of cigarettes in remission.       4. Seasonal allergies.       5. Postnasal drip.      6. Cough.       7. Wheezing.       8. Dyspnea.             PLAN:      1. I will treat the patient for a chronic obstructive pulmonary disease     exacerbation with a short prednisone burst, intramuscular shot of Solu Medrol     125 mg in the office today and a Z-pack. She has several antibiotics allergies     including penicillin and fluoroquinolones but she has been able to tolerate     azithromycin in the past.        2. Continue Symbicort 160 2 puffs twice daily, continue incruse 1 puff once     daily and continue daily Daliresp. We have given her samples of these today.       3.  I have explained to her that we do not have samples of rescue inhalers such     as ProAir but I will send that to her pharmacy for her and also send DuoNeb for     her to use as needed as those probably have a lower copay than the ProAir does.       4. Continue supplemental oxygen with activities and sleep.       5. I discussed recent low dose lung cancer screening CT scan results with her     showing no suspicious nodules seen. She will need a follow up LDCT in 1 year in     April 2020.      6. Continue on  Qnasl and Singulair for her seasonal allergies along with     azelastine.       7. Follow up in 4 months with Dr. Krishnamurthy, sooner if needed.            Patient Education      Patient Education Provided:  COPD, How to use an Inhaler, How to use a Nebulizer      Time Spent:  > 50% /[facility]                 Disclaimer: Converted document may not contain table formatting or lab diagrams. Please see [employer] for the authenticated document.

## 2021-06-23 ENCOUNTER — TELEPHONE (OUTPATIENT)
Dept: ORTHOPEDIC SURGERY | Facility: CLINIC | Age: 61
End: 2021-06-23

## 2021-06-23 NOTE — TELEPHONE ENCOUNTER
PT FELL ON 6/21 WENT TO [facility] HAS LEFT FOOT FX AND ANKLE PAIN. REQ BEEN PLEASE THANKS

## 2021-07-01 ENCOUNTER — OFFICE VISIT (OUTPATIENT)
Dept: PODIATRY | Facility: CLINIC | Age: 61
End: 2021-07-01

## 2021-07-01 VITALS
TEMPERATURE: 97.8 F | BODY MASS INDEX: 33.32 KG/M2 | OXYGEN SATURATION: 93 % | SYSTOLIC BLOOD PRESSURE: 106 MMHG | DIASTOLIC BLOOD PRESSURE: 48 MMHG | HEIGHT: 65 IN | HEART RATE: 101 BPM | WEIGHT: 200 LBS

## 2021-07-01 DIAGNOSIS — S93.492A SPRAIN OF ANTERIOR TALOFIBULAR LIGAMENT OF LEFT ANKLE, INITIAL ENCOUNTER: ICD-10-CM

## 2021-07-01 DIAGNOSIS — M79.672 FOOT PAIN, LEFT: Primary | ICD-10-CM

## 2021-07-01 PROCEDURE — 99203 OFFICE O/P NEW LOW 30 MIN: CPT | Performed by: PODIATRIST

## 2021-07-01 NOTE — PROGRESS NOTES
[facility] - PODIATRY    Today's Date: 07/01/21    Patient Name: Callie Basilio  MRN: [id number]  CSN: [id number]  PCP: Sean Greene MD  Referring Provider: No ref. provider found    SUBJECTIVE     Chief Complaint   Patient presents with   • Left Ankle - Pain, Ankle Injury     HPI: Callie Basilio, a 61 y.o.female, comes presents to clinic.    New, Established, New Problem: New    Location: Left lateral ankle    Duration: 22 June 2021    Onset: Acute, tripped at home    Nature: Sore, achy    Stable, worsening, improving: Slowly improving    Aggravating factors:  Patient relates pain is aggravated by shoe gear and ambulation.      Previous Treatment: Urgent care visit, x-rays, Cam walker    Patient denies any fevers, chills, nausea, vomiting, shortness of breathe, nor any other constitutional signs nor symptoms.    No other pedal complaints at this time.    Recent medical changes: None    Past Medical History:   Diagnosis Date   • Anxiety    • Arthritis    • Bladder disorder    • Bowel disease    • Chronic allergic rhinitis    • COPD (chronic obstructive pulmonary disease) (CMS/[facility], located within [facility])    • Depression    • Diverticulitis    • Hypertension    • IBS (irritable bowel syndrome)    • Kidney stone    • Lung disease    • Nephrolithiasis 07/27/2015   • Oxygen dependent    • SOB (shortness of breath)      Past Surgical History:   Procedure Laterality Date   • ABDOMINAL HYSTERECTOMY     • APPENDECTOMY     • BLADDER REPAIR     • CHOLECYSTECTOMY     • COLON SURGERY      multiple surgeries, wound vac   • CYSTOSCOPY      AN URETEROSCOPY WITH LASER LITHOTRIPSY   • HERNIA REPAIR     • SHOULDER SURGERY     • TONSILLECTOMY     • URETERAL STENT INSERTION       Family History   Problem Relation Age of Onset   • No Known Problems Mother    • No Known Problems Father    • Diabetes Sister    • Diabetes Brother    • Diabetes Paternal Grandmother      Social History     Socioeconomic History   • Marital status:       Spouse name: Not on file   • Number of children: Not on file   • Years of education: Not on file   • Highest education level: Not on file   Tobacco Use   • Smoking status: Former Smoker   • Smokeless tobacco: Never Used   Vaping Use   • Vaping Use: Never used   Substance and Sexual Activity   • Alcohol use: Never   • Drug use: Never   • Sexual activity: Defer     Allergies   Allergen Reactions   • Adhesive Tape Other (See Comments)     Skin peels off   • Amoxicillin-Pot Clavulanate Itching   • Levofloxacin Swelling   • Morphine Hives   • Oxycodone Mental Status Change   • Quinolones Swelling   • Ambien [Zolpidem] Anxiety   • Penicillins Hives and Rash     Current Outpatient Medications   Medication Sig Dispense Refill   • albuterol (ACCUNEB) 1.25 MG/3ML nebulizer solution      • albuterol sulfate HFA (ProAir HFA) 108 (90 Base) MCG/ACT inhaler      • aspirin (aspirin) 81 MG EC tablet aspirin 81 mg oral tablet,delayed release (DR/EC) take 1 tablet (81 mg) by oral route once daily   Active     • azelastine (ASTELIN) 0.1 % nasal spray azelastine 137 mcg (0.1 %) nasal aerosol,spray spray 2 sprays in each nostril by intranasal route 2 times per day   Active     • Azelastine-Fluticasone (Dymista) 137-50 MCG/ACT suspension Dymista 137-50 mcg/spray nasal spray,non-aerosol spray 1 spray in each nostril by intranasal route 2 times per day   Suspended     • budesonide (PULMICORT) 0.5 MG/2ML nebulizer solution      • budesonide-formoterol (SYMBICORT) 160-4.5 MCG/ACT inhaler Inhale 2 puffs 2 (Two) Times a Day.     • cetirizine (zyrTEC) 10 MG tablet      • chlordiazePOXIDE (LIBRIUM) 25 MG capsule chlordiazepoxide HCl 25 mg oral capsule take 1 capsule (25 mg) by oral route 3 times per day   Active     • desvenlafaxine (PRISTIQ) 50 MG 24 hr tablet desvenlafaxine succinate 50 mg oral tablet extended release 24 hr take 1 tablet (50 mg) by oral route once daily   Active     • diclofenac (VOLTAREN) 75 MG EC tablet diclofenac sodium  75 mg oral tablet,delayed release (DR/EC) take 1 tablet (75 mg) by oral route 2 times per day   Active     • furosemide (Lasix) 40 MG tablet Lasix 40 mg oral tablet take 1 tablet (40 mg) by oral route once daily   Active     • montelukast (SINGULAIR) 10 MG tablet montelukast 10 mg oral tablet take 1 tablet (10 mg) by oral route once daily in the evening   Active     • potassium chloride (MICRO-K) 8 MEQ CR capsule potassium chloride 8 mEq oral capsule, extended release take 1 capsule (8 meq) by oral route once daily   Active     • QUEtiapine (SEROquel) 100 MG tablet Seroquel 100 mg oral tablet take 1 tablet (100 mg) by oral route 2 times per day   Active     • roflumilast (Daliresp) 500 MCG tablet tablet Daliresp 500 mcg oral tablet take 1 tablet (500 mcg) by oral route once daily   Active     • vitamin D (ERGOCALCIFEROL) 1.25 MG (83234 UT) capsule capsule Take 50,000 Units by mouth 1 (One) Time Per Week.       No current facility-administered medications for this visit.     Review of Systems   Constitutional: Negative.    Musculoskeletal:        Painful left anterior lateral ankle   All other systems reviewed and are negative.      OBJECTIVE     Vitals:    21 1255   BP: 106/48   Pulse: 101   Temp: 97.8 °F (36.6 °C)   SpO2: 93%       Patient seen in no apparent distress.      PHYSICAL EXAM:     Foot/Ankle Exam:       General:   Appearance: obesity and elderly    Orientation: AAOx3    Affect: appropriate    Gait: antalgic    Shoe Gear:  CAM boot    VASCULAR      Right Foot Vascularity   Normal vascular exam    Dorsalis pedis:  2+  Posterior tibial:  2+  Skin Temperature: warm    Edema Gradin+ and non-pitting  CFT:  < 3 seconds  Pedal Hair Growth:  Present  Varicosities: none       Left Foot Vascularity   Normal vascular exam    Dorsalis pedis:  2+  Posterior tibial:  2+  Skin Temperature: warm    Edema Gradin+ and non-pitting  CFT:  < 3 seconds  Pedal Hair Growth:  Present  Varicosities: none         NEUROLOGIC     Right Foot Neurologic   Normal sensation    Light touch sensation:  Normal  Vibratory sensation:  Normal  Hot/Cold sensation: normal       Left Foot Neurologic   Normal sensation    Light touch sensation:  Normal  Vibratory sensation:  Normal  Hot/cold sensation: normal       MUSCULOSKELETAL      Left Foot Musculoskeletal   Left foot ecchymosis: Anterior lateral left ankle.  Tenderness comment:  Anterior lateral left ankle     MUSCLE STRENGTH     Right Foot Muscle Strength   Normal strength    Foot dorsiflexion:  5  Foot plantar flexion:  5  Foot inversion:  5  Foot eversion:  5     Left Foot Muscle Strength   Foot dorsiflexion:  5  Foot plantar flexion:  5  Foot inversion:  5  Foot eversion:  5     DERMATOLOGIC     Right Foot Dermatologic   Skin: skin intact       Left Foot Dermatologic   Skin: skin intact       TESTS     Left Foot Tests   Anterior drawer: positive        RADIOLOGY:          XR Ankle 3+ View Left    Result Date: 6/22/2021  Narrative: PROCEDURE: XR ANKLE 3+ VW LEFT  COMPARISON: None  INDICATIONS: Fall with Left foot/ankle twisted under her.  FINDINGS:  No acute fracture lines, dislocations, or focal osseous lesions.  The ankle mortise is symmetric.  Subtalar joint is congruent.  A small plantar calcaneal enthesophyte is present.  There is soft tissue swelling diffusely.  On the AP view , there is an irregular 5 mm calcification along the dorsal and lateral aspect of the midfoot which could be an age-indeterminate avulsion fracture does not only seen on this view and exact location is difficult to discern.  Consider dedicated three views of the left foot for further evaluation.  CONCLUSION: 1. There is a 5 mm calcification on the AP view only along the lateral aspect of the midfoot with adjacent soft tissue swelling.  There is diffuse soft tissue swelling.  This is age indeterminate and of uncertain etiology.  An age-indeterminate avulsion fracture could have this appearance.  Consider  dedicated views of the left foot.  No acute fracture lines.  2. Small Achilles and plantar calcaneal enthesophytes.      NIKOLAY GAMBOA DO       Electronically Signed and Approved By: NIKOLAY GAMBOA DO on 6/22/2021 at 14:16             XR Foot 3+ View Left    Result Date: 6/22/2021  Narrative: PROCEDURE: XR FOOT 3+ VW LEFT  COMPARISON: [facility], CR, XR ANKLE 3+ VW LEFT, 6/22/2021, 14:06.  INDICATIONS: Abnormal ankle film with pain and swelling after fall  FINDINGS:  There is a small calcification adjacent to the anterior aspect of the lateral calcaneus.  No associated fracture line or donor site is seen.  There is some soft tissue swelling in this region so correlation with exact location of patient's pain and tenderness is needed.  Small exostosis along the dorsal medial aspect of the proximal 2nd metatarsal is present as well as small Achilles and plantar calcaneal enthesophytes.  CONCLUSION: Soft tissue swelling around the hindfoot .  Small exostosis versus osteophyte is favored over small avulsion of the anterior lateral aspect of the calcaneus.  No acute fracture lines.  Correlation clinically with mechanism of injury and point tenderness is needed.      NIKOLAY GAMBOA DO       Electronically Signed and Approved By: NIKOLAY GAMBOA DO on 6/22/2021 at 15:12               ASSESSMENT/PLAN     Diagnoses and all orders for this visit:    1. Foot pain, left (Primary)    2. Sprain of anterior talofibular ligament of left ankle, initial encounter  -     Ambulatory Referral to Physical Therapy        Comprehensive lower extremity examination and evaluation was performed.    Discussed findings and treatment plan including risks, benefits, and treatment options with patient in detail. Patient agreed with treatment plan.    An After Visit Summary was printed and given to the patient at discharge, including (if requested) any available informative/educational handouts regarding diagnosis, treatment, or  medications. All questions were answered to patient/family satisfaction. Should symptoms fail to improve or worsen they agree to call or return to clinic or to go to the Emergency Department. Discussed the importance of following up with any needed screening tests/labs/specialist appointments and any requested follow-up recommended by me today. Importance of maintaining follow-up discussed and patient accepts that missed appointments can delay diagnosis and potentially lead to worsening of conditions.    Return in about 4 weeks (around 7/29/2021), or Left ankle sprain, physical therapy follow-up., or sooner if acute issues arise.    This document has been electronically signed by Vicente Montes DPM on July 1, 2021 13:27 EDT

## 2021-07-01 NOTE — PATIENT INSTRUCTIONS
Patient in a cam walker.  Patient transition to supportive regular shoe gear as tolerated.      Patient is to monitor for recurrence and any new symptoms and to contact Dr. Montes's office for a follow-up appointment.      The patient states understanding and agreement with this plan.

## 2021-09-22 ENCOUNTER — OFFICE VISIT (OUTPATIENT)
Dept: FAMILY MEDICINE CLINIC | Facility: CLINIC | Age: 61
End: 2021-09-22

## 2021-09-22 ENCOUNTER — TELEPHONE (OUTPATIENT)
Dept: FAMILY MEDICINE CLINIC | Facility: CLINIC | Age: 61
End: 2021-09-22

## 2021-09-22 VITALS
BODY MASS INDEX: 34.05 KG/M2 | DIASTOLIC BLOOD PRESSURE: 75 MMHG | WEIGHT: 204.4 LBS | RESPIRATION RATE: 18 BRPM | HEART RATE: 89 BPM | HEIGHT: 65 IN | OXYGEN SATURATION: 100 % | SYSTOLIC BLOOD PRESSURE: 129 MMHG | TEMPERATURE: 97.4 F

## 2021-09-22 DIAGNOSIS — E66.1 CLASS 1 DRUG-INDUCED OBESITY WITHOUT SERIOUS COMORBIDITY WITH BODY MASS INDEX (BMI) OF 34.0 TO 34.9 IN ADULT: Chronic | ICD-10-CM

## 2021-09-22 DIAGNOSIS — Z00.00 ANNUAL PHYSICAL EXAM: ICD-10-CM

## 2021-09-22 DIAGNOSIS — E55.9 VITAMIN D DEFICIENCY: ICD-10-CM

## 2021-09-22 DIAGNOSIS — Z51.81 MEDICATION MONITORING ENCOUNTER: ICD-10-CM

## 2021-09-22 DIAGNOSIS — F41.9 ANXIETY AND DEPRESSION: Primary | Chronic | ICD-10-CM

## 2021-09-22 DIAGNOSIS — E78.2 MIXED HYPERLIPIDEMIA: Chronic | ICD-10-CM

## 2021-09-22 DIAGNOSIS — Z13.220 SCREENING FOR LIPID DISORDERS: ICD-10-CM

## 2021-09-22 DIAGNOSIS — F32.A ANXIETY AND DEPRESSION: Primary | Chronic | ICD-10-CM

## 2021-09-22 DIAGNOSIS — R53.83 FATIGUE, UNSPECIFIED TYPE: ICD-10-CM

## 2021-09-22 DIAGNOSIS — J96.11 CHRONIC RESPIRATORY FAILURE WITH HYPOXIA (HCC): Chronic | ICD-10-CM

## 2021-09-22 PROBLEM — Z99.81 OXYGEN DEPENDENT: Status: ACTIVE | Noted: 2021-09-22

## 2021-09-22 PROBLEM — K57.92 DIVERTICULITIS: Status: RESOLVED | Noted: 2021-09-22 | Resolved: 2021-09-22

## 2021-09-22 PROBLEM — J98.4 LUNG DISEASE: Status: ACTIVE | Noted: 2021-09-22

## 2021-09-22 PROBLEM — K58.9 IBS (IRRITABLE BOWEL SYNDROME): Status: ACTIVE | Noted: 2021-09-22

## 2021-09-22 PROBLEM — J45.909 ASTHMA: Status: ACTIVE | Noted: 2021-09-22

## 2021-09-22 PROBLEM — M19.90 ARTHRITIS: Status: ACTIVE | Noted: 2021-09-22

## 2021-09-22 PROBLEM — Z99.81 OXYGEN DEPENDENT: Status: RESOLVED | Noted: 2021-09-22 | Resolved: 2021-09-22

## 2021-09-22 PROBLEM — J44.89 OTHER SPECIFIED CHRONIC OBSTRUCTIVE AIRWAYS DISEASE: Status: ACTIVE | Noted: 2021-09-22

## 2021-09-22 PROBLEM — N20.0 KIDNEY STONE: Status: ACTIVE | Noted: 2021-09-22

## 2021-09-22 PROBLEM — M81.8 OTHER OSTEOPOROSIS WITHOUT CURRENT PATHOLOGICAL FRACTURE: Chronic | Status: ACTIVE | Noted: 2021-09-22

## 2021-09-22 PROBLEM — R06.02 SHORTNESS OF BREATH: Status: ACTIVE | Noted: 2021-09-22

## 2021-09-22 PROBLEM — J30.9 ALLERGIC RHINITIS: Status: ACTIVE | Noted: 2021-09-22

## 2021-09-22 PROBLEM — K57.90 DIVERTICULOSIS: Chronic | Status: ACTIVE | Noted: 2021-09-22

## 2021-09-22 PROBLEM — J98.4 LUNG DISEASE: Status: RESOLVED | Noted: 2021-09-22 | Resolved: 2021-09-22

## 2021-09-22 PROBLEM — R06.02 SHORTNESS OF BREATH: Status: RESOLVED | Noted: 2021-09-22 | Resolved: 2021-09-22

## 2021-09-22 PROBLEM — J44.9 OTHER SPECIFIED CHRONIC OBSTRUCTIVE AIRWAYS DISEASE: Status: ACTIVE | Noted: 2021-09-22

## 2021-09-22 PROBLEM — K57.92 DIVERTICULITIS: Status: ACTIVE | Noted: 2021-09-22

## 2021-09-22 LAB
POC AMPHETAMINES: NEGATIVE
POC BARBITURATES: NEGATIVE
POC BENZODIAZEPHINES: POSITIVE
POC COCAINE: NEGATIVE
POC METHADONE: NEGATIVE
POC METHAMPHETAMINE SCREEN URINE: NEGATIVE
POC OPIATES: NEGATIVE
POC OXYCODONE: NEGATIVE
POC PHENCYCLIDINE: NEGATIVE
POC PROPOXYPHENE: NEGATIVE
POC THC: NEGATIVE
POC TRICYCLIC ANTIDEPRESSANTS: NEGATIVE

## 2021-09-22 PROCEDURE — 99214 OFFICE O/P EST MOD 30 MIN: CPT | Performed by: FAMILY MEDICINE

## 2021-09-22 PROCEDURE — 80305 DRUG TEST PRSMV DIR OPT OBS: CPT | Performed by: FAMILY MEDICINE

## 2021-09-22 RX ORDER — ATORVASTATIN CALCIUM 20 MG/1
20 TABLET, FILM COATED ORAL NIGHTLY
COMMUNITY
Start: 2021-07-02 | End: 2023-02-06 | Stop reason: SDUPTHER

## 2021-09-22 RX ORDER — FLUCONAZOLE 150 MG/1
TABLET ORAL
COMMUNITY
Start: 2021-09-21 | End: 2021-10-07 | Stop reason: SDUPTHER

## 2021-09-22 RX ORDER — FLUTICASONE PROPIONATE 50 MCG
1 SPRAY, SUSPENSION (ML) NASAL 2 TIMES DAILY
COMMUNITY
Start: 2021-06-18

## 2021-09-22 RX ORDER — DESVENLAFAXINE SUCCINATE 50 MG/1
50 TABLET, EXTENDED RELEASE ORAL DAILY
Qty: 30 TABLET | Refills: 5 | Status: SHIPPED | OUTPATIENT
Start: 2021-09-22 | End: 2022-05-09

## 2021-09-22 RX ORDER — ALENDRONATE SODIUM 70 MG/1
70 TABLET ORAL
COMMUNITY
Start: 2021-07-02 | End: 2022-12-21 | Stop reason: SDUPTHER

## 2021-09-22 RX ORDER — PROMETHAZINE HYDROCHLORIDE 25 MG/1
25 TABLET ORAL EVERY 6 HOURS PRN
COMMUNITY

## 2021-09-22 RX ORDER — LOSARTAN POTASSIUM 25 MG/1
25 TABLET ORAL DAILY
COMMUNITY
End: 2022-10-17

## 2021-09-22 RX ORDER — HYDROXYZINE HYDROCHLORIDE 25 MG/1
25 TABLET, FILM COATED ORAL EVERY 8 HOURS PRN
COMMUNITY
Start: 2021-06-18 | End: 2022-08-16

## 2021-09-22 RX ORDER — ROFLUMILAST 500 UG/1
500 TABLET ORAL DAILY
Qty: 90 TABLET | Refills: 1 | Status: SHIPPED | OUTPATIENT
Start: 2021-09-22 | End: 2022-03-07

## 2021-09-22 RX ORDER — DICLOFENAC SODIUM 30 MG/G
3.2 GEL TOPICAL
COMMUNITY
End: 2022-11-17 | Stop reason: SDUPTHER

## 2021-09-22 RX ORDER — BUDESONIDE AND FORMOTEROL FUMARATE DIHYDRATE 160; 4.5 UG/1; UG/1
2 AEROSOL RESPIRATORY (INHALATION)
Qty: 10.2 G | Refills: 5 | Status: SHIPPED | OUTPATIENT
Start: 2021-09-22 | End: 2022-05-27

## 2021-09-22 RX ORDER — HYDROCODONE BITARTRATE AND ACETAMINOPHEN 7.5; 325 MG/1; MG/1
TABLET ORAL
COMMUNITY
Start: 2021-09-21 | End: 2021-10-07

## 2021-09-22 RX ORDER — HYDROCHLOROTHIAZIDE 25 MG/1
TABLET ORAL
COMMUNITY
Start: 2021-09-21 | End: 2022-05-09

## 2021-09-22 RX ORDER — ALBUTEROL SULFATE 90 UG/1
2 AEROSOL, METERED RESPIRATORY (INHALATION)
Qty: 18 G | Refills: 5 | Status: SHIPPED | OUTPATIENT
Start: 2021-09-22 | End: 2022-05-18

## 2021-09-22 NOTE — PROGRESS NOTES
"Chief Complaint  Establish Care, Depression, and blood pressure issues    Subjective          Callie Basilio presents to [facility]  She is here today to establish relations as a new patient. She has a PMH significant for anxiety, depression, COPD, former smoker, chronic hypoxic respiratory failure. She sleeps with 02 and uses it occasionally.     She is taking librium, Seroquel and prestige. She does not tolerate valium. She lost her sister recently, two brother in laws, two cousins and she also lost her brother. She is tearful at times during he encounter.    She has been vaccinated for covid.     The patient has no other complaints today and denies chest pain, shortness of breath, weakness, numbness, nausea, vomiting, diarrhea, dizziness or syncopal event.        Objective   Vital Signs:   /75 (BP Location: Left arm, Patient Position: Sitting)   Pulse 89   Temp 97.4 °F (36.3 °C)   Resp 18   Ht 165.1 cm (65\")   Wt 92.7 kg (204 lb 6.4 oz)   SpO2 100%   BMI 34.01 kg/m²     Physical Exam  Vitals reviewed.   Constitutional:       Appearance: Normal appearance. She is well-developed. She is obese.   HENT:      Head: Normocephalic and atraumatic.      Right Ear: External ear normal.      Left Ear: External ear normal.      Mouth/Throat:      Pharynx: No oropharyngeal exudate.   Eyes:      Conjunctiva/sclera: Conjunctivae normal.      Pupils: Pupils are equal, round, and reactive to light.   Neck:      Vascular: No carotid bruit.   Cardiovascular:      Rate and Rhythm: Normal rate and regular rhythm.      Heart sounds: No murmur heard.   No friction rub. No gallop.    Pulmonary:      Effort: Pulmonary effort is normal.      Breath sounds: Normal breath sounds. No wheezing or rhonchi.   Abdominal:      General: Bowel sounds are normal. There is no distension.      Palpations: Abdomen is soft.      Tenderness: There is no abdominal tenderness.   Skin:     General: Skin is " warm and dry.   Neurological:      Mental Status: She is alert and oriented to person, place, and time.      Cranial Nerves: No cranial nerve deficit.      Motor: No weakness.   Psychiatric:         Mood and Affect: Mood and affect normal.         Behavior: Behavior normal.         Thought Content: Thought content normal.         Judgment: Judgment normal.        Result Review :                               Assessment and Plan    Diagnoses and all orders for this visit:    1. Anxiety and depression (Primary)  Assessment & Plan:  Patient's depression is recurrent and is moderate without psychosis. Their depression is currently active and the condition is unchanged. This will be reassessed in 2 weeks. F/U as described:patient will continue current medication therapy.      2. Screening for lipid disorders  -     Lipid Panel; Future    3. Annual physical exam  -     Comprehensive Metabolic Panel; Future  -     CBC & Differential; Future  -     TSH; Future    4. Vitamin D deficiency  -     Vitamin D 25 hydroxy; Future    5. Fatigue, unspecified type  -     Vitamin B12; Future  -     Folate; Future    6. Medication monitoring encounter  -     POC Urine Drug Screen, Triage    7. Class 1 drug-induced obesity without serious comorbidity with body mass index (BMI) of 34.0 to 34.9 in adult  Assessment & Plan:  Patient's (Body mass index is 34.01 kg/m².) indicates that they are obese (BMI >30) with health conditions that include dyslipidemias . Weight is newly identified. BMI is is above average; BMI management plan is completed. We discussed low calorie, low carb based diet program, portion control and increasing exercise.       8. Chronic respiratory failure with hypoxia (HCC)  Assessment & Plan:  The patient's breathing is currently stable at today's visit.  She is currently not using oxygen in the clinic.  She is satting 100% on room air.  She was encouraged to continue using supplemental O2 at nighttime and as  needed.      Other orders  -     desvenlafaxine (PRISTIQ) 50 MG 24 hr tablet; Take 1 tablet by mouth Daily.  Dispense: 30 tablet; Refill: 5  -     roflumilast (Daliresp) 500 MCG tablet tablet; Take 1 tablet by mouth Daily.  Dispense: 90 tablet; Refill: 1  -     albuterol sulfate HFA (ProAir HFA) 108 (90 Base) MCG/ACT inhaler; Inhale 2 puffs 4 (Four) Times a Day.  Dispense: 18 g; Refill: 5  -     budesonide-formoterol (SYMBICORT) 160-4.5 MCG/ACT inhaler; Inhale 2 puffs 2 (Two) Times a Day.  Dispense: 10.2 g; Refill: 5  -     Beclomethasone Dipropionate 80 MCG/ACT aerosol solution; 1 application into the nostril(s) as directed by provider Daily.  Dispense: 10.6 g; Refill: 5    I spent 52 minutes caring for Callie on this date of service. This time includes time spent by me in the following activities:preparing for the visit, reviewing tests, performing a medically appropriate examination and/or evaluation , counseling and educating the patient/family/caregiver, ordering medications, tests, or procedures and documenting information in the medical record  Follow Up   Return in about 2 weeks (around 10/6/2021).  Patient was given instructions and counseling regarding her condition or for health maintenance advice. Please see specific information pulled into the AVS if appropriate.

## 2021-09-22 NOTE — TELEPHONE ENCOUNTER
Caller: Callie Basilio    Relationship: Self    Best call back number: [phone]    Who are you requesting to speak with (clinical staff, provider,  specific staff member): CLINICAL    What was the call regarding: PATIENT CALLED AND STATED WE FORGOT TO ADD HER Q-NASAL 80 MCG PER SPRAY TO HER MEDICATION LIST AT HER APPOINTMENT TODAY, AND SHE NEEDS IT CALLED IN TO [facility]     Do you require a callback: NO

## 2021-09-23 ENCOUNTER — LAB (OUTPATIENT)
Dept: LAB | Facility: HOSPITAL | Age: 61
End: 2021-09-23

## 2021-09-23 DIAGNOSIS — E55.9 VITAMIN D DEFICIENCY: ICD-10-CM

## 2021-09-23 DIAGNOSIS — Z13.220 SCREENING FOR LIPID DISORDERS: ICD-10-CM

## 2021-09-23 DIAGNOSIS — Z00.00 ANNUAL PHYSICAL EXAM: ICD-10-CM

## 2021-09-23 DIAGNOSIS — R53.83 FATIGUE, UNSPECIFIED TYPE: ICD-10-CM

## 2021-09-23 PROBLEM — N20.0 KIDNEY STONE: Chronic | Status: ACTIVE | Noted: 2021-09-22

## 2021-09-23 PROBLEM — E78.2 MIXED HYPERLIPIDEMIA: Chronic | Status: ACTIVE | Noted: 2021-09-23

## 2021-09-23 PROBLEM — E66.1 CLASS 1 DRUG-INDUCED OBESITY WITHOUT SERIOUS COMORBIDITY WITH BODY MASS INDEX (BMI) OF 34.0 TO 34.9 IN ADULT: Chronic | Status: ACTIVE | Noted: 2021-09-23

## 2021-09-23 PROBLEM — E66.811 CLASS 1 DRUG-INDUCED OBESITY WITHOUT SERIOUS COMORBIDITY WITH BODY MASS INDEX (BMI) OF 34.0 TO 34.9 IN ADULT: Chronic | Status: ACTIVE | Noted: 2021-09-23

## 2021-09-23 LAB
25(OH)D3 SERPL-MCNC: 67.7 NG/ML
ALBUMIN SERPL-MCNC: 4.2 G/DL (ref 3.5–5.2)
ALBUMIN/GLOB SERPL: 1.6 G/DL
ALP SERPL-CCNC: 71 U/L (ref 39–117)
ALT SERPL W P-5'-P-CCNC: 15 U/L (ref 1–33)
ANION GAP SERPL CALCULATED.3IONS-SCNC: 6.5 MMOL/L (ref 5–15)
AST SERPL-CCNC: 12 U/L (ref 1–32)
BASOPHILS # BLD AUTO: 0.06 10*3/MM3 (ref 0–0.2)
BASOPHILS NFR BLD AUTO: 0.9 % (ref 0–1.5)
BILIRUB SERPL-MCNC: 0.3 MG/DL (ref 0–1.2)
BUN SERPL-MCNC: 10 MG/DL (ref 8–23)
BUN/CREAT SERPL: 13.3 (ref 7–25)
CALCIUM SPEC-SCNC: 9.2 MG/DL (ref 8.6–10.5)
CHLORIDE SERPL-SCNC: 101 MMOL/L (ref 98–107)
CHOLEST SERPL-MCNC: 172 MG/DL (ref 0–200)
CO2 SERPL-SCNC: 29.5 MMOL/L (ref 22–29)
CREAT SERPL-MCNC: 0.75 MG/DL (ref 0.57–1)
DEPRECATED RDW RBC AUTO: 46.1 FL (ref 37–54)
EOSINOPHIL # BLD AUTO: 0.19 10*3/MM3 (ref 0–0.4)
EOSINOPHIL NFR BLD AUTO: 2.8 % (ref 0.3–6.2)
ERYTHROCYTE [DISTWIDTH] IN BLOOD BY AUTOMATED COUNT: 12.9 % (ref 12.3–15.4)
FOLATE SERPL-MCNC: 5.67 NG/ML (ref 4.78–24.2)
GFR SERPL CREATININE-BSD FRML MDRD: 79 ML/MIN/1.73
GLOBULIN UR ELPH-MCNC: 2.7 GM/DL
GLUCOSE SERPL-MCNC: 74 MG/DL (ref 65–99)
HCT VFR BLD AUTO: 41.8 % (ref 34–46.6)
HDLC SERPL-MCNC: 57 MG/DL (ref 40–60)
HGB BLD-MCNC: 13.6 G/DL (ref 12–15.9)
IMM GRANULOCYTES # BLD AUTO: 0.02 10*3/MM3 (ref 0–0.05)
IMM GRANULOCYTES NFR BLD AUTO: 0.3 % (ref 0–0.5)
LDLC SERPL CALC-MCNC: 96 MG/DL (ref 0–100)
LDLC/HDLC SERPL: 1.66 {RATIO}
LYMPHOCYTES # BLD AUTO: 2.32 10*3/MM3 (ref 0.7–3.1)
LYMPHOCYTES NFR BLD AUTO: 34.1 % (ref 19.6–45.3)
MCH RBC QN AUTO: 32.2 PG (ref 26.6–33)
MCHC RBC AUTO-ENTMCNC: 32.5 G/DL (ref 31.5–35.7)
MCV RBC AUTO: 99.1 FL (ref 79–97)
MONOCYTES # BLD AUTO: 0.43 10*3/MM3 (ref 0.1–0.9)
MONOCYTES NFR BLD AUTO: 6.3 % (ref 5–12)
NEUTROPHILS NFR BLD AUTO: 3.78 10*3/MM3 (ref 1.7–7)
NEUTROPHILS NFR BLD AUTO: 55.6 % (ref 42.7–76)
NRBC BLD AUTO-RTO: 0 /100 WBC (ref 0–0.2)
PLATELET # BLD AUTO: 266 10*3/MM3 (ref 140–450)
PMV BLD AUTO: 9.9 FL (ref 6–12)
POTASSIUM SERPL-SCNC: 4.1 MMOL/L (ref 3.5–5.2)
PROT SERPL-MCNC: 6.9 G/DL (ref 6–8.5)
RBC # BLD AUTO: 4.22 10*6/MM3 (ref 3.77–5.28)
SODIUM SERPL-SCNC: 137 MMOL/L (ref 136–145)
TRIGL SERPL-MCNC: 103 MG/DL (ref 0–150)
TSH SERPL DL<=0.05 MIU/L-ACNC: 0.77 UIU/ML (ref 0.27–4.2)
VIT B12 BLD-MCNC: 548 PG/ML (ref 211–946)
VLDLC SERPL-MCNC: 19 MG/DL (ref 5–40)
WBC # BLD AUTO: 6.8 10*3/MM3 (ref 3.4–10.8)

## 2021-09-23 PROCEDURE — 82746 ASSAY OF FOLIC ACID SERUM: CPT

## 2021-09-23 PROCEDURE — 80053 COMPREHEN METABOLIC PANEL: CPT

## 2021-09-23 PROCEDURE — 36415 COLL VENOUS BLD VENIPUNCTURE: CPT

## 2021-09-23 PROCEDURE — 80061 LIPID PANEL: CPT

## 2021-09-23 PROCEDURE — 85025 COMPLETE CBC W/AUTO DIFF WBC: CPT

## 2021-09-23 PROCEDURE — 84443 ASSAY THYROID STIM HORMONE: CPT

## 2021-09-23 PROCEDURE — 82306 VITAMIN D 25 HYDROXY: CPT

## 2021-09-23 PROCEDURE — 82607 VITAMIN B-12: CPT

## 2021-09-23 NOTE — ASSESSMENT & PLAN NOTE
Patient's depression is recurrent and is moderate without psychosis. Their depression is currently active and the condition is unchanged. This will be reassessed in 2 weeks. F/U as described:patient will continue current medication therapy.

## 2021-09-23 NOTE — ASSESSMENT & PLAN NOTE
The patient's breathing is currently stable at today's visit.  She is currently not using oxygen in the clinic.  She is satting 100% on room air.  She was encouraged to continue using supplemental O2 at nighttime and as needed.

## 2021-09-23 NOTE — ASSESSMENT & PLAN NOTE
Patient's (Body mass index is 34.01 kg/m².) indicates that they are obese (BMI >30) with health conditions that include dyslipidemias . Weight is newly identified. BMI is is above average; BMI management plan is completed. We discussed low calorie, low carb based diet program, portion control and increasing exercise.

## 2021-09-23 NOTE — ASSESSMENT & PLAN NOTE
Lipid abnormalities are improving with treatment.  Pharmacotherapy as ordered.  Lipids will be reassessed 2 weeks.

## 2021-10-07 ENCOUNTER — OFFICE VISIT (OUTPATIENT)
Dept: FAMILY MEDICINE CLINIC | Facility: CLINIC | Age: 61
End: 2021-10-07

## 2021-10-07 VITALS
HEART RATE: 115 BPM | DIASTOLIC BLOOD PRESSURE: 54 MMHG | TEMPERATURE: 97.9 F | HEIGHT: 65 IN | RESPIRATION RATE: 18 BRPM | WEIGHT: 202.7 LBS | SYSTOLIC BLOOD PRESSURE: 117 MMHG | OXYGEN SATURATION: 95 % | BODY MASS INDEX: 33.77 KG/M2

## 2021-10-07 DIAGNOSIS — B37.0 THRUSH, ORAL: Primary | Chronic | ICD-10-CM

## 2021-10-07 DIAGNOSIS — J30.9 ALLERGIC RHINITIS, UNSPECIFIED SEASONALITY, UNSPECIFIED TRIGGER: Chronic | ICD-10-CM

## 2021-10-07 DIAGNOSIS — I10 PRIMARY HYPERTENSION: Chronic | ICD-10-CM

## 2021-10-07 DIAGNOSIS — E66.09 CLASS 1 OBESITY DUE TO EXCESS CALORIES WITH SERIOUS COMORBIDITY AND BODY MASS INDEX (BMI) OF 33.0 TO 33.9 IN ADULT: ICD-10-CM

## 2021-10-07 DIAGNOSIS — E78.2 MIXED HYPERLIPIDEMIA: Chronic | ICD-10-CM

## 2021-10-07 PROBLEM — E66.1 CLASS 1 DRUG-INDUCED OBESITY WITHOUT SERIOUS COMORBIDITY WITH BODY MASS INDEX (BMI) OF 34.0 TO 34.9 IN ADULT: Chronic | Status: RESOLVED | Noted: 2021-09-23 | Resolved: 2021-10-07

## 2021-10-07 PROBLEM — E66.811 CLASS 1 DRUG-INDUCED OBESITY WITHOUT SERIOUS COMORBIDITY WITH BODY MASS INDEX (BMI) OF 34.0 TO 34.9 IN ADULT: Chronic | Status: RESOLVED | Noted: 2021-09-23 | Resolved: 2021-10-07

## 2021-10-07 PROBLEM — E66.811 CLASS 1 OBESITY DUE TO EXCESS CALORIES WITH SERIOUS COMORBIDITY AND BODY MASS INDEX (BMI) OF 33.0 TO 33.9 IN ADULT: Status: ACTIVE | Noted: 2021-09-23

## 2021-10-07 PROCEDURE — 99214 OFFICE O/P EST MOD 30 MIN: CPT | Performed by: FAMILY MEDICINE

## 2021-10-07 RX ORDER — HYDROCODONE BITARTRATE AND ACETAMINOPHEN 7.5; 325 MG/1; MG/1
1 TABLET ORAL EVERY 6 HOURS PRN
Qty: 120 TABLET | Refills: 0 | Status: SHIPPED | OUTPATIENT
Start: 2021-10-07 | End: 2021-11-12

## 2021-10-07 RX ORDER — FLUCONAZOLE 150 MG/1
150 TABLET ORAL
Qty: 20 TABLET | Refills: 2 | Status: SHIPPED | OUTPATIENT
Start: 2021-10-07 | End: 2022-03-28

## 2021-10-07 RX ORDER — MONTELUKAST SODIUM 10 MG/1
10 TABLET ORAL NIGHTLY
Qty: 90 TABLET | Refills: 1 | Status: SHIPPED | OUTPATIENT
Start: 2021-10-07 | End: 2022-03-07

## 2021-10-07 RX ORDER — MULTIVITAMIN WITH IRON
1 TABLET ORAL DAILY
Qty: 30 TABLET | Refills: 11 | Status: SHIPPED | OUTPATIENT
Start: 2021-10-07 | End: 2022-10-07

## 2021-10-07 NOTE — ASSESSMENT & PLAN NOTE
The patient was given a prescription today of montelukast to be taken with her Zyrtec and Flonase daily.

## 2021-10-07 NOTE — ASSESSMENT & PLAN NOTE
Patient's (Body mass index is 33.73 kg/m².) indicates that they are obese (BMI >30) with health conditions that include hypertension and dyslipidemias . Weight is improving with lifestyle modifications. BMI is is above average; BMI management plan is completed. We discussed low calorie, low carb based diet program, portion control and increasing exercise.

## 2021-10-07 NOTE — PROGRESS NOTES
"Chief Complaint  Follow-up, discuss allergy medication, Sinus Problem, and Thrush    Subjective          Callie Basilio presents to [facility]  She is here today for a follow-up for her chronic medical conditions. She has a PMH significant for anxiety, depression, COPD, former smoker, chronic hypoxic respiratory failure. She sleeps with 02 and uses it occasionally.      She is complaining of sinus congestion and drainage at today's visit.      The patient has no other complaints today and denies chest pain, shortness of breath, weakness, numbness, nausea, vomiting, diarrhea, dizziness or syncopal event.      Objective   Vital Signs:   /54 (BP Location: Left arm, Patient Position: Sitting)   Pulse 115   Temp 97.9 °F (36.6 °C)   Resp 18   Ht 165.1 cm (65\")   Wt 91.9 kg (202 lb 11.2 oz)   SpO2 95%   BMI 33.73 kg/m²     Physical Exam  Vitals reviewed.   Constitutional:       Appearance: Normal appearance. She is well-developed. She is obese.   HENT:      Head: Normocephalic and atraumatic.      Right Ear: External ear normal.      Left Ear: External ear normal.      Mouth/Throat:      Pharynx: No oropharyngeal exudate.   Eyes:      Conjunctiva/sclera: Conjunctivae normal.      Pupils: Pupils are equal, round, and reactive to light.   Neck:      Vascular: No carotid bruit.   Cardiovascular:      Rate and Rhythm: Normal rate and regular rhythm.      Heart sounds: No murmur heard.   No friction rub. No gallop.    Pulmonary:      Effort: Pulmonary effort is normal.      Breath sounds: Normal breath sounds. No wheezing or rhonchi.   Abdominal:      General: Bowel sounds are normal. There is no distension.      Palpations: Abdomen is soft.      Tenderness: There is no abdominal tenderness.   Skin:     General: Skin is warm and dry.   Neurological:      Mental Status: She is alert and oriented to person, place, and time.      Cranial Nerves: No cranial nerve deficit.      Motor: " No weakness.   Psychiatric:         Mood and Affect: Mood and affect normal.         Behavior: Behavior normal.         Thought Content: Thought content normal.         Judgment: Judgment normal.        Result Review :     CMP    CMP 9/23/21   Glucose 74   BUN 10   Creatinine 0.75   eGFR Non African Am 79   Sodium 137   Potassium 4.1   Chloride 101   Calcium 9.2   Albumin 4.20   Total Bilirubin 0.3   Alkaline Phosphatase 71   AST (SGOT) 12   ALT (SGPT) 15           CBC    CBC 9/23/21   WBC 6.80   RBC 4.22   Hemoglobin 13.6   Hematocrit 41.8   MCV 99.1 (A)   MCH 32.2   MCHC 32.5   RDW 12.9   Platelets 266   (A) Abnormal value            Lipid Panel    Lipid Panel 9/23/21   Total Cholesterol 172   Triglycerides 103   HDL Cholesterol 57   VLDL Cholesterol 19   LDL Cholesterol  96   LDL/HDL Ratio 1.66           TSH    TSH 9/23/21   TSH 0.765                     Assessment and Plan    Diagnoses and all orders for this visit:    1. Thrush, oral (Primary)  Assessment & Plan:  She was given a prescription of fluconazole to be taken as directed.       2. Mixed hyperlipidemia  Assessment & Plan:  Lipid abnormalities are improving with treatment.  Nutritional counseling was provided. and Pharmacotherapy as ordered.  Lipids will be reassessed in 6 months.      3. Class 1 obesity due to excess calories with serious comorbidity and body mass index (BMI) of 33.0 to 33.9 in adult  Assessment & Plan:  Patient's (Body mass index is 33.73 kg/m².) indicates that they are obese (BMI >30) with health conditions that include hypertension and dyslipidemias . Weight is improving with lifestyle modifications. BMI is is above average; BMI management plan is completed. We discussed low calorie, low carb based diet program, portion control and increasing exercise.       4. Allergic rhinitis, unspecified seasonality, unspecified trigger  Assessment & Plan:  The patient was given a prescription today of montelukast to be taken with her Zyrtec and  Flonase daily.      5. Primary hypertension  Assessment & Plan:  Hypertension is improving with treatment.  Continue current treatment regimen.  Dietary sodium restriction.  Weight loss.  Continue current medications.  Blood pressure will be reassessed at the next regular appointment.      Other orders  -     montelukast (SINGULAIR) 10 MG tablet; Take 1 tablet by mouth Every Night.  Dispense: 90 tablet; Refill: 1  -     fluconazole (DIFLUCAN) 150 MG tablet; Take 1 tablet by mouth Every Other Day As Needed (trush).  Dispense: 20 tablet; Refill: 2  -     HYDROcodone-acetaminophen (Norco) 7.5-325 MG per tablet; Take 1 tablet by mouth Every 6 (Six) Hours As Needed for Moderate Pain .  Dispense: 120 tablet; Refill: 0  -     B Complex-C (B-complex with vitamin C) tablet; Take 1 tablet by mouth Daily.  Dispense: 30 tablet; Refill: 11      Follow Up   Return in about 3 months (around 1/7/2022).  Patient was given instructions and counseling regarding her condition or for health maintenance advice. Please see specific information pulled into the AVS if appropriate.

## 2021-10-14 ENCOUNTER — TRANSCRIBE ORDERS (OUTPATIENT)
Dept: ADMINISTRATIVE | Facility: HOSPITAL | Age: 61
End: 2021-10-14

## 2021-10-14 ENCOUNTER — HOSPITAL ENCOUNTER (OUTPATIENT)
Dept: GENERAL RADIOLOGY | Facility: HOSPITAL | Age: 61
Discharge: HOME | End: 2021-10-14
Admitting: FAMILY MEDICINE

## 2021-10-14 DIAGNOSIS — M25.512 LEFT SHOULDER PAIN, UNSPECIFIED CHRONICITY: Primary | ICD-10-CM

## 2021-10-14 DIAGNOSIS — M25.512 LEFT SHOULDER PAIN, UNSPECIFIED CHRONICITY: ICD-10-CM

## 2021-10-14 PROCEDURE — 73030 X-RAY EXAM OF SHOULDER: CPT

## 2021-11-12 RX ORDER — HYDROCODONE BITARTRATE AND ACETAMINOPHEN 7.5; 325 MG/1; MG/1
TABLET ORAL
Qty: 120 TABLET | Refills: 0 | Status: SHIPPED | OUTPATIENT
Start: 2021-11-12 | End: 2023-02-06 | Stop reason: SDUPTHER

## 2022-03-07 RX ORDER — ROFLUMILAST 500 UG/1
TABLET ORAL
Qty: 90 TABLET | Refills: 0 | Status: SHIPPED | OUTPATIENT
Start: 2022-03-07 | End: 2022-06-27

## 2022-03-07 RX ORDER — MONTELUKAST SODIUM 10 MG/1
TABLET ORAL
Qty: 90 TABLET | Refills: 0 | Status: SHIPPED | OUTPATIENT
Start: 2022-03-07 | End: 2022-07-19

## 2022-03-11 ENCOUNTER — OFFICE VISIT (OUTPATIENT)
Dept: FAMILY MEDICINE CLINIC | Facility: CLINIC | Age: 62
End: 2022-03-11

## 2022-03-11 VITALS
WEIGHT: 197.6 LBS | HEIGHT: 65 IN | BODY MASS INDEX: 32.92 KG/M2 | OXYGEN SATURATION: 95 % | SYSTOLIC BLOOD PRESSURE: 129 MMHG | RESPIRATION RATE: 18 BRPM | HEART RATE: 85 BPM | DIASTOLIC BLOOD PRESSURE: 60 MMHG

## 2022-03-11 DIAGNOSIS — F41.9 ANXIETY AND DEPRESSION: Primary | Chronic | ICD-10-CM

## 2022-03-11 DIAGNOSIS — E66.09 CLASS 1 OBESITY DUE TO EXCESS CALORIES WITHOUT SERIOUS COMORBIDITY WITH BODY MASS INDEX (BMI) OF 32.0 TO 32.9 IN ADULT: Chronic | ICD-10-CM

## 2022-03-11 DIAGNOSIS — I10 PRIMARY HYPERTENSION: Chronic | ICD-10-CM

## 2022-03-11 DIAGNOSIS — F32.A ANXIETY AND DEPRESSION: Primary | Chronic | ICD-10-CM

## 2022-03-11 DIAGNOSIS — Z78.0 POSTMENOPAUSE: ICD-10-CM

## 2022-03-11 PROCEDURE — 99214 OFFICE O/P EST MOD 30 MIN: CPT | Performed by: FAMILY MEDICINE

## 2022-03-11 RX ORDER — CYCLOBENZAPRINE HCL 10 MG
10 TABLET ORAL 3 TIMES DAILY PRN
COMMUNITY
End: 2022-08-30 | Stop reason: SDUPTHER

## 2022-03-11 NOTE — PROGRESS NOTES
"Chief Complaint  Medication Problem (Discuss medications )    Subjective          Callie Basilio presents to [facility]  She is here today for a follow-up for her chronic medical conditions. She has anxiety, depression, COPD, former smoker, chronic hypoxic respiratory failure. She sleeps with 02 and uses it occasionally.      The patient is complaining today that she is feeling more more overwhelmed daily.  The patient says she is taking all of her medications as prescribed.  The patient was at Dr. Greene's office today and he prescribed her Vraylar.  She is concerned about whether or not she needs to take this medication.  She says her main problem is she just worries excessively and does not get any help from her  at home.  She denies thoughts of suicide and refuses to consider psychiatry today.    The patient has no other complaints today and denies chest pain, shortness of breath, weakness, numbness, nausea, vomiting, diarrhea, dizziness or syncopal event.      Objective   Vital Signs:   /60   Pulse 85   Resp 18   Ht 165.1 cm (65\")   Wt 89.6 kg (197 lb 9.6 oz)   SpO2 95%   BMI 32.88 kg/m²     Physical Exam  Vitals reviewed.   Constitutional:       Appearance: Normal appearance. She is well-developed.   HENT:      Head: Normocephalic and atraumatic.      Right Ear: External ear normal.      Left Ear: External ear normal.      Mouth/Throat:      Pharynx: No oropharyngeal exudate.   Eyes:      Conjunctiva/sclera: Conjunctivae normal.      Pupils: Pupils are equal, round, and reactive to light.   Neck:      Vascular: No carotid bruit.   Cardiovascular:      Rate and Rhythm: Normal rate and regular rhythm.      Heart sounds: No murmur heard.    No friction rub. No gallop.   Pulmonary:      Effort: Pulmonary effort is normal.      Breath sounds: Normal breath sounds. No wheezing or rhonchi.   Abdominal:      General: There is no distension.   Skin:     General: " Skin is warm and dry.   Neurological:      Mental Status: She is alert and oriented to person, place, and time.      Cranial Nerves: No cranial nerve deficit.      Motor: No weakness.   Psychiatric:         Mood and Affect: Mood and affect normal.         Behavior: Behavior normal.         Thought Content: Thought content normal.         Judgment: Judgment normal.        Result Review :     CMP    CMP 9/23/21   Glucose 74   BUN 10   Creatinine 0.75   eGFR Non African Am 79   Sodium 137   Potassium 4.1   Chloride 101   Calcium 9.2   Albumin 4.20   Total Bilirubin 0.3   Alkaline Phosphatase 71   AST (SGOT) 12   ALT (SGPT) 15           CBC    CBC 9/23/21   WBC 6.80   RBC 4.22   Hemoglobin 13.6   Hematocrit 41.8   MCV 99.1 (A)   MCH 32.2   MCHC 32.5   RDW 12.9   Platelets 266   (A) Abnormal value            Lipid Panel    Lipid Panel 9/23/21   Total Cholesterol 172   Triglycerides 103   HDL Cholesterol 57   VLDL Cholesterol 19   LDL Cholesterol  96   LDL/HDL Ratio 1.66           TSH    TSH 9/23/21   TSH 0.765                     Assessment and Plan    Diagnoses and all orders for this visit:    1. Anxiety and depression (Primary)  Assessment & Plan:  Patient's depression is recurrent and is moderate without psychosis. Their depression is currently active and the condition is worsening. This will be reassessed at the next regular appointment. F/U as described:patient was prescribed Latuda at today's visit.  She was told to take 20 mg daily for 1 week.  At that time we will increase the dosage if she is responding to treatment and start titrating off of her Pristiq.  Her symptoms are mirroring bipolar 2 and if she responds to the treatment changes we will continue.    2. Primary hypertension  Assessment & Plan:  Hypertension is improving with treatment.  Continue current treatment regimen.  Dietary sodium restriction.  Weight loss.  Blood pressure will be reassessed at the next regular appointment.      3. Class 1 obesity  due to excess calories without serious comorbidity with body mass index (BMI) of 32.0 to 32.9 in adult  Assessment & Plan:  Patient's (Body mass index is 32.88 kg/m².) indicates that they are obese (BMI >30) with health conditions that include hypertension . Weight is improving with lifestyle modifications. BMI is is above average; BMI management plan is completed. We discussed low calorie, low carb based diet program, portion control and increasing exercise.       4. Postmenopause  -     DEXA Bone Density Axial; Future      Follow Up   Return in about 2 weeks (around 3/25/2022).  Patient was given instructions and counseling regarding her condition or for health maintenance advice. Please see specific information pulled into the AVS if appropriate.

## 2022-03-21 ENCOUNTER — TELEPHONE (OUTPATIENT)
Dept: FAMILY MEDICINE CLINIC | Facility: CLINIC | Age: 62
End: 2022-03-21

## 2022-03-21 DIAGNOSIS — M25.512 ACUTE PAIN OF LEFT SHOULDER: Primary | ICD-10-CM

## 2022-03-21 NOTE — TELEPHONE ENCOUNTER
Caller: Callie Basilio    Relationship: Self    Best call back number: [phone]     What orders are you requesting (i.e. lab or imaging): XRAY    In what timeframe would the patient need to come in: ASAP/BEFORE 3/28/22    Where will you receive your lab/imaging services:     Additional notes: PATIENT IS HAVING TO GO TO [place] FOR [place] [employer] REGARDING HER LEFT SHOULDER FROM WORK INJURY.       PATIENT WOULD LIKE TO HAVE THIS DONE BEFORE Friday.

## 2022-03-21 NOTE — TELEPHONE ENCOUNTER
Pt was just seen on 3/11/2022    Pt is wanting shoulder xray done fore 3/28/2022 please advise thank you

## 2022-03-22 ENCOUNTER — HOSPITAL ENCOUNTER (OUTPATIENT)
Dept: GENERAL RADIOLOGY | Facility: HOSPITAL | Age: 62
Discharge: HOME | End: 2022-03-22
Admitting: FAMILY MEDICINE

## 2022-03-22 ENCOUNTER — TELEPHONE (OUTPATIENT)
Dept: FAMILY MEDICINE CLINIC | Facility: CLINIC | Age: 62
End: 2022-03-22

## 2022-03-22 DIAGNOSIS — M25.512 ACUTE PAIN OF LEFT SHOULDER: ICD-10-CM

## 2022-03-22 PROCEDURE — 73030 X-RAY EXAM OF SHOULDER: CPT

## 2022-03-22 NOTE — TELEPHONE ENCOUNTER
Caller: Callie Basilio    Relationship: Self    Best call back number: [phone]    What is the best time to reach you: ANY    Who are you requesting to speak with (clinical staff, provider,  specific staff member): CLINICAL    What was the call regarding: PATIENT IS NEEDING COPY OF XRAY REPORT AND DISK THAT WAS TAKEN TODAY. SHE HAS AN APPOINTMENT IN [place] ON THE 28TH AND NEEDS TO TAKE IT WITH HER. PLEASE CALL WHEN READY FOR .     Do you require a callback: YES

## 2022-03-23 NOTE — TELEPHONE ENCOUNTER
Spoke to patient I told her that dr atkinson hasnt reviewed the xray just yet so as soon as he does we will call her with the results and print her off a copy , lab can make a disk for her

## 2022-03-24 PROBLEM — E66.09 CLASS 1 OBESITY DUE TO EXCESS CALORIES WITH SERIOUS COMORBIDITY AND BODY MASS INDEX (BMI) OF 33.0 TO 33.9 IN ADULT: Chronic | Status: RESOLVED | Noted: 2021-09-23 | Resolved: 2022-03-24

## 2022-03-24 PROBLEM — E66.811 CLASS 1 OBESITY DUE TO EXCESS CALORIES WITH SERIOUS COMORBIDITY AND BODY MASS INDEX (BMI) OF 33.0 TO 33.9 IN ADULT: Chronic | Status: RESOLVED | Noted: 2021-09-23 | Resolved: 2022-03-24

## 2022-03-24 NOTE — ASSESSMENT & PLAN NOTE
Patient's depression is recurrent and is moderate without psychosis. Their depression is currently active and the condition is worsening. This will be reassessed at the next regular appointment. F/U as described:patient was prescribed an antidepressant medicine.

## 2022-03-24 NOTE — ASSESSMENT & PLAN NOTE
Patient's (Body mass index is 32.88 kg/m².) indicates that they are obese (BMI >30) with health conditions that include hypertension . Weight is improving with lifestyle modifications. BMI is is above average; BMI management plan is completed. We discussed low calorie, low carb based diet program, portion control and increasing exercise.

## 2022-03-28 ENCOUNTER — TELEPHONE (OUTPATIENT)
Dept: FAMILY MEDICINE CLINIC | Facility: CLINIC | Age: 62
End: 2022-03-28

## 2022-03-28 RX ORDER — FLUCONAZOLE 150 MG/1
TABLET ORAL
Qty: 20 TABLET | Refills: 1 | Status: SHIPPED | OUTPATIENT
Start: 2022-03-28 | End: 2022-03-28 | Stop reason: SDUPTHER

## 2022-03-28 RX ORDER — FLUCONAZOLE 150 MG/1
150 TABLET ORAL ONCE
Qty: 20 TABLET | Refills: 1 | Status: SHIPPED | OUTPATIENT
Start: 2022-03-28 | End: 2022-10-17

## 2022-04-01 ENCOUNTER — TELEPHONE (OUTPATIENT)
Dept: FAMILY MEDICINE CLINIC | Facility: CLINIC | Age: 62
End: 2022-04-01

## 2022-04-01 NOTE — TELEPHONE ENCOUNTER
Patient is aware to take her latuda 1 in the evening with 350 calorie for 1 week and 2 in the evening with 350 calorie on week 2. She is aware that she needs to take her pristiq still for the 1st week then  will call and she how she's doing to further instruct tapering off the pristiq,

## 2022-04-06 RX ORDER — DICLOFENAC SODIUM 75 MG/1
TABLET, DELAYED RELEASE ORAL
Qty: 180 TABLET | Refills: 1 | Status: SHIPPED | OUTPATIENT
Start: 2022-04-06 | End: 2022-11-15

## 2022-04-12 ENCOUNTER — TELEPHONE (OUTPATIENT)
Dept: FAMILY MEDICINE CLINIC | Facility: CLINIC | Age: 62
End: 2022-04-12

## 2022-04-12 NOTE — TELEPHONE ENCOUNTER
Caller: Callie Basilio    Relationship: Self    Best call back number: [phone]    What is the best time to reach you: ANY    Who are you requesting to speak with (clinical staff, provider,  specific staff member): CLINICAL    What was the call regarding: PATIENT HAS A QUESTION ON HOW TO TAKE LATUDA, REPORTS SHE HAS TAKEN THE WEEK'S WORTH AND WAS SUPPOSED TO CALL BACK    PLEASE ADVISE    Do you require a callback: YES

## 2022-04-13 NOTE — TELEPHONE ENCOUNTER
Patient out of [facility], still taking prestiq. Wants to know what next step is.   Patient is feeling anxious, tearful, facial twitching in eyebrow, hands shaking, body shaking. Thinks it may be making things worse instead of better. Would like a call back from Dr. West

## 2022-04-14 RX ORDER — FUROSEMIDE 40 MG/1
20 TABLET ORAL 2 TIMES DAILY
Qty: 270 TABLET | Refills: 0 | Status: SHIPPED | OUTPATIENT
Start: 2022-04-14

## 2022-04-17 RX ORDER — CHLORDIAZEPOXIDE HYDROCHLORIDE 25 MG/1
CAPSULE, GELATIN COATED ORAL
Qty: 120 CAPSULE | Refills: 2 | Status: SHIPPED | OUTPATIENT
Start: 2022-04-17 | End: 2022-08-30

## 2022-04-26 RX ORDER — LURASIDONE HYDROCHLORIDE 40 MG/1
40 TABLET, FILM COATED ORAL DAILY
Qty: 30 TABLET | Refills: 1 | Status: SHIPPED | OUTPATIENT
Start: 2022-04-26 | End: 2022-05-09 | Stop reason: SDUPTHER

## 2022-04-28 ENCOUNTER — TELEPHONE (OUTPATIENT)
Dept: FAMILY MEDICINE CLINIC | Facility: CLINIC | Age: 62
End: 2022-04-28

## 2022-04-28 DIAGNOSIS — J45.909 ASTHMA, UNSPECIFIED ASTHMA SEVERITY, UNSPECIFIED WHETHER COMPLICATED, UNSPECIFIED WHETHER PERSISTENT: Primary | ICD-10-CM

## 2022-04-28 NOTE — TELEPHONE ENCOUNTER
Caller: Callie Basilio    Relationship: Self    Best call back number: [phone]    What is the best time to reach you: ANYTIME    Who are you requesting to speak with (clinical staff, provider,  specific staff member): CLINICAL    What was the call regarding:   PATIENT IS REQUESTING CALL REGARDING HER NEBULIZER AND NEBULIZER SUPPLIES. SHE SPOKE WITH SOMEONE ON 04/27/2022 AND WAS TOLD THAT THEY WOULD PLACE A NOTE ON [facility]S DESK.     Do you require a callback: YES

## 2022-04-29 RX ORDER — IPRATROPIUM BROMIDE AND ALBUTEROL SULFATE 2.5; .5 MG/3ML; MG/3ML
3 SOLUTION RESPIRATORY (INHALATION) EVERY 4 HOURS PRN
Qty: 360 ML | Refills: 3 | Status: ON HOLD | OUTPATIENT
Start: 2022-04-29 | End: 2022-06-03 | Stop reason: SDUPTHER

## 2022-04-29 NOTE — TELEPHONE ENCOUNTER
Patient needs written prescription for new nebulizer with supplies. Nebulizer she currently has is from 2008 and she cannot get replacement parts. She needs prescription faxed to [facility] at [phone]    Also patient needs refill on nebulizer solution. Patient stated she takes accuneb and duoneb. Patient also has 3 different inhalers. Please advise what should be ordered for home use

## 2022-05-09 ENCOUNTER — OFFICE VISIT (OUTPATIENT)
Dept: FAMILY MEDICINE CLINIC | Facility: CLINIC | Age: 62
End: 2022-05-09

## 2022-05-09 VITALS
HEART RATE: 89 BPM | SYSTOLIC BLOOD PRESSURE: 107 MMHG | OXYGEN SATURATION: 97 % | BODY MASS INDEX: 33.76 KG/M2 | RESPIRATION RATE: 18 BRPM | TEMPERATURE: 97.5 F | HEIGHT: 65 IN | WEIGHT: 202.6 LBS | DIASTOLIC BLOOD PRESSURE: 45 MMHG

## 2022-05-09 DIAGNOSIS — F32.A ANXIETY AND DEPRESSION: Primary | Chronic | ICD-10-CM

## 2022-05-09 DIAGNOSIS — I10 PRIMARY HYPERTENSION: Chronic | ICD-10-CM

## 2022-05-09 DIAGNOSIS — E66.09 CLASS 1 OBESITY DUE TO EXCESS CALORIES WITHOUT SERIOUS COMORBIDITY WITH BODY MASS INDEX (BMI) OF 33.0 TO 33.9 IN ADULT: Chronic | ICD-10-CM

## 2022-05-09 DIAGNOSIS — F41.9 ANXIETY AND DEPRESSION: Primary | Chronic | ICD-10-CM

## 2022-05-09 PROBLEM — K58.9 IBS (IRRITABLE BOWEL SYNDROME): Chronic | Status: ACTIVE | Noted: 2021-09-22

## 2022-05-09 PROBLEM — J44.9 OTHER SPECIFIED CHRONIC OBSTRUCTIVE AIRWAYS DISEASE: Chronic | Status: ACTIVE | Noted: 2021-09-22

## 2022-05-09 PROBLEM — M19.90 ARTHRITIS: Chronic | Status: ACTIVE | Noted: 2021-09-22

## 2022-05-09 PROBLEM — E66.811 CLASS 1 OBESITY DUE TO EXCESS CALORIES WITHOUT SERIOUS COMORBIDITY WITH BODY MASS INDEX (BMI) OF 32.0 TO 32.9 IN ADULT: Chronic | Status: RESOLVED | Noted: 2021-09-23 | Resolved: 2022-05-09

## 2022-05-09 PROBLEM — J44.89 OTHER SPECIFIED CHRONIC OBSTRUCTIVE AIRWAYS DISEASE: Chronic | Status: ACTIVE | Noted: 2021-09-22

## 2022-05-09 PROBLEM — J45.909 ASTHMA: Chronic | Status: ACTIVE | Noted: 2021-09-22

## 2022-05-09 PROCEDURE — 99214 OFFICE O/P EST MOD 30 MIN: CPT | Performed by: FAMILY MEDICINE

## 2022-05-09 RX ORDER — LURASIDONE HYDROCHLORIDE 40 MG/1
40 TABLET, FILM COATED ORAL DAILY
Qty: 30 TABLET | Refills: 5 | Status: SHIPPED | OUTPATIENT
Start: 2022-05-09 | End: 2022-05-27 | Stop reason: SDUPTHER

## 2022-05-09 RX ORDER — BUDESONIDE, GLYCOPYRROLATE, AND FORMOTEROL FUMARATE 160; 9; 4.8 UG/1; UG/1; UG/1
2 AEROSOL, METERED RESPIRATORY (INHALATION) 2 TIMES DAILY
Qty: 10.7 G | Refills: 5 | Status: SHIPPED | OUTPATIENT
Start: 2022-05-09 | End: 2023-02-06 | Stop reason: SDUPTHER

## 2022-05-09 RX ORDER — BUDESONIDE, GLYCOPYRROLATE, AND FORMOTEROL FUMARATE 160; 9; 4.8 UG/1; UG/1; UG/1
AEROSOL, METERED RESPIRATORY (INHALATION)
COMMUNITY
Start: 2022-04-23 | End: 2022-05-09 | Stop reason: SDUPTHER

## 2022-05-09 NOTE — ASSESSMENT & PLAN NOTE
Patient's (Body mass index is 33.71 kg/m².) indicates that they are obese (BMI >30) with health conditions that include hypertension and dyslipidemias . Weight is unchanged. BMI is is above average; BMI management plan is completed. We discussed low calorie, low carb based diet program, portion control and increasing exercise.

## 2022-05-09 NOTE — PROGRESS NOTES
"Chief Complaint  Medication Problem (Discuss medications ), Anxiety, Depression, and Leg Swelling    Subjective          Callie Basilio presents to [facility]  She is here today for follow-up for her chronic medical conditions.  She has anxiety, depression, COPD, chronic hypoxic respiratory failure and as smoking history.  She sleeps with oxygen and uses it occasionally when she is feeling short of breath.    She is complaining of right shin edema at today's visit.  The patient says since starting on Latuda her mood has gotten much better.  She has quit taking the Pristiq since her last visit.    The patient has no other complaints today and denies chest pain, shortness of breath, weakness, numbness, nausea, vomiting, diarrhea, dizziness or syncopal event.        Objective   Vital Signs:  /45 (BP Location: Left arm, Patient Position: Sitting)   Pulse 89   Temp 97.5 °F (36.4 °C)   Resp 18   Ht 165.1 cm (65\")   Wt 91.9 kg (202 lb 9.6 oz)   SpO2 97%   BMI 33.71 kg/m²           Physical Exam  Vitals reviewed.   Constitutional:       Appearance: Normal appearance. She is well-developed. She is obese.   HENT:      Head: Normocephalic and atraumatic.      Right Ear: External ear normal.      Left Ear: External ear normal.      Mouth/Throat:      Pharynx: No oropharyngeal exudate.   Eyes:      Conjunctiva/sclera: Conjunctivae normal.      Pupils: Pupils are equal, round, and reactive to light.   Neck:      Vascular: No carotid bruit.   Cardiovascular:      Rate and Rhythm: Normal rate and regular rhythm.      Heart sounds: No murmur heard.    No friction rub. No gallop.   Pulmonary:      Effort: Pulmonary effort is normal.      Breath sounds: Normal breath sounds. No wheezing or rhonchi.   Abdominal:      General: There is no distension.   Skin:     General: Skin is warm and dry.   Neurological:      Mental Status: She is alert and oriented to person, place, and time.      " Cranial Nerves: No cranial nerve deficit.      Motor: No weakness.   Psychiatric:         Mood and Affect: Mood and affect normal.         Behavior: Behavior normal.         Thought Content: Thought content normal.         Judgment: Judgment normal.        Result Review :     CMP    CMP 9/23/21   Glucose 74   BUN 10   Creatinine 0.75   eGFR Non African Am 79   Sodium 137   Potassium 4.1   Chloride 101   Calcium 9.2   Albumin 4.20   Total Bilirubin 0.3   Alkaline Phosphatase 71   AST (SGOT) 12   ALT (SGPT) 15           CBC    CBC 9/23/21   WBC 6.80   RBC 4.22   Hemoglobin 13.6   Hematocrit 41.8   MCV 99.1 (A)   MCH 32.2   MCHC 32.5   RDW 12.9   Platelets 266   (A) Abnormal value            Lipid Panel    Lipid Panel 9/23/21   Total Cholesterol 172   Triglycerides 103   HDL Cholesterol 57   VLDL Cholesterol 19   LDL Cholesterol  96   LDL/HDL Ratio 1.66           TSH    TSH 9/23/21   TSH 0.765                     Assessment and Plan    Diagnoses and all orders for this visit:    1. Anxiety and depression (Primary)  Assessment & Plan:  Patient's depression is recurrent and is moderate without psychosis. Their depression is currently active and the condition is improving with treatment. This will be reassessed at the next regular appointment. F/U as described:patient will continue current medication therapy.      2. Class 1 obesity due to excess calories without serious comorbidity with body mass index (BMI) of 33.0 to 33.9 in adult  Assessment & Plan:  Patient's (Body mass index is 33.71 kg/m².) indicates that they are obese (BMI >30) with health conditions that include hypertension and dyslipidemias . Weight is unchanged. BMI is is above average; BMI management plan is completed. We discussed low calorie, low carb based diet program, portion control and increasing exercise.       3. Primary hypertension  Assessment & Plan:  Hypertension is improving with treatment.  Continue current treatment regimen.  Dietary sodium  restriction.  Weight loss.  Blood pressure will be reassessed at the next regular appointment.      Other orders  -     Breztri Aerosphere 160-9-4.8 MCG/ACT aerosol inhaler; Inhale 2 puffs 2 (Two) Times a Day.  Dispense: 10.7 g; Refill: 5  -     lurasidone (Latuda) 40 MG tablet tablet; Take 1 tablet by mouth Daily.  Dispense: 30 tablet; Refill: 5           Follow Up   Return in about 3 months (around 8/9/2022).  Patient was given instructions and counseling regarding her condition or for health maintenance advice. Please see specific information pulled into the AVS if appropriate.

## 2022-05-16 ENCOUNTER — TELEPHONE (OUTPATIENT)
Dept: FAMILY MEDICINE CLINIC | Facility: CLINIC | Age: 62
End: 2022-05-16

## 2022-05-16 NOTE — TELEPHONE ENCOUNTER
Caller: Callie Basilio    Relationship: Self    Best call back number: [phone]    What orders are you requesting (i.e. lab or imaging):   COLOGUADIANA    In what timeframe would the patient need to come in: AS SOON AS POSSIBLE.     Additional notes: PATIENT BELIEVES INSURANCE SHOULD PAY FOR THIS NOW.  ALSO PATIENT IS ASKING ABOUT A TEST SHE HAD DONE FOR SOMETHING WITH DNA AND WOULD LIKE THE RESULTS FROM THAT TEST. PLEASE GIVE HER A CALL REGARDING BOTH OF THESE

## 2022-05-18 ENCOUNTER — HOSPITAL ENCOUNTER (OUTPATIENT)
Dept: BONE DENSITY | Facility: HOSPITAL | Age: 62
Discharge: HOME | End: 2022-05-18
Admitting: FAMILY MEDICINE

## 2022-05-18 DIAGNOSIS — Z78.0 POSTMENOPAUSE: ICD-10-CM

## 2022-05-18 PROCEDURE — 77080 DXA BONE DENSITY AXIAL: CPT

## 2022-05-18 RX ORDER — ALBUTEROL SULFATE 90 UG/1
AEROSOL, METERED RESPIRATORY (INHALATION)
Qty: 18 G | Refills: 4 | Status: ON HOLD | OUTPATIENT
Start: 2022-05-18 | End: 2022-06-03 | Stop reason: SDUPTHER

## 2022-05-23 ENCOUNTER — TELEPHONE (OUTPATIENT)
Dept: FAMILY MEDICINE CLINIC | Facility: CLINIC | Age: 62
End: 2022-05-23

## 2022-05-23 NOTE — TELEPHONE ENCOUNTER
Caller: Callie Basilio    Relationship to patient: Self    Best call back number: [phone]    Chief complaint:RIGHT LEG PAIN/MED     Type of visit: OFFICE VISIT     Requested date: 5-23-22    If rescheduling, when is the original appointment: 6-3-22    Additional notes: PATIENT WOULD ONLY LIKE TO SEE HER PROVIDER LUCY ORTA AND [facility] HAD NO AVAILABILITY UNTIL 6-3-22

## 2022-05-27 ENCOUNTER — OFFICE VISIT (OUTPATIENT)
Dept: FAMILY MEDICINE CLINIC | Facility: CLINIC | Age: 62
End: 2022-05-27

## 2022-05-27 VITALS
BODY MASS INDEX: 33.49 KG/M2 | HEIGHT: 65 IN | DIASTOLIC BLOOD PRESSURE: 60 MMHG | WEIGHT: 201 LBS | RESPIRATION RATE: 19 BRPM | SYSTOLIC BLOOD PRESSURE: 110 MMHG | HEART RATE: 94 BPM | TEMPERATURE: 97.3 F | OXYGEN SATURATION: 97 %

## 2022-05-27 DIAGNOSIS — Z12.11 COLON CANCER SCREENING: ICD-10-CM

## 2022-05-27 DIAGNOSIS — I10 PRIMARY HYPERTENSION: Chronic | ICD-10-CM

## 2022-05-27 DIAGNOSIS — F39 MOOD DISORDER: Primary | Chronic | ICD-10-CM

## 2022-05-27 DIAGNOSIS — E66.09 CLASS 1 OBESITY DUE TO EXCESS CALORIES WITHOUT SERIOUS COMORBIDITY WITH BODY MASS INDEX (BMI) OF 33.0 TO 33.9 IN ADULT: Chronic | ICD-10-CM

## 2022-05-27 DIAGNOSIS — K43.9 VENTRAL HERNIA WITHOUT OBSTRUCTION OR GANGRENE: ICD-10-CM

## 2022-05-27 PROCEDURE — 99214 OFFICE O/P EST MOD 30 MIN: CPT | Performed by: FAMILY MEDICINE

## 2022-05-27 RX ORDER — LURASIDONE HYDROCHLORIDE 80 MG/1
80 TABLET, FILM COATED ORAL DAILY
Qty: 30 TABLET | Refills: 5 | Status: SHIPPED | OUTPATIENT
Start: 2022-05-27 | End: 2022-08-16

## 2022-05-27 RX ORDER — MAGNESIUM OXIDE 400 MG/1
400 TABLET ORAL DAILY
Qty: 30 TABLET | Refills: 5 | Status: SHIPPED | OUTPATIENT
Start: 2022-05-27 | End: 2023-02-06 | Stop reason: SDUPTHER

## 2022-05-27 NOTE — TELEPHONE ENCOUNTER
Patient has appointment today, will  [facility] [employer] results and ask Dr. West for cologaurd order

## 2022-05-27 NOTE — ASSESSMENT & PLAN NOTE
Patient's (Body mass index is 33.45 kg/m².) indicates that they are obese (BMI >30) with health conditions that include hypertension and dyslipidemias . Weight is improving with lifestyle modifications. BMI is is above average; BMI management plan is completed. We discussed low calorie, low carb based diet program, portion control and increasing exercise.

## 2022-05-27 NOTE — PROGRESS NOTES
"Chief Complaint  Depression and Leg Pain (More prominent in right leg from buttock to knee, leg cramps )    Subjective          Callie Basilio presents to [facility]  She is here today for follow-up for her chronic medical conditions.  She has anxiety, depression, COPD, chronic hypoxic respiratory failure and as smoking history.  She sleeps with oxygen and uses it occasionally when she is feeling short of breath.    The patient has increased her Latuda to 40 mg and her mood has gotten much better.      She is concerned about her hernia today. She has had several abdominal surgery and does have mesh.     She is having leg cramps at night.     The patient has no other complaints today and denies chest pain, shortness of breath, weakness, numbness, nausea, vomiting, diarrhea, dizziness or syncopal event.      Objective   Vital Signs:  /60   Pulse 94   Temp 97.3 °F (36.3 °C)   Resp 19   Ht 165.1 cm (65\")   Wt 91.2 kg (201 lb)   SpO2 97%   BMI 33.45 kg/m²         Physical Exam  Vitals reviewed.   Constitutional:       Appearance: Normal appearance. She is well-developed. She is obese.   HENT:      Head: Normocephalic and atraumatic.      Right Ear: External ear normal.      Left Ear: External ear normal.      Mouth/Throat:      Pharynx: No oropharyngeal exudate.   Eyes:      Conjunctiva/sclera: Conjunctivae normal.      Pupils: Pupils are equal, round, and reactive to light.   Neck:      Vascular: No carotid bruit.   Cardiovascular:      Rate and Rhythm: Normal rate and regular rhythm.      Heart sounds: No murmur heard.    No friction rub. No gallop.   Pulmonary:      Effort: Pulmonary effort is normal.      Breath sounds: Normal breath sounds. No wheezing or rhonchi.   Abdominal:      General: There is no distension.   Skin:     General: Skin is warm and dry.   Neurological:      Mental Status: She is alert and oriented to person, place, and time.      Cranial Nerves: No " cranial nerve deficit.      Motor: No weakness.   Psychiatric:         Mood and Affect: Mood and affect normal.         Behavior: Behavior normal.         Thought Content: Thought content normal.         Judgment: Judgment normal.        Result Review :     CMP    CMP 9/23/21   Glucose 74   BUN 10   Creatinine 0.75   eGFR Non African Am 79   Sodium 137   Potassium 4.1   Chloride 101   Calcium 9.2   Albumin 4.20   Total Bilirubin 0.3   Alkaline Phosphatase 71   AST (SGOT) 12   ALT (SGPT) 15           CBC    CBC 9/23/21   WBC 6.80   RBC 4.22   Hemoglobin 13.6   Hematocrit 41.8   MCV 99.1 (A)   MCH 32.2   MCHC 32.5   RDW 12.9   Platelets 266   (A) Abnormal value            Lipid Panel    Lipid Panel 9/23/21   Total Cholesterol 172   Triglycerides 103   HDL Cholesterol 57   VLDL Cholesterol 19   LDL Cholesterol  96   LDL/HDL Ratio 1.66           TSH    TSH 9/23/21   TSH 0.765                     Assessment and Plan    Diagnoses and all orders for this visit:    1. Mood disorder (HCC) (Primary)  Assessment & Plan:  Psychological condition is improving with treatment.  Medication changes per orders.  Psychological condition  will be reassessed in 3 months.      2. Class 1 obesity due to excess calories without serious comorbidity with body mass index (BMI) of 33.0 to 33.9 in adult  Assessment & Plan:  Patient's (Body mass index is 33.45 kg/m².) indicates that they are obese (BMI >30) with health conditions that include hypertension and dyslipidemias . Weight is improving with lifestyle modifications. BMI is is above average; BMI management plan is completed. We discussed low calorie, low carb based diet program, portion control and increasing exercise.       3. Primary hypertension  Assessment & Plan:  Hypertension is improving with treatment.  Continue current treatment regimen.  Dietary sodium restriction.  Weight loss.  Blood pressure will be reassessed at the next regular appointment.      4. Ventral hernia without  obstruction or gangrene  -     Ambulatory Referral to General Surgery    5. Colon cancer screening  -     Cologuard - Stool, Per Rectum; Future    Other orders  -     magnesium oxide (MAG-OX) 400 MG tablet; Take 1 tablet by mouth Daily.  Dispense: 30 tablet; Refill: 5  -     Lurasidone HCl (Latuda) 80 MG tablet tablet; Take 1 tablet by mouth Daily.  Dispense: 30 tablet; Refill: 5           Follow Up   Return in about 3 months (around 8/27/2022).  Patient was given instructions and counseling regarding her condition or for health maintenance advice. Please see specific information pulled into the AVS if appropriate.

## 2022-05-30 ENCOUNTER — HOSPITAL ENCOUNTER (INPATIENT)
Facility: HOSPITAL | Age: 62
LOS: 3 days | Discharge: HOME | End: 2022-06-03
Attending: EMERGENCY MEDICINE | Admitting: INTERNAL MEDICINE

## 2022-05-30 ENCOUNTER — APPOINTMENT (OUTPATIENT)
Dept: GENERAL RADIOLOGY | Facility: HOSPITAL | Age: 62
End: 2022-05-30

## 2022-05-30 DIAGNOSIS — R26.2 DIFFICULTY WALKING: ICD-10-CM

## 2022-05-30 DIAGNOSIS — J45.909 ASTHMA, UNSPECIFIED ASTHMA SEVERITY, UNSPECIFIED WHETHER COMPLICATED, UNSPECIFIED WHETHER PERSISTENT: ICD-10-CM

## 2022-05-30 DIAGNOSIS — J44.1 COPD EXACERBATION: Primary | ICD-10-CM

## 2022-05-30 LAB
ALBUMIN SERPL-MCNC: 3.8 G/DL (ref 3.5–5.2)
ALBUMIN/GLOB SERPL: 1.3 G/DL
ALP SERPL-CCNC: 131 U/L (ref 39–117)
ALT SERPL W P-5'-P-CCNC: 15 U/L (ref 1–33)
ANION GAP SERPL CALCULATED.3IONS-SCNC: 8.7 MMOL/L (ref 5–15)
AST SERPL-CCNC: 10 U/L (ref 1–32)
BASE EXCESS BLDA CALC-SCNC: 3.9 MMOL/L (ref -2–2)
BASOPHILS # BLD AUTO: 0.04 10*3/MM3 (ref 0–0.2)
BASOPHILS NFR BLD AUTO: 0.2 % (ref 0–1.5)
BDY SITE: ABNORMAL
BILIRUB SERPL-MCNC: 0.7 MG/DL (ref 0–1.2)
BUN SERPL-MCNC: 16 MG/DL (ref 8–23)
BUN/CREAT SERPL: 17.4 (ref 7–25)
CALCIUM SPEC-SCNC: 9.7 MG/DL (ref 8.6–10.5)
CHLORIDE SERPL-SCNC: 99 MMOL/L (ref 98–107)
CO2 SERPL-SCNC: 31.3 MMOL/L (ref 22–29)
COHGB MFR BLD: 0.9 % (ref 0–1.5)
CREAT SERPL-MCNC: 0.92 MG/DL (ref 0.57–1)
D-LACTATE SERPL-SCNC: 1.3 MMOL/L (ref 0.5–2)
DEPRECATED RDW RBC AUTO: 59.7 FL (ref 37–54)
EGFRCR SERPLBLD CKD-EPI 2021: 70.5 ML/MIN/1.73
EOSINOPHIL # BLD AUTO: 0.15 10*3/MM3 (ref 0–0.4)
EOSINOPHIL NFR BLD AUTO: 0.8 % (ref 0.3–6.2)
ERYTHROCYTE [DISTWIDTH] IN BLOOD BY AUTOMATED COUNT: 15.5 % (ref 12.3–15.4)
FHHB: 7 % (ref 0–5)
GAS FLOW AIRWAY: 3.5 LPM
GLOBULIN UR ELPH-MCNC: 3 GM/DL
GLUCOSE SERPL-MCNC: 140 MG/DL (ref 65–99)
HCO3 BLDA-SCNC: 30.6 MMOL/L (ref 22–26)
HCT VFR BLD AUTO: 40.6 % (ref 34–46.6)
HGB BLD-MCNC: 13 G/DL (ref 12–15.9)
HGB BLDA-MCNC: 13.6 G/DL (ref 11.7–14.6)
HOLD SPECIMEN: NORMAL
HOLD SPECIMEN: NORMAL
IMM GRANULOCYTES # BLD AUTO: 0.28 10*3/MM3 (ref 0–0.05)
IMM GRANULOCYTES NFR BLD AUTO: 1.6 % (ref 0–0.5)
LACTATE BLDA-SCNC: ABNORMAL MMOL/L
LYMPHOCYTES # BLD AUTO: 2.41 10*3/MM3 (ref 0.7–3.1)
LYMPHOCYTES NFR BLD AUTO: 13.5 % (ref 19.6–45.3)
MCH RBC QN AUTO: 33.3 PG (ref 26.6–33)
MCHC RBC AUTO-ENTMCNC: 32 G/DL (ref 31.5–35.7)
MCV RBC AUTO: 104.1 FL (ref 79–97)
METHGB BLD QL: 0.2 % (ref 0–1.5)
MODALITY: ABNORMAL
MONOCYTES # BLD AUTO: 1.12 10*3/MM3 (ref 0.1–0.9)
MONOCYTES NFR BLD AUTO: 6.3 % (ref 5–12)
NEUTROPHILS NFR BLD AUTO: 13.87 10*3/MM3 (ref 1.7–7)
NEUTROPHILS NFR BLD AUTO: 77.6 % (ref 42.7–76)
NRBC BLD AUTO-RTO: 0 /100 WBC (ref 0–0.2)
NT-PROBNP SERPL-MCNC: 292.9 PG/ML (ref 0–900)
OXYHGB MFR BLDV: 91.9 % (ref 94–99)
PCO2 BLDA: 54.6 MM HG (ref 35–45)
PH BLDA: 7.37 PH UNITS (ref 7.35–7.45)
PLATELET # BLD AUTO: 207 10*3/MM3 (ref 140–450)
PMV BLD AUTO: 10.3 FL (ref 6–12)
PO2 BLDA: 65.7 MM HG (ref 80–100)
POTASSIUM SERPL-SCNC: 3.9 MMOL/L (ref 3.5–5.2)
PROT SERPL-MCNC: 6.8 G/DL (ref 6–8.5)
RBC # BLD AUTO: 3.9 10*6/MM3 (ref 3.77–5.28)
SAO2 % BLDCOA: 92.9 % (ref 95–99)
SODIUM SERPL-SCNC: 139 MMOL/L (ref 136–145)
TROPONIN T SERPL-MCNC: <0.01 NG/ML (ref 0–0.03)
WBC NRBC COR # BLD: 17.87 10*3/MM3 (ref 3.4–10.8)
WHOLE BLOOD HOLD COAG: NORMAL
WHOLE BLOOD HOLD SPECIMEN: NORMAL

## 2022-05-30 PROCEDURE — 80053 COMPREHEN METABOLIC PANEL: CPT

## 2022-05-30 PROCEDURE — 93005 ELECTROCARDIOGRAM TRACING: CPT | Performed by: EMERGENCY MEDICINE

## 2022-05-30 PROCEDURE — 82805 BLOOD GASES W/O2 SATURATION: CPT | Performed by: EMERGENCY MEDICINE

## 2022-05-30 PROCEDURE — 83605 ASSAY OF LACTIC ACID: CPT | Performed by: EMERGENCY MEDICINE

## 2022-05-30 PROCEDURE — 94799 UNLISTED PULMONARY SVC/PX: CPT

## 2022-05-30 PROCEDURE — 71045 X-RAY EXAM CHEST 1 VIEW: CPT

## 2022-05-30 PROCEDURE — 94760 N-INVAS EAR/PLS OXIMETRY 1: CPT

## 2022-05-30 PROCEDURE — 85025 COMPLETE CBC W/AUTO DIFF WBC: CPT

## 2022-05-30 PROCEDURE — 36600 WITHDRAWAL OF ARTERIAL BLOOD: CPT | Performed by: EMERGENCY MEDICINE

## 2022-05-30 PROCEDURE — 84484 ASSAY OF TROPONIN QUANT: CPT

## 2022-05-30 PROCEDURE — 87040 BLOOD CULTURE FOR BACTERIA: CPT | Performed by: EMERGENCY MEDICINE

## 2022-05-30 PROCEDURE — 25010000002 CEFTRIAXONE PER 250 MG: Performed by: EMERGENCY MEDICINE

## 2022-05-30 PROCEDURE — 94664 DEMO&/EVAL PT USE INHALER: CPT

## 2022-05-30 PROCEDURE — 82375 ASSAY CARBOXYHB QUANT: CPT | Performed by: EMERGENCY MEDICINE

## 2022-05-30 PROCEDURE — 83880 ASSAY OF NATRIURETIC PEPTIDE: CPT

## 2022-05-30 PROCEDURE — 94640 AIRWAY INHALATION TREATMENT: CPT

## 2022-05-30 PROCEDURE — 25010000002 AZITHROMYCIN PER 500 MG: Performed by: EMERGENCY MEDICINE

## 2022-05-30 PROCEDURE — 83050 HGB METHEMOGLOBIN QUAN: CPT | Performed by: EMERGENCY MEDICINE

## 2022-05-30 PROCEDURE — 99285 EMERGENCY DEPT VISIT HI MDM: CPT

## 2022-05-30 PROCEDURE — 93005 ELECTROCARDIOGRAM TRACING: CPT

## 2022-05-30 RX ORDER — ALBUTEROL SULFATE 2.5 MG/3ML
2.5 SOLUTION RESPIRATORY (INHALATION) ONCE
Status: COMPLETED | OUTPATIENT
Start: 2022-05-30 | End: 2022-05-30

## 2022-05-30 RX ORDER — SODIUM CHLORIDE 0.9 % (FLUSH) 0.9 %
10 SYRINGE (ML) INJECTION AS NEEDED
Status: DISCONTINUED | OUTPATIENT
Start: 2022-05-30 | End: 2022-06-03 | Stop reason: HOSPADM

## 2022-05-30 RX ORDER — CEFTRIAXONE SODIUM 1 G/50ML
1 INJECTION, SOLUTION INTRAVENOUS ONCE
Status: COMPLETED | OUTPATIENT
Start: 2022-05-30 | End: 2022-05-30

## 2022-05-30 RX ADMIN — AZITHROMYCIN 500 MG: 500 INJECTION, POWDER, LYOPHILIZED, FOR SOLUTION INTRAVENOUS at 22:56

## 2022-05-30 RX ADMIN — CEFTRIAXONE SODIUM 1 G: 1 INJECTION, SOLUTION INTRAVENOUS at 22:45

## 2022-05-30 RX ADMIN — ALBUTEROL SULFATE 2.5 MG: 2.5 SOLUTION RESPIRATORY (INHALATION) at 22:38

## 2022-05-31 ENCOUNTER — APPOINTMENT (OUTPATIENT)
Dept: CT IMAGING | Facility: HOSPITAL | Age: 62
End: 2022-05-31

## 2022-05-31 PROBLEM — J96.10 CHRONIC RESPIRATORY FAILURE: Status: ACTIVE | Noted: 2022-05-31

## 2022-05-31 PROBLEM — J44.1 COPD EXACERBATION: Status: ACTIVE | Noted: 2022-05-31

## 2022-05-31 PROBLEM — E66.9 OBESITY (BMI 30-39.9): Status: ACTIVE | Noted: 2022-05-31

## 2022-05-31 LAB
ANION GAP SERPL CALCULATED.3IONS-SCNC: 13 MMOL/L (ref 5–15)
ANION GAP SERPL CALCULATED.3IONS-SCNC: 7.7 MMOL/L (ref 5–15)
BASOPHILS # BLD AUTO: 0.06 10*3/MM3 (ref 0–0.2)
BASOPHILS # BLD AUTO: 0.08 10*3/MM3 (ref 0–0.2)
BASOPHILS NFR BLD AUTO: 0.3 % (ref 0–1.5)
BASOPHILS NFR BLD AUTO: 0.5 % (ref 0–1.5)
BUN SERPL-MCNC: 14 MG/DL (ref 8–23)
BUN SERPL-MCNC: 15 MG/DL (ref 8–23)
BUN/CREAT SERPL: 17.6 (ref 7–25)
BUN/CREAT SERPL: 17.9 (ref 7–25)
CALCIUM SPEC-SCNC: 9.2 MG/DL (ref 8.6–10.5)
CALCIUM SPEC-SCNC: 9.4 MG/DL (ref 8.6–10.5)
CHLORIDE SERPL-SCNC: 102 MMOL/L (ref 98–107)
CHLORIDE SERPL-SCNC: 103 MMOL/L (ref 98–107)
CO2 SERPL-SCNC: 20 MMOL/L (ref 22–29)
CO2 SERPL-SCNC: 27.3 MMOL/L (ref 22–29)
CREAT SERPL-MCNC: 0.78 MG/DL (ref 0.57–1)
CREAT SERPL-MCNC: 0.85 MG/DL (ref 0.57–1)
D-LACTATE SERPL-SCNC: 1.3 MMOL/L (ref 0.5–2)
DEPRECATED RDW RBC AUTO: 59 FL (ref 37–54)
DEPRECATED RDW RBC AUTO: 60.8 FL (ref 37–54)
EGFRCR SERPLBLD CKD-EPI 2021: 77.6 ML/MIN/1.73
EGFRCR SERPLBLD CKD-EPI 2021: 86 ML/MIN/1.73
EOSINOPHIL # BLD AUTO: 0 10*3/MM3 (ref 0–0.4)
EOSINOPHIL # BLD AUTO: 0 10*3/MM3 (ref 0–0.4)
EOSINOPHIL NFR BLD AUTO: 0 % (ref 0.3–6.2)
EOSINOPHIL NFR BLD AUTO: 0 % (ref 0.3–6.2)
ERYTHROCYTE [DISTWIDTH] IN BLOOD BY AUTOMATED COUNT: 15.3 % (ref 12.3–15.4)
ERYTHROCYTE [DISTWIDTH] IN BLOOD BY AUTOMATED COUNT: 15.4 % (ref 12.3–15.4)
FLUAV AG NPH QL: NEGATIVE
FLUBV AG NPH QL IA: NEGATIVE
GLUCOSE SERPL-MCNC: 193 MG/DL (ref 65–99)
GLUCOSE SERPL-MCNC: 219 MG/DL (ref 65–99)
HCT VFR BLD AUTO: 40.9 % (ref 34–46.6)
HCT VFR BLD AUTO: 41 % (ref 34–46.6)
HGB BLD-MCNC: 12.7 G/DL (ref 12–15.9)
HGB BLD-MCNC: 12.8 G/DL (ref 12–15.9)
IMM GRANULOCYTES # BLD AUTO: 0.17 10*3/MM3 (ref 0–0.05)
IMM GRANULOCYTES # BLD AUTO: 0.24 10*3/MM3 (ref 0–0.05)
IMM GRANULOCYTES NFR BLD AUTO: 1 % (ref 0–0.5)
IMM GRANULOCYTES NFR BLD AUTO: 1.5 % (ref 0–0.5)
L PNEUMO1 AG UR QL IA: NEGATIVE
LYMPHOCYTES # BLD AUTO: 0.28 10*3/MM3 (ref 0.7–3.1)
LYMPHOCYTES # BLD AUTO: 0.47 10*3/MM3 (ref 0.7–3.1)
LYMPHOCYTES NFR BLD AUTO: 1.6 % (ref 19.6–45.3)
LYMPHOCYTES NFR BLD AUTO: 3 % (ref 19.6–45.3)
MACROCYTES BLD QL SMEAR: NORMAL
MCH RBC QN AUTO: 32.7 PG (ref 26.6–33)
MCH RBC QN AUTO: 32.8 PG (ref 26.6–33)
MCHC RBC AUTO-ENTMCNC: 31 G/DL (ref 31.5–35.7)
MCHC RBC AUTO-ENTMCNC: 31.3 G/DL (ref 31.5–35.7)
MCV RBC AUTO: 104.6 FL (ref 79–97)
MCV RBC AUTO: 105.9 FL (ref 79–97)
MONOCYTES # BLD AUTO: 0.16 10*3/MM3 (ref 0.1–0.9)
MONOCYTES # BLD AUTO: 0.2 10*3/MM3 (ref 0.1–0.9)
MONOCYTES NFR BLD AUTO: 1 % (ref 5–12)
MONOCYTES NFR BLD AUTO: 1.2 % (ref 5–12)
NEUTROPHILS NFR BLD AUTO: 14.98 10*3/MM3 (ref 1.7–7)
NEUTROPHILS NFR BLD AUTO: 16.53 10*3/MM3 (ref 1.7–7)
NEUTROPHILS NFR BLD AUTO: 94 % (ref 42.7–76)
NEUTROPHILS NFR BLD AUTO: 95.9 % (ref 42.7–76)
NRBC BLD AUTO-RTO: 0 /100 WBC (ref 0–0.2)
NRBC BLD AUTO-RTO: 0 /100 WBC (ref 0–0.2)
PLATELET # BLD AUTO: 208 10*3/MM3 (ref 140–450)
PLATELET # BLD AUTO: 209 10*3/MM3 (ref 140–450)
PMV BLD AUTO: 10.3 FL (ref 6–12)
PMV BLD AUTO: 10.8 FL (ref 6–12)
POTASSIUM SERPL-SCNC: 4.4 MMOL/L (ref 3.5–5.2)
POTASSIUM SERPL-SCNC: 4.5 MMOL/L (ref 3.5–5.2)
PROCALCITONIN SERPL-MCNC: 0.14 NG/ML (ref 0–0.25)
QT INTERVAL: 344 MS
RBC # BLD AUTO: 3.87 10*6/MM3 (ref 3.77–5.28)
RBC # BLD AUTO: 3.91 10*6/MM3 (ref 3.77–5.28)
S PNEUM AG SPEC QL LA: NEGATIVE
SARS-COV-2 RNA PNL SPEC NAA+PROBE: NOT DETECTED
SMALL PLATELETS BLD QL SMEAR: ADEQUATE
SODIUM SERPL-SCNC: 135 MMOL/L (ref 136–145)
SODIUM SERPL-SCNC: 138 MMOL/L (ref 136–145)
WBC MORPH BLD: NORMAL
WBC NRBC COR # BLD: 15.93 10*3/MM3 (ref 3.4–10.8)
WBC NRBC COR # BLD: 17.24 10*3/MM3 (ref 3.4–10.8)

## 2022-05-31 PROCEDURE — 25010000002 METHYLPREDNISOLONE PER 40 MG: Performed by: STUDENT IN AN ORGANIZED HEALTH CARE EDUCATION/TRAINING PROGRAM

## 2022-05-31 PROCEDURE — 94799 UNLISTED PULMONARY SVC/PX: CPT

## 2022-05-31 PROCEDURE — 25010000002 HYDROMORPHONE 1 MG/ML SOLUTION: Performed by: STUDENT IN AN ORGANIZED HEALTH CARE EDUCATION/TRAINING PROGRAM

## 2022-05-31 PROCEDURE — 25010000002 HEPARIN (PORCINE) PER 1000 UNITS: Performed by: STUDENT IN AN ORGANIZED HEALTH CARE EDUCATION/TRAINING PROGRAM

## 2022-05-31 PROCEDURE — 86631 CHLAMYDIA ANTIBODY: CPT | Performed by: INTERNAL MEDICINE

## 2022-05-31 PROCEDURE — 85007 BL SMEAR W/DIFF WBC COUNT: CPT | Performed by: STUDENT IN AN ORGANIZED HEALTH CARE EDUCATION/TRAINING PROGRAM

## 2022-05-31 PROCEDURE — 86603 ADENOVIRUS ANTIBODY: CPT | Performed by: INTERNAL MEDICINE

## 2022-05-31 PROCEDURE — 97161 PT EVAL LOW COMPLEX 20 MIN: CPT

## 2022-05-31 PROCEDURE — 86713 LEGIONELLA ANTIBODY: CPT | Performed by: INTERNAL MEDICINE

## 2022-05-31 PROCEDURE — 87899 AGENT NOS ASSAY W/OPTIC: CPT | Performed by: STUDENT IN AN ORGANIZED HEALTH CARE EDUCATION/TRAINING PROGRAM

## 2022-05-31 PROCEDURE — 86738 MYCOPLASMA ANTIBODY: CPT | Performed by: INTERNAL MEDICINE

## 2022-05-31 PROCEDURE — 83605 ASSAY OF LACTIC ACID: CPT | Performed by: STUDENT IN AN ORGANIZED HEALTH CARE EDUCATION/TRAINING PROGRAM

## 2022-05-31 PROCEDURE — 85025 COMPLETE CBC W/AUTO DIFF WBC: CPT | Performed by: STUDENT IN AN ORGANIZED HEALTH CARE EDUCATION/TRAINING PROGRAM

## 2022-05-31 PROCEDURE — 87205 SMEAR GRAM STAIN: CPT | Performed by: STUDENT IN AN ORGANIZED HEALTH CARE EDUCATION/TRAINING PROGRAM

## 2022-05-31 PROCEDURE — 0 IOPAMIDOL PER 1 ML: Performed by: EMERGENCY MEDICINE

## 2022-05-31 PROCEDURE — 99222 1ST HOSP IP/OBS MODERATE 55: CPT | Performed by: STUDENT IN AN ORGANIZED HEALTH CARE EDUCATION/TRAINING PROGRAM

## 2022-05-31 PROCEDURE — 87804 INFLUENZA ASSAY W/OPTIC: CPT | Performed by: INTERNAL MEDICINE

## 2022-05-31 PROCEDURE — 25010000002 AZITHROMYCIN PER 500 MG: Performed by: STUDENT IN AN ORGANIZED HEALTH CARE EDUCATION/TRAINING PROGRAM

## 2022-05-31 PROCEDURE — 84145 PROCALCITONIN (PCT): CPT | Performed by: NURSE PRACTITIONER

## 2022-05-31 PROCEDURE — 80048 BASIC METABOLIC PNL TOTAL CA: CPT | Performed by: STUDENT IN AN ORGANIZED HEALTH CARE EDUCATION/TRAINING PROGRAM

## 2022-05-31 PROCEDURE — U0004 COV-19 TEST NON-CDC HGH THRU: HCPCS | Performed by: INTERNAL MEDICINE

## 2022-05-31 PROCEDURE — 63710000001 DIPHENHYDRAMINE PER 50 MG: Performed by: GENERAL PRACTICE

## 2022-05-31 PROCEDURE — 87449 NOS EACH ORGANISM AG IA: CPT | Performed by: STUDENT IN AN ORGANIZED HEALTH CARE EDUCATION/TRAINING PROGRAM

## 2022-05-31 PROCEDURE — 99233 SBSQ HOSP IP/OBS HIGH 50: CPT | Performed by: INTERNAL MEDICINE

## 2022-05-31 PROCEDURE — 71260 CT THORAX DX C+: CPT

## 2022-05-31 RX ORDER — SODIUM CHLORIDE 0.9 % (FLUSH) 0.9 %
10 SYRINGE (ML) INJECTION AS NEEDED
Status: DISCONTINUED | OUTPATIENT
Start: 2022-05-31 | End: 2022-06-01

## 2022-05-31 RX ORDER — CEFTRIAXONE SODIUM 1 G/50ML
1 INJECTION, SOLUTION INTRAVENOUS EVERY 24 HOURS
Status: DISCONTINUED | OUTPATIENT
Start: 2022-05-31 | End: 2022-06-01

## 2022-05-31 RX ORDER — PREDNISONE 20 MG/1
40 TABLET ORAL
Status: DISCONTINUED | OUTPATIENT
Start: 2022-06-01 | End: 2022-06-03 | Stop reason: HOSPADM

## 2022-05-31 RX ORDER — IPRATROPIUM BROMIDE AND ALBUTEROL SULFATE 2.5; .5 MG/3ML; MG/3ML
3 SOLUTION RESPIRATORY (INHALATION)
Status: DISCONTINUED | OUTPATIENT
Start: 2022-05-31 | End: 2022-06-03 | Stop reason: HOSPADM

## 2022-05-31 RX ORDER — SODIUM CHLORIDE 9 MG/ML
75 INJECTION, SOLUTION INTRAVENOUS CONTINUOUS
Status: DISCONTINUED | OUTPATIENT
Start: 2022-05-31 | End: 2022-05-31

## 2022-05-31 RX ORDER — IPRATROPIUM BROMIDE AND ALBUTEROL SULFATE 2.5; .5 MG/3ML; MG/3ML
3 SOLUTION RESPIRATORY (INHALATION)
Status: DISCONTINUED | OUTPATIENT
Start: 2022-05-31 | End: 2022-05-31

## 2022-05-31 RX ORDER — NITROGLYCERIN 0.4 MG/1
0.4 TABLET SUBLINGUAL
Status: DISCONTINUED | OUTPATIENT
Start: 2022-05-31 | End: 2022-06-03 | Stop reason: HOSPADM

## 2022-05-31 RX ORDER — DIPHENHYDRAMINE HCL 25 MG
25 CAPSULE ORAL ONCE
Status: COMPLETED | OUTPATIENT
Start: 2022-05-31 | End: 2022-05-31

## 2022-05-31 RX ORDER — IBUPROFEN 400 MG/1
400 TABLET ORAL EVERY 6 HOURS PRN
Status: DISCONTINUED | OUTPATIENT
Start: 2022-05-31 | End: 2022-06-03 | Stop reason: HOSPADM

## 2022-05-31 RX ORDER — ACETAMINOPHEN 325 MG/1
650 TABLET ORAL EVERY 6 HOURS PRN
Status: DISCONTINUED | OUTPATIENT
Start: 2022-05-31 | End: 2022-06-03 | Stop reason: HOSPADM

## 2022-05-31 RX ORDER — BUDESONIDE 0.5 MG/2ML
0.5 INHALANT ORAL
Status: DISCONTINUED | OUTPATIENT
Start: 2022-05-31 | End: 2022-06-03 | Stop reason: HOSPADM

## 2022-05-31 RX ORDER — SODIUM CHLORIDE 0.9 % (FLUSH) 0.9 %
10 SYRINGE (ML) INJECTION EVERY 12 HOURS SCHEDULED
Status: DISCONTINUED | OUTPATIENT
Start: 2022-05-31 | End: 2022-06-03 | Stop reason: HOSPADM

## 2022-05-31 RX ORDER — METHYLPREDNISOLONE SODIUM SUCCINATE 40 MG/ML
40 INJECTION, POWDER, LYOPHILIZED, FOR SOLUTION INTRAMUSCULAR; INTRAVENOUS EVERY 8 HOURS
Status: DISCONTINUED | OUTPATIENT
Start: 2022-05-31 | End: 2022-05-31

## 2022-05-31 RX ORDER — ARFORMOTEROL TARTRATE 15 UG/2ML
15 SOLUTION RESPIRATORY (INHALATION)
Status: DISCONTINUED | OUTPATIENT
Start: 2022-05-31 | End: 2022-06-03 | Stop reason: HOSPADM

## 2022-05-31 RX ORDER — HEPARIN SODIUM 5000 [USP'U]/ML
5000 INJECTION, SOLUTION INTRAVENOUS; SUBCUTANEOUS EVERY 8 HOURS SCHEDULED
Status: DISCONTINUED | OUTPATIENT
Start: 2022-05-31 | End: 2022-06-03 | Stop reason: HOSPADM

## 2022-05-31 RX ADMIN — BUDESONIDE 0.5 MG: 0.5 SUSPENSION RESPIRATORY (INHALATION) at 20:17

## 2022-05-31 RX ADMIN — ARFORMOTEROL TARTRATE 15 MCG: 15 SOLUTION RESPIRATORY (INHALATION) at 20:17

## 2022-05-31 RX ADMIN — HYDROCODONE POLISTIREX AND CHLORPHENIRAMINE POLISTIREX 2.5 ML: 10; 8 SUSPENSION, EXTENDED RELEASE ORAL at 13:42

## 2022-05-31 RX ADMIN — DIPHENHYDRAMINE HYDROCHLORIDE 25 MG: 25 CAPSULE ORAL at 22:42

## 2022-05-31 RX ADMIN — HEPARIN SODIUM 5000 UNITS: 5000 INJECTION, SOLUTION INTRAVENOUS; SUBCUTANEOUS at 05:37

## 2022-05-31 RX ADMIN — METHYLPREDNISOLONE SODIUM SUCCINATE 40 MG: 40 INJECTION, POWDER, FOR SOLUTION INTRAMUSCULAR; INTRAVENOUS at 03:13

## 2022-05-31 RX ADMIN — HYDROMORPHONE HYDROCHLORIDE 0.5 MG: 1 INJECTION, SOLUTION INTRAMUSCULAR; INTRAVENOUS; SUBCUTANEOUS at 05:37

## 2022-05-31 RX ADMIN — BUDESONIDE 0.5 MG: 0.5 SUSPENSION RESPIRATORY (INHALATION) at 09:27

## 2022-05-31 RX ADMIN — ARFORMOTEROL TARTRATE 15 MCG: 15 SOLUTION RESPIRATORY (INHALATION) at 09:27

## 2022-05-31 RX ADMIN — SODIUM CHLORIDE 75 ML/HR: 9 INJECTION, SOLUTION INTRAVENOUS at 03:14

## 2022-05-31 RX ADMIN — HEPARIN SODIUM 5000 UNITS: 5000 INJECTION, SOLUTION INTRAVENOUS; SUBCUTANEOUS at 13:43

## 2022-05-31 RX ADMIN — AZITHROMYCIN 500 MG: 500 INJECTION, POWDER, LYOPHILIZED, FOR SOLUTION INTRAVENOUS at 20:54

## 2022-05-31 RX ADMIN — IPRATROPIUM BROMIDE AND ALBUTEROL SULFATE 3 ML: .5; 3 SOLUTION RESPIRATORY (INHALATION) at 07:32

## 2022-05-31 RX ADMIN — IOPAMIDOL 100 ML: 755 INJECTION, SOLUTION INTRAVENOUS at 02:10

## 2022-05-31 RX ADMIN — ACETAMINOPHEN 650 MG: 325 TABLET ORAL at 10:51

## 2022-05-31 RX ADMIN — METHYLPREDNISOLONE SODIUM SUCCINATE 40 MG: 40 INJECTION, POWDER, FOR SOLUTION INTRAMUSCULAR; INTRAVENOUS at 08:41

## 2022-05-31 RX ADMIN — IPRATROPIUM BROMIDE AND ALBUTEROL SULFATE 3 ML: 2.5; .5 SOLUTION RESPIRATORY (INHALATION) at 20:17

## 2022-05-31 RX ADMIN — IPRATROPIUM BROMIDE AND ALBUTEROL SULFATE 3 ML: 2.5; .5 SOLUTION RESPIRATORY (INHALATION) at 12:00

## 2022-06-01 LAB
ALBUMIN SERPL-MCNC: 3.2 G/DL (ref 3.5–5.2)
ALBUMIN/GLOB SERPL: 1 G/DL
ALP SERPL-CCNC: 110 U/L (ref 39–117)
ALT SERPL W P-5'-P-CCNC: 18 U/L (ref 1–33)
ANION GAP SERPL CALCULATED.3IONS-SCNC: 8.6 MMOL/L (ref 5–15)
AST SERPL-CCNC: 9 U/L (ref 1–32)
BASOPHILS # BLD AUTO: 0.02 10*3/MM3 (ref 0–0.2)
BASOPHILS NFR BLD AUTO: 0.1 % (ref 0–1.5)
BILIRUB SERPL-MCNC: <0.2 MG/DL (ref 0–1.2)
BUN SERPL-MCNC: 16 MG/DL (ref 8–23)
BUN/CREAT SERPL: 22.5 (ref 7–25)
CALCIUM SPEC-SCNC: 9 MG/DL (ref 8.6–10.5)
CHLORIDE SERPL-SCNC: 102 MMOL/L (ref 98–107)
CO2 SERPL-SCNC: 26.4 MMOL/L (ref 22–29)
CREAT SERPL-MCNC: 0.71 MG/DL (ref 0.57–1)
DEPRECATED RDW RBC AUTO: 58.3 FL (ref 37–54)
EGFRCR SERPLBLD CKD-EPI 2021: 96.3 ML/MIN/1.73
EOSINOPHIL # BLD AUTO: 0 10*3/MM3 (ref 0–0.4)
EOSINOPHIL NFR BLD AUTO: 0 % (ref 0.3–6.2)
ERYTHROCYTE [DISTWIDTH] IN BLOOD BY AUTOMATED COUNT: 15.2 % (ref 12.3–15.4)
GLOBULIN UR ELPH-MCNC: 3.1 GM/DL
GLUCOSE SERPL-MCNC: 229 MG/DL (ref 65–99)
HBA1C MFR BLD: 5.8 % (ref 4.8–5.6)
HCT VFR BLD AUTO: 36.6 % (ref 34–46.6)
HGB BLD-MCNC: 11.4 G/DL (ref 12–15.9)
IMM GRANULOCYTES # BLD AUTO: 0.16 10*3/MM3 (ref 0–0.05)
IMM GRANULOCYTES NFR BLD AUTO: 1.2 % (ref 0–0.5)
LYMPHOCYTES # BLD AUTO: 0.69 10*3/MM3 (ref 0.7–3.1)
LYMPHOCYTES NFR BLD AUTO: 5 % (ref 19.6–45.3)
MAGNESIUM SERPL-MCNC: 2.1 MG/DL (ref 1.6–2.4)
MCH RBC QN AUTO: 32.3 PG (ref 26.6–33)
MCHC RBC AUTO-ENTMCNC: 31.1 G/DL (ref 31.5–35.7)
MCV RBC AUTO: 103.7 FL (ref 79–97)
MONOCYTES # BLD AUTO: 0.6 10*3/MM3 (ref 0.1–0.9)
MONOCYTES NFR BLD AUTO: 4.4 % (ref 5–12)
NEUTROPHILS NFR BLD AUTO: 12.29 10*3/MM3 (ref 1.7–7)
NEUTROPHILS NFR BLD AUTO: 89.3 % (ref 42.7–76)
NRBC BLD AUTO-RTO: 0 /100 WBC (ref 0–0.2)
PLATELET # BLD AUTO: 221 10*3/MM3 (ref 140–450)
PMV BLD AUTO: 10.5 FL (ref 6–12)
POTASSIUM SERPL-SCNC: 4.4 MMOL/L (ref 3.5–5.2)
PROT SERPL-MCNC: 6.3 G/DL (ref 6–8.5)
RBC # BLD AUTO: 3.53 10*6/MM3 (ref 3.77–5.28)
SODIUM SERPL-SCNC: 137 MMOL/L (ref 136–145)
WBC NRBC COR # BLD: 13.76 10*3/MM3 (ref 3.4–10.8)

## 2022-06-01 PROCEDURE — 99232 SBSQ HOSP IP/OBS MODERATE 35: CPT | Performed by: INTERNAL MEDICINE

## 2022-06-01 PROCEDURE — 94799 UNLISTED PULMONARY SVC/PX: CPT

## 2022-06-01 PROCEDURE — 80053 COMPREHEN METABOLIC PANEL: CPT | Performed by: INTERNAL MEDICINE

## 2022-06-01 PROCEDURE — 63710000001 PREDNISONE PER 1 MG: Performed by: NURSE PRACTITIONER

## 2022-06-01 PROCEDURE — 94664 DEMO&/EVAL PT USE INHALER: CPT

## 2022-06-01 PROCEDURE — 25010000002 HEPARIN (PORCINE) PER 1000 UNITS: Performed by: STUDENT IN AN ORGANIZED HEALTH CARE EDUCATION/TRAINING PROGRAM

## 2022-06-01 PROCEDURE — 85025 COMPLETE CBC W/AUTO DIFF WBC: CPT | Performed by: STUDENT IN AN ORGANIZED HEALTH CARE EDUCATION/TRAINING PROGRAM

## 2022-06-01 PROCEDURE — 83735 ASSAY OF MAGNESIUM: CPT | Performed by: INTERNAL MEDICINE

## 2022-06-01 PROCEDURE — 83036 HEMOGLOBIN GLYCOSYLATED A1C: CPT | Performed by: INTERNAL MEDICINE

## 2022-06-01 PROCEDURE — 99233 SBSQ HOSP IP/OBS HIGH 50: CPT | Performed by: INTERNAL MEDICINE

## 2022-06-01 PROCEDURE — 25010000002 CEFTRIAXONE PER 250 MG: Performed by: STUDENT IN AN ORGANIZED HEALTH CARE EDUCATION/TRAINING PROGRAM

## 2022-06-01 RX ORDER — ATORVASTATIN CALCIUM 20 MG/1
20 TABLET, FILM COATED ORAL NIGHTLY
Status: DISCONTINUED | OUTPATIENT
Start: 2022-06-01 | End: 2022-06-03 | Stop reason: HOSPADM

## 2022-06-01 RX ORDER — ROFLUMILAST 500 UG/1
500 TABLET ORAL DAILY
Status: DISCONTINUED | OUTPATIENT
Start: 2022-06-01 | End: 2022-06-03 | Stop reason: HOSPADM

## 2022-06-01 RX ORDER — CYCLOBENZAPRINE HCL 10 MG
10 TABLET ORAL 3 TIMES DAILY PRN
Status: DISCONTINUED | OUTPATIENT
Start: 2022-06-01 | End: 2022-06-03 | Stop reason: HOSPADM

## 2022-06-01 RX ORDER — ONDANSETRON 4 MG/1
4 TABLET, FILM COATED ORAL EVERY 6 HOURS PRN
Status: DISCONTINUED | OUTPATIENT
Start: 2022-06-01 | End: 2022-06-03 | Stop reason: HOSPADM

## 2022-06-01 RX ORDER — QUETIAPINE FUMARATE 25 MG/1
100 TABLET, FILM COATED ORAL 2 TIMES DAILY
Status: DISCONTINUED | OUTPATIENT
Start: 2022-06-01 | End: 2022-06-03 | Stop reason: HOSPADM

## 2022-06-01 RX ORDER — HYDROCODONE BITARTRATE AND ACETAMINOPHEN 7.5; 325 MG/1; MG/1
1 TABLET ORAL EVERY 4 HOURS PRN
Status: DISCONTINUED | OUTPATIENT
Start: 2022-06-01 | End: 2022-06-01

## 2022-06-01 RX ORDER — CHLORDIAZEPOXIDE HYDROCHLORIDE 25 MG/1
25 CAPSULE, GELATIN COATED ORAL 4 TIMES DAILY
Status: DISCONTINUED | OUTPATIENT
Start: 2022-06-01 | End: 2022-06-01

## 2022-06-01 RX ORDER — ONDANSETRON 2 MG/ML
4 INJECTION INTRAMUSCULAR; INTRAVENOUS EVERY 6 HOURS PRN
Status: DISCONTINUED | OUTPATIENT
Start: 2022-06-01 | End: 2022-06-03 | Stop reason: HOSPADM

## 2022-06-01 RX ORDER — QUETIAPINE FUMARATE 25 MG/1
100 TABLET, FILM COATED ORAL NIGHTLY
Status: DISCONTINUED | OUTPATIENT
Start: 2022-06-01 | End: 2022-06-01

## 2022-06-01 RX ORDER — AZELASTINE 1 MG/ML
1 SPRAY, METERED NASAL 2 TIMES DAILY
Status: DISCONTINUED | OUTPATIENT
Start: 2022-06-01 | End: 2022-06-03 | Stop reason: HOSPADM

## 2022-06-01 RX ORDER — AZITHROMYCIN 250 MG/1
500 TABLET, FILM COATED ORAL
Status: DISCONTINUED | OUTPATIENT
Start: 2022-06-01 | End: 2022-06-03 | Stop reason: HOSPADM

## 2022-06-01 RX ORDER — FUROSEMIDE 20 MG/1
20 TABLET ORAL 2 TIMES DAILY
Status: DISCONTINUED | OUTPATIENT
Start: 2022-06-01 | End: 2022-06-03 | Stop reason: HOSPADM

## 2022-06-01 RX ORDER — BISACODYL 5 MG/1
5 TABLET, DELAYED RELEASE ORAL DAILY PRN
Status: DISCONTINUED | OUTPATIENT
Start: 2022-06-01 | End: 2022-06-03 | Stop reason: HOSPADM

## 2022-06-01 RX ORDER — BISACODYL 10 MG
10 SUPPOSITORY, RECTAL RECTAL DAILY PRN
Status: DISCONTINUED | OUTPATIENT
Start: 2022-06-01 | End: 2022-06-03 | Stop reason: HOSPADM

## 2022-06-01 RX ORDER — ALUMINA, MAGNESIA, AND SIMETHICONE 2400; 2400; 240 MG/30ML; MG/30ML; MG/30ML
15 SUSPENSION ORAL EVERY 6 HOURS PRN
Status: DISCONTINUED | OUTPATIENT
Start: 2022-06-01 | End: 2022-06-03 | Stop reason: HOSPADM

## 2022-06-01 RX ORDER — AMOXICILLIN 250 MG
2 CAPSULE ORAL 2 TIMES DAILY
Status: DISCONTINUED | OUTPATIENT
Start: 2022-06-01 | End: 2022-06-03 | Stop reason: HOSPADM

## 2022-06-01 RX ORDER — MONTELUKAST SODIUM 10 MG/1
10 TABLET ORAL NIGHTLY
Status: DISCONTINUED | OUTPATIENT
Start: 2022-06-01 | End: 2022-06-03 | Stop reason: HOSPADM

## 2022-06-01 RX ORDER — CHLORDIAZEPOXIDE HYDROCHLORIDE 25 MG/1
25 CAPSULE, GELATIN COATED ORAL 4 TIMES DAILY PRN
Status: DISCONTINUED | OUTPATIENT
Start: 2022-06-01 | End: 2022-06-03 | Stop reason: HOSPADM

## 2022-06-01 RX ORDER — ASPIRIN 81 MG/1
81 TABLET ORAL DAILY
Status: DISCONTINUED | OUTPATIENT
Start: 2022-06-01 | End: 2022-06-03 | Stop reason: HOSPADM

## 2022-06-01 RX ORDER — POLYETHYLENE GLYCOL 3350 17 G/17G
17 POWDER, FOR SOLUTION ORAL DAILY PRN
Status: DISCONTINUED | OUTPATIENT
Start: 2022-06-01 | End: 2022-06-03 | Stop reason: HOSPADM

## 2022-06-01 RX ORDER — CETIRIZINE HYDROCHLORIDE 10 MG/1
10 TABLET ORAL DAILY
Status: DISCONTINUED | OUTPATIENT
Start: 2022-06-01 | End: 2022-06-03 | Stop reason: HOSPADM

## 2022-06-01 RX ADMIN — BUDESONIDE 0.5 MG: 0.5 SUSPENSION RESPIRATORY (INHALATION) at 07:54

## 2022-06-01 RX ADMIN — ASPIRIN 81 MG: 81 TABLET, COATED ORAL at 09:07

## 2022-06-01 RX ADMIN — CHLORDIAZEPOXIDE HYDROCHLORIDE 25 MG: 25 CAPSULE ORAL at 21:21

## 2022-06-01 RX ADMIN — HEPARIN SODIUM 5000 UNITS: 5000 INJECTION, SOLUTION INTRAVENOUS; SUBCUTANEOUS at 00:30

## 2022-06-01 RX ADMIN — Medication 10 ML: at 09:13

## 2022-06-01 RX ADMIN — ROFLUMILAST 500 MCG: 500 TABLET ORAL at 09:07

## 2022-06-01 RX ADMIN — MONTELUKAST 10 MG: 10 TABLET, FILM COATED ORAL at 01:28

## 2022-06-01 RX ADMIN — QUETIAPINE FUMARATE 100 MG: 25 TABLET ORAL at 21:21

## 2022-06-01 RX ADMIN — AZELASTINE HYDROCHLORIDE 1 SPRAY: 137 SPRAY, METERED NASAL at 09:07

## 2022-06-01 RX ADMIN — ARFORMOTEROL TARTRATE 15 MCG: 15 SOLUTION RESPIRATORY (INHALATION) at 18:31

## 2022-06-01 RX ADMIN — BUDESONIDE 0.5 MG: 0.5 SUSPENSION RESPIRATORY (INHALATION) at 18:31

## 2022-06-01 RX ADMIN — FUROSEMIDE 20 MG: 20 TABLET ORAL at 09:07

## 2022-06-01 RX ADMIN — AZELASTINE HYDROCHLORIDE 1 SPRAY: 137 SPRAY, METERED NASAL at 01:26

## 2022-06-01 RX ADMIN — CEFTRIAXONE SODIUM 1 G: 1 INJECTION, SOLUTION INTRAVENOUS at 00:21

## 2022-06-01 RX ADMIN — Medication 10 ML: at 21:23

## 2022-06-01 RX ADMIN — ATORVASTATIN CALCIUM 20 MG: 20 TABLET, FILM COATED ORAL at 21:23

## 2022-06-01 RX ADMIN — HEPARIN SODIUM 5000 UNITS: 5000 INJECTION, SOLUTION INTRAVENOUS; SUBCUTANEOUS at 15:26

## 2022-06-01 RX ADMIN — IPRATROPIUM BROMIDE AND ALBUTEROL SULFATE 3 ML: 2.5; .5 SOLUTION RESPIRATORY (INHALATION) at 01:51

## 2022-06-01 RX ADMIN — IPRATROPIUM BROMIDE AND ALBUTEROL SULFATE 3 ML: 2.5; .5 SOLUTION RESPIRATORY (INHALATION) at 18:31

## 2022-06-01 RX ADMIN — PREDNISONE 40 MG: 20 TABLET ORAL at 07:34

## 2022-06-01 RX ADMIN — HYDROCODONE POLISTIREX AND CHLORPHENIRAMINE POLISTIREX 2.5 ML: 10; 8 SUSPENSION, EXTENDED RELEASE ORAL at 01:25

## 2022-06-01 RX ADMIN — MONTELUKAST 10 MG: 10 TABLET, FILM COATED ORAL at 21:21

## 2022-06-01 RX ADMIN — HYDROCODONE BITARTRATE AND ACETAMINOPHEN 1 TABLET: 7.5; 325 TABLET ORAL at 07:34

## 2022-06-01 RX ADMIN — AZITHROMYCIN MONOHYDRATE 500 MG: 250 TABLET ORAL at 15:26

## 2022-06-01 RX ADMIN — IPRATROPIUM BROMIDE AND ALBUTEROL SULFATE 3 ML: 2.5; .5 SOLUTION RESPIRATORY (INHALATION) at 12:54

## 2022-06-01 RX ADMIN — AZELASTINE HYDROCHLORIDE 1 SPRAY: 137 SPRAY, METERED NASAL at 21:22

## 2022-06-01 RX ADMIN — ARFORMOTEROL TARTRATE 15 MCG: 15 SOLUTION RESPIRATORY (INHALATION) at 07:54

## 2022-06-01 RX ADMIN — IPRATROPIUM BROMIDE AND ALBUTEROL SULFATE 3 ML: 2.5; .5 SOLUTION RESPIRATORY (INHALATION) at 07:54

## 2022-06-01 RX ADMIN — HEPARIN SODIUM 5000 UNITS: 5000 INJECTION, SOLUTION INTRAVENOUS; SUBCUTANEOUS at 21:21

## 2022-06-01 RX ADMIN — QUETIAPINE FUMARATE 100 MG: 25 TABLET ORAL at 01:27

## 2022-06-01 RX ADMIN — ATORVASTATIN CALCIUM 20 MG: 20 TABLET, FILM COATED ORAL at 01:29

## 2022-06-01 RX ADMIN — HYDROCODONE POLISTIREX AND CHLORPHENIRAMINE POLISTIREX 2.5 ML: 10; 8 SUSPENSION, EXTENDED RELEASE ORAL at 17:44

## 2022-06-01 RX ADMIN — HEPARIN SODIUM 5000 UNITS: 5000 INJECTION, SOLUTION INTRAVENOUS; SUBCUTANEOUS at 07:33

## 2022-06-01 RX ADMIN — SENNOSIDES AND DOCUSATE SODIUM 2 TABLET: 50; 8.6 TABLET ORAL at 09:07

## 2022-06-01 RX ADMIN — IBUPROFEN 400 MG: 400 TABLET, FILM COATED ORAL at 21:45

## 2022-06-01 RX ADMIN — CETIRIZINE HYDROCHLORIDE 10 MG: 10 TABLET, FILM COATED ORAL at 09:07

## 2022-06-01 RX ADMIN — Medication 10 ML: at 00:30

## 2022-06-02 LAB
BACTERIA SPEC RESP CULT: NORMAL
GRAM STN SPEC: NORMAL

## 2022-06-02 PROCEDURE — 94664 DEMO&/EVAL PT USE INHALER: CPT

## 2022-06-02 PROCEDURE — 25010000002 HEPARIN (PORCINE) PER 1000 UNITS: Performed by: STUDENT IN AN ORGANIZED HEALTH CARE EDUCATION/TRAINING PROGRAM

## 2022-06-02 PROCEDURE — 99232 SBSQ HOSP IP/OBS MODERATE 35: CPT | Performed by: INTERNAL MEDICINE

## 2022-06-02 PROCEDURE — 99233 SBSQ HOSP IP/OBS HIGH 50: CPT | Performed by: INTERNAL MEDICINE

## 2022-06-02 PROCEDURE — 94799 UNLISTED PULMONARY SVC/PX: CPT

## 2022-06-02 PROCEDURE — 63710000001 PREDNISONE PER 1 MG: Performed by: NURSE PRACTITIONER

## 2022-06-02 PROCEDURE — 94640 AIRWAY INHALATION TREATMENT: CPT

## 2022-06-02 RX ADMIN — HEPARIN SODIUM 5000 UNITS: 5000 INJECTION, SOLUTION INTRAVENOUS; SUBCUTANEOUS at 14:33

## 2022-06-02 RX ADMIN — QUETIAPINE FUMARATE 100 MG: 25 TABLET ORAL at 08:17

## 2022-06-02 RX ADMIN — IPRATROPIUM BROMIDE AND ALBUTEROL SULFATE 3 ML: 2.5; .5 SOLUTION RESPIRATORY (INHALATION) at 12:11

## 2022-06-02 RX ADMIN — ARFORMOTEROL TARTRATE 15 MCG: 15 SOLUTION RESPIRATORY (INHALATION) at 19:45

## 2022-06-02 RX ADMIN — ATORVASTATIN CALCIUM 20 MG: 20 TABLET, FILM COATED ORAL at 20:33

## 2022-06-02 RX ADMIN — CHLORDIAZEPOXIDE HYDROCHLORIDE 25 MG: 25 CAPSULE ORAL at 14:46

## 2022-06-02 RX ADMIN — MONTELUKAST 10 MG: 10 TABLET, FILM COATED ORAL at 20:33

## 2022-06-02 RX ADMIN — AZELASTINE HYDROCHLORIDE 1 SPRAY: 137 SPRAY, METERED NASAL at 20:33

## 2022-06-02 RX ADMIN — BUDESONIDE 0.5 MG: 0.5 SUSPENSION RESPIRATORY (INHALATION) at 06:42

## 2022-06-02 RX ADMIN — PREDNISONE 40 MG: 20 TABLET ORAL at 08:17

## 2022-06-02 RX ADMIN — AZITHROMYCIN MONOHYDRATE 500 MG: 250 TABLET ORAL at 09:15

## 2022-06-02 RX ADMIN — IPRATROPIUM BROMIDE AND ALBUTEROL SULFATE 3 ML: 2.5; .5 SOLUTION RESPIRATORY (INHALATION) at 06:42

## 2022-06-02 RX ADMIN — SENNOSIDES AND DOCUSATE SODIUM 2 TABLET: 50; 8.6 TABLET ORAL at 20:33

## 2022-06-02 RX ADMIN — FUROSEMIDE 20 MG: 20 TABLET ORAL at 08:17

## 2022-06-02 RX ADMIN — ROFLUMILAST 500 MCG: 500 TABLET ORAL at 08:17

## 2022-06-02 RX ADMIN — ARFORMOTEROL TARTRATE 15 MCG: 15 SOLUTION RESPIRATORY (INHALATION) at 06:42

## 2022-06-02 RX ADMIN — AZELASTINE HYDROCHLORIDE 1 SPRAY: 137 SPRAY, METERED NASAL at 08:18

## 2022-06-02 RX ADMIN — Medication 10 ML: at 08:18

## 2022-06-02 RX ADMIN — BUDESONIDE 0.5 MG: 0.5 SUSPENSION RESPIRATORY (INHALATION) at 19:45

## 2022-06-02 RX ADMIN — HEPARIN SODIUM 5000 UNITS: 5000 INJECTION, SOLUTION INTRAVENOUS; SUBCUTANEOUS at 20:32

## 2022-06-02 RX ADMIN — SENNOSIDES AND DOCUSATE SODIUM 2 TABLET: 50; 8.6 TABLET ORAL at 08:17

## 2022-06-02 RX ADMIN — HEPARIN SODIUM 5000 UNITS: 5000 INJECTION, SOLUTION INTRAVENOUS; SUBCUTANEOUS at 08:18

## 2022-06-02 RX ADMIN — HYDROCODONE POLISTIREX AND CHLORPHENIRAMINE POLISTIREX 2.5 ML: 10; 8 SUSPENSION, EXTENDED RELEASE ORAL at 14:45

## 2022-06-02 RX ADMIN — CYCLOBENZAPRINE 10 MG: 10 TABLET, FILM COATED ORAL at 15:33

## 2022-06-02 RX ADMIN — ASPIRIN 81 MG: 81 TABLET, COATED ORAL at 08:18

## 2022-06-02 RX ADMIN — IBUPROFEN 400 MG: 400 TABLET, FILM COATED ORAL at 20:34

## 2022-06-02 RX ADMIN — QUETIAPINE FUMARATE 100 MG: 25 TABLET ORAL at 20:32

## 2022-06-02 RX ADMIN — CETIRIZINE HYDROCHLORIDE 10 MG: 10 TABLET, FILM COATED ORAL at 08:17

## 2022-06-02 RX ADMIN — IPRATROPIUM BROMIDE AND ALBUTEROL SULFATE 3 ML: 2.5; .5 SOLUTION RESPIRATORY (INHALATION) at 00:16

## 2022-06-02 RX ADMIN — IPRATROPIUM BROMIDE AND ALBUTEROL SULFATE 3 ML: 2.5; .5 SOLUTION RESPIRATORY (INHALATION) at 19:45

## 2022-06-02 RX ADMIN — Medication 10 ML: at 20:33

## 2022-06-03 ENCOUNTER — READMISSION MANAGEMENT (OUTPATIENT)
Dept: CALL CENTER | Facility: HOSPITAL | Age: 62
End: 2022-06-03

## 2022-06-03 VITALS
WEIGHT: 193.56 LBS | OXYGEN SATURATION: 96 % | RESPIRATION RATE: 20 BRPM | HEART RATE: 106 BPM | BODY MASS INDEX: 33.05 KG/M2 | TEMPERATURE: 97.6 F | SYSTOLIC BLOOD PRESSURE: 131 MMHG | HEIGHT: 64 IN | DIASTOLIC BLOOD PRESSURE: 56 MMHG

## 2022-06-03 LAB
CHLAMYDIA IGG SER-ACNC: <0.91 RATIO (ref 0–0.9)
HADV AB TITR SER CF: NEGATIVE {TITER}
L PNEUMO AB SER IA-ACNC: <0.91 OD RATIO (ref 0–0.9)
M PNEUMO IGG SER IA-ACNC: 822 U/ML (ref 0–99)
M PNEUMO IGM SER IA-ACNC: <770 U/ML (ref 0–769)

## 2022-06-03 PROCEDURE — 63710000001 PREDNISONE PER 1 MG: Performed by: NURSE PRACTITIONER

## 2022-06-03 PROCEDURE — 94799 UNLISTED PULMONARY SVC/PX: CPT

## 2022-06-03 PROCEDURE — 94664 DEMO&/EVAL PT USE INHALER: CPT

## 2022-06-03 PROCEDURE — 25010000002 HEPARIN (PORCINE) PER 1000 UNITS: Performed by: STUDENT IN AN ORGANIZED HEALTH CARE EDUCATION/TRAINING PROGRAM

## 2022-06-03 PROCEDURE — 99238 HOSP IP/OBS DSCHRG MGMT 30/<: CPT | Performed by: INTERNAL MEDICINE

## 2022-06-03 RX ORDER — ALBUTEROL SULFATE 90 UG/1
2 AEROSOL, METERED RESPIRATORY (INHALATION) EVERY 4 HOURS PRN
Start: 2022-06-03 | End: 2023-02-06 | Stop reason: SDUPTHER

## 2022-06-03 RX ORDER — PREDNISONE 20 MG/1
40 TABLET ORAL
Qty: 4 TABLET | Refills: 0 | Status: SHIPPED | OUTPATIENT
Start: 2022-06-04 | End: 2022-06-06

## 2022-06-03 RX ORDER — AZITHROMYCIN 500 MG/1
TABLET, FILM COATED ORAL
Qty: 3 TABLET | Refills: 0 | Status: SHIPPED | OUTPATIENT
Start: 2022-06-03 | End: 2022-06-13

## 2022-06-03 RX ORDER — IPRATROPIUM BROMIDE AND ALBUTEROL SULFATE 2.5; .5 MG/3ML; MG/3ML
3 SOLUTION RESPIRATORY (INHALATION) 4 TIMES DAILY
Qty: 360 ML | Refills: 3
Start: 2022-06-03

## 2022-06-03 RX ADMIN — ROFLUMILAST 500 MCG: 500 TABLET ORAL at 08:19

## 2022-06-03 RX ADMIN — Medication 10 ML: at 08:19

## 2022-06-03 RX ADMIN — BUDESONIDE 0.5 MG: 0.5 SUSPENSION RESPIRATORY (INHALATION) at 08:02

## 2022-06-03 RX ADMIN — AZELASTINE HYDROCHLORIDE 1 SPRAY: 137 SPRAY, METERED NASAL at 08:20

## 2022-06-03 RX ADMIN — ARFORMOTEROL TARTRATE 15 MCG: 15 SOLUTION RESPIRATORY (INHALATION) at 08:02

## 2022-06-03 RX ADMIN — IPRATROPIUM BROMIDE AND ALBUTEROL SULFATE 3 ML: 2.5; .5 SOLUTION RESPIRATORY (INHALATION) at 00:28

## 2022-06-03 RX ADMIN — CHLORDIAZEPOXIDE HYDROCHLORIDE 25 MG: 25 CAPSULE ORAL at 08:19

## 2022-06-03 RX ADMIN — CETIRIZINE HYDROCHLORIDE 10 MG: 10 TABLET, FILM COATED ORAL at 08:19

## 2022-06-03 RX ADMIN — CYCLOBENZAPRINE 10 MG: 10 TABLET, FILM COATED ORAL at 08:19

## 2022-06-03 RX ADMIN — PREDNISONE 40 MG: 20 TABLET ORAL at 08:19

## 2022-06-03 RX ADMIN — AZITHROMYCIN MONOHYDRATE 500 MG: 250 TABLET ORAL at 09:20

## 2022-06-03 RX ADMIN — FUROSEMIDE 20 MG: 20 TABLET ORAL at 08:19

## 2022-06-03 RX ADMIN — IPRATROPIUM BROMIDE AND ALBUTEROL SULFATE 3 ML: 2.5; .5 SOLUTION RESPIRATORY (INHALATION) at 08:02

## 2022-06-03 RX ADMIN — IPRATROPIUM BROMIDE AND ALBUTEROL SULFATE 3 ML: 2.5; .5 SOLUTION RESPIRATORY (INHALATION) at 12:01

## 2022-06-03 RX ADMIN — HEPARIN SODIUM 5000 UNITS: 5000 INJECTION, SOLUTION INTRAVENOUS; SUBCUTANEOUS at 06:14

## 2022-06-03 RX ADMIN — QUETIAPINE FUMARATE 100 MG: 25 TABLET ORAL at 08:19

## 2022-06-03 RX ADMIN — ASPIRIN 81 MG: 81 TABLET, COATED ORAL at 08:19

## 2022-06-04 LAB
BACTERIA SPEC AEROBE CULT: NORMAL
BACTERIA SPEC AEROBE CULT: NORMAL

## 2022-06-04 NOTE — OUTREACH NOTE
Prep Survey    Flowsheet Row Responses   [facility] facility patient discharged from? [facility]   Is LACE score < 7 ? No   Emergency Room discharge w/ pulse ox? No   Eligibility [facility]   Hospital [facility]   Date of Admission 05/30/22   Date of Discharge 06/03/22   Discharge Disposition Home or Self Care   Discharge diagnosis A/C hypoxic resp failure, sepsis, PNA, Acute COPD exacerbation   Does the patient have one of the following disease processes/diagnoses(primary or secondary)? Sepsis   Does the patient have Home health ordered? No   Is there a DME ordered? No   Prep survey completed? Yes          OLIVA BOOTH - Registered Nurse

## 2022-06-06 ENCOUNTER — TRANSITIONAL CARE MANAGEMENT TELEPHONE ENCOUNTER (OUTPATIENT)
Dept: CALL CENTER | Facility: HOSPITAL | Age: 62
End: 2022-06-06

## 2022-06-06 NOTE — OUTREACH NOTE
Call Center TCM Note    Flowsheet Row Responses   [facility] patient discharged from? [facility]   Does the patient have one of the following disease processes/diagnoses(primary or secondary)? Sepsis   TCM attempt successful? No   Unsuccessful attempts Attempt 2  [spouse picked up the second time but hung up without answering]   Call Status Left message  [on pt's number]          CHARISSE CARDENAS RN    6/6/2022, 15:29 EDT

## 2022-06-06 NOTE — OUTREACH NOTE
Call Center TCM Note    Flowsheet Row Responses   [facility] [place] facility patient discharged from? [facility]   Does the patient have one of the following disease processes/diagnoses(primary or secondary)? Sepsis   TCM attempt successful? No   Unsuccessful attempts Attempt 1  [both numbers were answered but both just hung up without saying hello]          CHARISSE CARDENAS RN    6/6/2022, 08:45 EDT

## 2022-06-07 ENCOUNTER — TELEPHONE (OUTPATIENT)
Dept: FAMILY MEDICINE CLINIC | Facility: CLINIC | Age: 62
End: 2022-06-07

## 2022-06-07 ENCOUNTER — TRANSITIONAL CARE MANAGEMENT TELEPHONE ENCOUNTER (OUTPATIENT)
Dept: CALL CENTER | Facility: HOSPITAL | Age: 62
End: 2022-06-07

## 2022-06-07 NOTE — OUTREACH NOTE
Call Center TCM Note    Flowsheet Row Responses   [facility] patient discharged from? [facility]   Does the patient have one of the following disease processes/diagnoses(primary or secondary)? Sepsis   TCM attempt successful? Yes   Call start time 0817   Call end time 0835   Discharge diagnosis A/C hypoxic resp failure, Acute COPD exacerbation   Person spoke with today (if not patient) and relationship patient   Meds reviewed with patient/caregiver? Yes   Does the patient have all medications related to this admission filled (includes all antibiotics, inhalers, nebulizers,steroids,etc.) Yes   Is the patient taking all medications as directed (includes completed medication regime)? Yes   Comments regarding appointments F/U with Dr Molina one month,  appt with Vicky Montes APRN 7/20/22  230pm   Does the patient have a primary care provider?  Yes   Comments regarding PCP Fouzia West, DO PCP. Hospital follow up scheduled for Monday 6/13  8am   Does the patient have an appointment with their PCP within 7 days of discharge? Greater than 7 days   Nursing Interventions Verified appointment date/time/provider   Has the patient kept scheduled appointments due by today? N/A   Has home health visited the patient within 72 hours of discharge? N/A   DME comments Has home O2, nebulizer, pulse ox to monitor sats.    Psychosocial issues? No   Did the patient receive a copy of their discharge instructions? Yes   Nursing interventions Reviewed instructions with patient   What is the patient's perception of their health status since discharge? Same   Nursing interventions Nurse provided patient education   Is the patient/caregiver able to teach back Sepsis? S - Shivering,fever or very cold, S - Short of breath   Nursing interventions Nurse provided reassurance to patient, Nurse provided patient education   Is patient/caregiver able to teach back steps to recovery at home? Set small, achievable goals for return to baseline health,  Rest and regain strength   Is the patient/caregiver able to teach back signs and symptoms of worsening condition: Shortness of breath/rapid respiratory rate, Fever   If the patient is a current smoker, are they able to teach back resources for cessation? Not a smoker   Is the patient/caregiver able to teach back the hierarchy of who to call/visit for symptoms/problems? PCP, Specialist, Home health nurse, Urgent Care, ED, 911 Yes   TCM call completed? Yes   Wrap up additional comments Patient verbalizes planning to keep HFU appt on Monday 6/13. She wants to discuss usual medication treatments that help with her COPD exacerbations that keep her out of the hospital. She doesn't feel like the meds ordered at discharge are enough of a course of treatment for her.           Su Solano RN    6/7/2022, 08:35 EDT

## 2022-06-07 NOTE — TELEPHONE ENCOUNTER
Caller: Callie Basilio    Relationship: Self    Best call back number: [phone]    Who are you requesting to speak with (clinical staff, provider,  specific staff member): PROVIDER    What was the call regarding: PATIENT STATES SHE WOULD LIKE A CALL FROM DR ORTA ABOUT SOME APPOINTMENTS SHE HAS AND HER RECENT HOSPITAL VISIT. PATIENT STATES SHE WAS EXPECTING A CALL FROM DR ORTA ON 6/6/2022 BUT DIDN'T RECEIVE ONE.     Do you require a callback: YES

## 2022-06-09 ENCOUNTER — TELEPHONE (OUTPATIENT)
Dept: FAMILY MEDICINE CLINIC | Facility: CLINIC | Age: 62
End: 2022-06-09

## 2022-06-09 NOTE — TELEPHONE ENCOUNTER
Patient has 3 different letters from her insurance company regarding charges for certain things from her hospital stay and psychiatric help/medication  I referred patient to the billing dept for the hospital stay to see if they could straighten out her charges. Patient has appointment on 06/13 with Dr. West. Let her know to bring letters with her and discuss with provider

## 2022-06-13 ENCOUNTER — LAB (OUTPATIENT)
Dept: LAB | Facility: HOSPITAL | Age: 62
End: 2022-06-13

## 2022-06-13 ENCOUNTER — TELEPHONE (OUTPATIENT)
Dept: FAMILY MEDICINE CLINIC | Facility: CLINIC | Age: 62
End: 2022-06-13

## 2022-06-13 ENCOUNTER — TELEPHONE (OUTPATIENT)
Dept: SURGERY | Facility: CLINIC | Age: 62
End: 2022-06-13

## 2022-06-13 ENCOUNTER — OFFICE VISIT (OUTPATIENT)
Dept: FAMILY MEDICINE CLINIC | Facility: CLINIC | Age: 62
End: 2022-06-13

## 2022-06-13 VITALS
RESPIRATION RATE: 16 BRPM | WEIGHT: 201.1 LBS | SYSTOLIC BLOOD PRESSURE: 95 MMHG | TEMPERATURE: 97.9 F | OXYGEN SATURATION: 94 % | HEART RATE: 96 BPM | HEIGHT: 64 IN | BODY MASS INDEX: 34.33 KG/M2 | DIASTOLIC BLOOD PRESSURE: 42 MMHG

## 2022-06-13 DIAGNOSIS — F39 MOOD DISORDER: Chronic | ICD-10-CM

## 2022-06-13 DIAGNOSIS — Z09 HOSPITAL DISCHARGE FOLLOW-UP: Primary | ICD-10-CM

## 2022-06-13 DIAGNOSIS — D64.9 ANEMIA, UNSPECIFIED TYPE: ICD-10-CM

## 2022-06-13 DIAGNOSIS — I10 PRIMARY HYPERTENSION: Chronic | ICD-10-CM

## 2022-06-13 DIAGNOSIS — E66.09 CLASS 1 OBESITY DUE TO EXCESS CALORIES WITHOUT SERIOUS COMORBIDITY WITH BODY MASS INDEX (BMI) OF 34.0 TO 34.9 IN ADULT: Chronic | ICD-10-CM

## 2022-06-13 DIAGNOSIS — J44.9 COPD, SEVERE: Chronic | ICD-10-CM

## 2022-06-13 DIAGNOSIS — Z09 HOSPITAL DISCHARGE FOLLOW-UP: ICD-10-CM

## 2022-06-13 DIAGNOSIS — J96.11 CHRONIC RESPIRATORY FAILURE WITH HYPOXIA: Chronic | ICD-10-CM

## 2022-06-13 PROBLEM — J44.1 COPD EXACERBATION: Chronic | Status: ACTIVE | Noted: 2022-05-31

## 2022-06-13 PROBLEM — E66.811 CLASS 1 OBESITY DUE TO EXCESS CALORIES WITHOUT SERIOUS COMORBIDITY WITH BODY MASS INDEX (BMI) OF 33.0 TO 33.9 IN ADULT: Chronic | Status: RESOLVED | Noted: 2021-09-23 | Resolved: 2022-06-13

## 2022-06-13 PROBLEM — J96.10 CHRONIC RESPIRATORY FAILURE: Chronic | Status: RESOLVED | Noted: 2022-05-31 | Resolved: 2022-06-13

## 2022-06-13 PROBLEM — J44.1 COPD EXACERBATION: Chronic | Status: RESOLVED | Noted: 2022-05-31 | Resolved: 2022-06-13

## 2022-06-13 PROBLEM — J96.10 CHRONIC RESPIRATORY FAILURE: Chronic | Status: ACTIVE | Noted: 2022-05-31

## 2022-06-13 PROBLEM — E66.9 OBESITY (BMI 30-39.9): Status: RESOLVED | Noted: 2022-05-31 | Resolved: 2022-06-13

## 2022-06-13 LAB
ALBUMIN SERPL-MCNC: 3.6 G/DL (ref 3.5–5.2)
ALBUMIN/GLOB SERPL: 1.4 G/DL
ALP SERPL-CCNC: 77 U/L (ref 39–117)
ALT SERPL W P-5'-P-CCNC: 20 U/L (ref 1–33)
ANION GAP SERPL CALCULATED.3IONS-SCNC: 14.5 MMOL/L (ref 5–15)
AST SERPL-CCNC: 18 U/L (ref 1–32)
BASOPHILS # BLD AUTO: 0.06 10*3/MM3 (ref 0–0.2)
BASOPHILS NFR BLD AUTO: 0.9 % (ref 0–1.5)
BILIRUB SERPL-MCNC: 0.3 MG/DL (ref 0–1.2)
BUN SERPL-MCNC: 12 MG/DL (ref 8–23)
BUN/CREAT SERPL: 14.1 (ref 7–25)
CALCIUM SPEC-SCNC: 8.9 MG/DL (ref 8.6–10.5)
CHLORIDE SERPL-SCNC: 105 MMOL/L (ref 98–107)
CO2 SERPL-SCNC: 22.5 MMOL/L (ref 22–29)
CREAT SERPL-MCNC: 0.85 MG/DL (ref 0.57–1)
DEPRECATED RDW RBC AUTO: 52.6 FL (ref 37–54)
EGFRCR SERPLBLD CKD-EPI 2021: 77.6 ML/MIN/1.73
EOSINOPHIL # BLD AUTO: 0.09 10*3/MM3 (ref 0–0.4)
EOSINOPHIL NFR BLD AUTO: 1.3 % (ref 0.3–6.2)
ERYTHROCYTE [DISTWIDTH] IN BLOOD BY AUTOMATED COUNT: 14.2 % (ref 12.3–15.4)
GLOBULIN UR ELPH-MCNC: 2.6 GM/DL
GLUCOSE SERPL-MCNC: 101 MG/DL (ref 65–99)
HCT VFR BLD AUTO: 36.4 % (ref 34–46.6)
HGB BLD-MCNC: 11.6 G/DL (ref 12–15.9)
IMM GRANULOCYTES # BLD AUTO: 0.04 10*3/MM3 (ref 0–0.05)
IMM GRANULOCYTES NFR BLD AUTO: 0.6 % (ref 0–0.5)
LYMPHOCYTES # BLD AUTO: 1.87 10*3/MM3 (ref 0.7–3.1)
LYMPHOCYTES NFR BLD AUTO: 27.3 % (ref 19.6–45.3)
MCH RBC QN AUTO: 32.4 PG (ref 26.6–33)
MCHC RBC AUTO-ENTMCNC: 31.9 G/DL (ref 31.5–35.7)
MCV RBC AUTO: 101.7 FL (ref 79–97)
MONOCYTES # BLD AUTO: 0.52 10*3/MM3 (ref 0.1–0.9)
MONOCYTES NFR BLD AUTO: 7.6 % (ref 5–12)
NEUTROPHILS NFR BLD AUTO: 4.28 10*3/MM3 (ref 1.7–7)
NEUTROPHILS NFR BLD AUTO: 62.3 % (ref 42.7–76)
NRBC BLD AUTO-RTO: 0 /100 WBC (ref 0–0.2)
PLATELET # BLD AUTO: 195 10*3/MM3 (ref 140–450)
PMV BLD AUTO: 11.2 FL (ref 6–12)
POTASSIUM SERPL-SCNC: 3.8 MMOL/L (ref 3.5–5.2)
PROT SERPL-MCNC: 6.2 G/DL (ref 6–8.5)
RBC # BLD AUTO: 3.58 10*6/MM3 (ref 3.77–5.28)
SODIUM SERPL-SCNC: 142 MMOL/L (ref 136–145)
WBC NRBC COR # BLD: 6.86 10*3/MM3 (ref 3.4–10.8)

## 2022-06-13 PROCEDURE — 80053 COMPREHEN METABOLIC PANEL: CPT

## 2022-06-13 PROCEDURE — 85025 COMPLETE CBC W/AUTO DIFF WBC: CPT

## 2022-06-13 PROCEDURE — 99495 TRANSJ CARE MGMT MOD F2F 14D: CPT | Performed by: FAMILY MEDICINE

## 2022-06-13 PROCEDURE — 36415 COLL VENOUS BLD VENIPUNCTURE: CPT

## 2022-06-13 NOTE — ASSESSMENT & PLAN NOTE
Psychological condition is unchanged.  Referral to psychiatry.  Psychological condition  will be reassessed at the next regular appointment.

## 2022-06-13 NOTE — ASSESSMENT & PLAN NOTE
Patient's (Body mass index is 34.5 kg/m².) indicates that they are obese (BMI >30) with health conditions that include hypertension . Weight is worsening. BMI is is above average; BMI management plan is completed. We discussed low calorie, low carb based diet program, portion control and increasing exercise.

## 2022-06-13 NOTE — TELEPHONE ENCOUNTER
DARRIUS FROM [facility] CALLED AND STATED THAT PATIENT CANCELLED THEIR HERNIA APPOINTMENT THEY STATED THAT THE PATIENT WANTED TO WAIT FOR THIS APPOINTMENT

## 2022-06-13 NOTE — PROGRESS NOTES
"Chief Complaint  Hospital Follow Up Visit, Fatigue, mood disorder, and needs rx for o2 monitor     Subjective        Callie Basilio presents to [facility]  She is here today for a hospital follow-up.  She has anxiety, depression, COPD, chronic hypoxic respiratory failure and as smoking history.  She sleeps with oxygen and uses it occasionally when she is feeling short of breath.     The patient has increased her Latuda to 40 mg and her mood is stable.       She is back to her baseline breathing now after hospital DC. She is on 3 liters 02 at night and prn during the day.      The patient has no other complaints today and denies chest pain, shortness of breath, weakness, numbness, nausea, vomiting, diarrhea, dizziness or syncopal event.      Objective   Vital Signs:  BP 95/42 (BP Location: Left arm, Patient Position: Sitting)   Pulse 96   Temp 97.9 °F (36.6 °C)   Resp 16   Ht 162.6 cm (64.02\")   Wt 91.2 kg (201 lb 1.6 oz)   SpO2 94%   BMI 34.50 kg/m²   Estimated body mass index is 34.5 kg/m² as calculated from the following:    Height as of this encounter: 162.6 cm (64.02\").    Weight as of this encounter: 91.2 kg (201 lb 1.6 oz).          Physical Exam  Vitals reviewed.   Constitutional:       Appearance: Normal appearance. She is well-developed. She is obese.   HENT:      Head: Normocephalic and atraumatic.      Right Ear: External ear normal.      Left Ear: External ear normal.      Mouth/Throat:      Pharynx: No oropharyngeal exudate.   Eyes:      Conjunctiva/sclera: Conjunctivae normal.      Pupils: Pupils are equal, round, and reactive to light.   Neck:      Vascular: No carotid bruit.   Cardiovascular:      Rate and Rhythm: Normal rate and regular rhythm.      Heart sounds: No murmur heard.    No friction rub. No gallop.   Pulmonary:      Effort: Pulmonary effort is normal.      Breath sounds: Normal breath sounds. No wheezing or rhonchi.   Abdominal:      General: " There is no distension.   Skin:     General: Skin is warm and dry.   Neurological:      Mental Status: She is alert and oriented to person, place, and time.      Cranial Nerves: No cranial nerve deficit.      Motor: No weakness.   Psychiatric:         Mood and Affect: Mood and affect normal.         Behavior: Behavior normal.         Thought Content: Thought content normal.         Judgment: Judgment normal.        Result Review :    CMP    CMP 5/30/22 5/31/22 5/31/22 6/1/22 0108 0443    Glucose 140 (A) 219 (A) 193 (A) 229 (A)   BUN 16 15 14 16   Creatinine 0.92 0.85 0.78 0.71   Sodium 139 138 135 (A) 137   Potassium 3.9 4.4 4.5 4.4   Chloride 99 103 102 102   Calcium 9.7 9.4 9.2 9.0   Albumin 3.80   3.20 (A)   Total Bilirubin 0.7   <0.2   Alkaline Phosphatase 131 (A)   110   AST (SGOT) 10   9   ALT (SGPT) 15   18   (A) Abnormal value       Comments are available for some flowsheets but are not being displayed.           CBC    CBC 5/30/22 5/31/22 5/31/22 6/1/22     0108 0443    WBC 17.87 (A) 17.24 (A) 15.93 (A) 13.76 (A)   RBC 3.90 3.91 3.87 3.53 (A)   Hemoglobin 13.0 12.8 12.7 11.4 (A)   Hematocrit 40.6 40.9 41.0 36.6   .1 (A) 104.6 (A) 105.9 (A) 103.7 (A)   MCH 33.3 (A) 32.7 32.8 32.3   MCHC 32.0 31.3 (A) 31.0 (A) 31.1 (A)   RDW 15.5 (A) 15.3 15.4 15.2   Platelets 207 208 209 221   (A) Abnormal value            Lipid Panel    Lipid Panel 9/23/21   Total Cholesterol 172   Triglycerides 103   HDL Cholesterol 57   VLDL Cholesterol 19   LDL Cholesterol  96   LDL/HDL Ratio 1.66           TSH    TSH 9/23/21   TSH 0.765                     Assessment and Plan   Diagnoses and all orders for this visit:    1. Hospital discharge follow-up (Primary)  Comments:  She is breathing back at her baseline. She is on 3 liters 02 at night and occasionally during the day when she is SOB.     2. Chronic respiratory failure with hypoxia (HCC)  Assessment & Plan:  Her breathing is back to baseline today. She is using her  inhalers as prescribed.       3. COPD, severe (HCC)  Assessment & Plan:  COPD is improving with treatment.  Continue current medications.          4. Mood disorder (HCC)  Assessment & Plan:  Psychological condition is unchanged.  Referral to psychiatry.  Psychological condition  will be reassessed at the next regular appointment.    Orders:  -     Ambulatory Referral to Psychiatry    5. Primary hypertension  Assessment & Plan:  Hypertension is improving with treatment.  Continue current treatment regimen.  Dietary sodium restriction.  Weight loss.  Blood pressure will be reassessed at the next regular appointment.      6. Class 1 obesity due to excess calories without serious comorbidity with body mass index (BMI) of 34.0 to 34.9 in adult  Assessment & Plan:  Patient's (Body mass index is 34.5 kg/m².) indicates that they are obese (BMI >30) with health conditions that include hypertension . Weight is worsening. BMI is is above average; BMI management plan is completed. We discussed low calorie, low carb based diet program, portion control and increasing exercise.              Follow Up   No follow-ups on file.  Patient was given instructions and counseling regarding her condition or for health maintenance advice. Please see specific information pulled into the AVS if appropriate.

## 2022-06-14 ENCOUNTER — READMISSION MANAGEMENT (OUTPATIENT)
Dept: CALL CENTER | Facility: HOSPITAL | Age: 62
End: 2022-06-14

## 2022-06-14 NOTE — OUTREACH NOTE
Sepsis Week 2 Survey    Flowsheet Row Responses   [facility] patient discharged from? [facility]   Does the patient have one of the following disease processes/diagnoses(primary or secondary)? Sepsis   Week 2 attempt successful? Yes   Call start time 1242   Call end time 1245   Discharge diagnosis A/C hypoxic resp failure, Acute COPD exacerbation   Person spoke with today (if not patient) and relationship patient   Meds reviewed with patient/caregiver? Yes   Is the patient taking all medications as directed (includes completed medication regime)? Yes   Comments regarding appointments F/U with Dr Molina one month,  appt with Vicky Montes APRN 7/20/22  230pm   Comments regarding PCP Patient had appt yesterday with PCP.    Has the patient kept scheduled appointments due by today? Yes   Has home health visited the patient within 72 hours of discharge? N/A   DME comments Has home O2, nebulizer, pulse ox to monitor sats.    Psychosocial issues? No   Did the patient receive a copy of their discharge instructions? Yes   What is the patient's perception of their health status since discharge? Improving   If the patient is a current smoker, are they able to teach back resources for cessation? Not a smoker   Is the patient/caregiver able to teach back the hierarchy of who to call/visit for symptoms/problems? PCP, Specialist, Home health nurse, Urgent Care, ED, 911 Yes   Week 2 call completed? Yes   Wrap up additional comments Brief call with patient has she was at the pharmacy at time of call.           GRACIELA PILLAI - Registered Nurse

## 2022-06-21 ENCOUNTER — READMISSION MANAGEMENT (OUTPATIENT)
Dept: CALL CENTER | Facility: HOSPITAL | Age: 62
End: 2022-06-21

## 2022-06-21 NOTE — OUTREACH NOTE
Sepsis Week 3 Survey    Flowsheet Row Responses   [facility]   Does the patient have one of the following disease processes/diagnoses(primary or secondary)? Sepsis   Week 3 attempt successful? No   Unsuccessful attempts Attempt 1          GRACIELA PILLAI - Registered Nurse

## 2022-06-23 ENCOUNTER — READMISSION MANAGEMENT (OUTPATIENT)
Dept: CALL CENTER | Facility: HOSPITAL | Age: 62
End: 2022-06-23

## 2022-06-23 NOTE — OUTREACH NOTE
Sepsis Week 3 Survey    Flowsheet Row Responses   [facility] facility patient discharged from? [facility]   Does the patient have one of the following disease processes/diagnoses(primary or secondary)? Sepsis   Week 3 attempt successful? No  [Reached spouse who defers to patient for updates. No answer at patient's number.]   Call start time 1205   Unsuccessful attempts Attempt 2          DAVID CLEMONS - Registered Nurse

## 2022-06-27 RX ORDER — BECLOMETHASONE DIPROPIONATE 80 UG/1
AEROSOL, METERED NASAL
Qty: 10.6 G | Refills: 4 | Status: SHIPPED | OUTPATIENT
Start: 2022-06-27

## 2022-06-27 RX ORDER — ROFLUMILAST 500 UG/1
TABLET ORAL
Qty: 90 TABLET | Refills: 1 | Status: SHIPPED | OUTPATIENT
Start: 2022-06-27 | End: 2023-01-24

## 2022-07-19 RX ORDER — MONTELUKAST SODIUM 10 MG/1
TABLET ORAL
Qty: 90 TABLET | Refills: 0 | Status: SHIPPED | OUTPATIENT
Start: 2022-07-19 | End: 2022-10-17

## 2022-07-22 ENCOUNTER — TELEPHONE (OUTPATIENT)
Dept: FAMILY MEDICINE CLINIC | Facility: CLINIC | Age: 62
End: 2022-07-22
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
## 2022-08-16 ENCOUNTER — OFFICE VISIT (OUTPATIENT)
Dept: FAMILY MEDICINE CLINIC | Facility: CLINIC | Age: 62
End: 2022-08-16

## 2022-08-16 VITALS
HEIGHT: 64 IN | SYSTOLIC BLOOD PRESSURE: 130 MMHG | RESPIRATION RATE: 18 BRPM | OXYGEN SATURATION: 97 % | TEMPERATURE: 97.9 F | DIASTOLIC BLOOD PRESSURE: 75 MMHG | HEART RATE: 99 BPM | BODY MASS INDEX: 33.84 KG/M2 | WEIGHT: 198.2 LBS

## 2022-08-16 DIAGNOSIS — I10 PRIMARY HYPERTENSION: Chronic | ICD-10-CM

## 2022-08-16 DIAGNOSIS — E66.09 CLASS 1 OBESITY DUE TO EXCESS CALORIES WITHOUT SERIOUS COMORBIDITY WITH BODY MASS INDEX (BMI) OF 34.0 TO 34.9 IN ADULT: Chronic | ICD-10-CM

## 2022-08-16 DIAGNOSIS — M48.061 SPINAL STENOSIS OF LUMBAR REGION, UNSPECIFIED WHETHER NEUROGENIC CLAUDICATION PRESENT: ICD-10-CM

## 2022-08-16 DIAGNOSIS — M54.42 ACUTE LEFT-SIDED LOW BACK PAIN WITH LEFT-SIDED SCIATICA: Primary | ICD-10-CM

## 2022-08-16 DIAGNOSIS — M51.36 BULGING LUMBAR DISC: ICD-10-CM

## 2022-08-16 PROBLEM — Z09 HOSPITAL DISCHARGE FOLLOW-UP: Status: RESOLVED | Noted: 2022-06-13 | Resolved: 2022-08-16

## 2022-08-16 PROCEDURE — 96372 THER/PROPH/DIAG INJ SC/IM: CPT | Performed by: FAMILY MEDICINE

## 2022-08-16 PROCEDURE — 99214 OFFICE O/P EST MOD 30 MIN: CPT | Performed by: FAMILY MEDICINE

## 2022-08-16 RX ORDER — KETOROLAC TROMETHAMINE 30 MG/ML
60 INJECTION, SOLUTION INTRAMUSCULAR; INTRAVENOUS ONCE
Status: COMPLETED | OUTPATIENT
Start: 2022-08-16 | End: 2022-08-16

## 2022-08-16 RX ORDER — METHYLPREDNISOLONE 4 MG/1
TABLET ORAL
Qty: 1 EACH | Refills: 0 | Status: SHIPPED | OUTPATIENT
Start: 2022-08-16 | End: 2022-08-30

## 2022-08-16 RX ORDER — QUETIAPINE FUMARATE 50 MG/1
TABLET, EXTENDED RELEASE ORAL
COMMUNITY
Start: 2022-08-12

## 2022-08-16 RX ORDER — QUETIAPINE 300 MG/1
TABLET, FILM COATED, EXTENDED RELEASE ORAL
COMMUNITY
Start: 2022-08-08

## 2022-08-16 RX ORDER — KETOROLAC TROMETHAMINE 30 MG/ML
60 INJECTION, SOLUTION INTRAMUSCULAR; INTRAVENOUS ONCE
Status: DISCONTINUED | OUTPATIENT
Start: 2022-08-16 | End: 2022-08-16

## 2022-08-16 RX ADMIN — KETOROLAC TROMETHAMINE 60 MG: 30 INJECTION, SOLUTION INTRAMUSCULAR; INTRAVENOUS at 16:50
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
## 2022-08-17 ENCOUNTER — HOSPITAL ENCOUNTER (OUTPATIENT)
Dept: MRI IMAGING | Facility: HOSPITAL | Age: 62
Discharge: HOME | End: 2022-08-17
Admitting: FAMILY MEDICINE

## 2022-08-17 DIAGNOSIS — M54.42 ACUTE LEFT-SIDED LOW BACK PAIN WITH LEFT-SIDED SCIATICA: ICD-10-CM

## 2022-08-17 PROCEDURE — 72148 MRI LUMBAR SPINE W/O DYE: CPT

## 2022-08-19 ENCOUNTER — CLINICAL SUPPORT (OUTPATIENT)
Dept: FAMILY MEDICINE CLINIC | Facility: CLINIC | Age: 62
End: 2022-08-19

## 2022-08-19 DIAGNOSIS — R09.81 NASAL CONGESTION: ICD-10-CM

## 2022-08-19 DIAGNOSIS — R05.9 COUGH: Primary | ICD-10-CM

## 2022-08-19 LAB
EXPIRATION DATE: ABNORMAL
INTERNAL CONTROL: ABNORMAL
Lab: ABNORMAL
SARS-COV-2 AG UPPER RESP QL IA.RAPID: DETECTED

## 2022-08-19 PROCEDURE — 87426 SARSCOV CORONAVIRUS AG IA: CPT | Performed by: FAMILY MEDICINE

## 2022-08-25 ENCOUNTER — TELEPHONE (OUTPATIENT)
Dept: FAMILY MEDICINE CLINIC | Facility: CLINIC | Age: 62
End: 2022-08-25

## 2022-08-25 NOTE — ASSESSMENT & PLAN NOTE
Patient's (Body mass index is 34 kg/m².) indicates that they are obese (BMI >30) with health conditions that include hypertension . Weight is improving with lifestyle modifications. BMI is is above average; BMI management plan is completed. We discussed low calorie, low carb based diet program, portion control and increasing exercise.

## 2022-08-30 ENCOUNTER — OFFICE VISIT (OUTPATIENT)
Dept: FAMILY MEDICINE CLINIC | Facility: CLINIC | Age: 62
End: 2022-08-30

## 2022-08-30 VITALS
OXYGEN SATURATION: 96 % | TEMPERATURE: 97.2 F | RESPIRATION RATE: 16 BRPM | HEART RATE: 84 BPM | WEIGHT: 195.7 LBS | BODY MASS INDEX: 33.41 KG/M2 | SYSTOLIC BLOOD PRESSURE: 142 MMHG | DIASTOLIC BLOOD PRESSURE: 59 MMHG | HEIGHT: 64 IN

## 2022-08-30 DIAGNOSIS — I10 PRIMARY HYPERTENSION: Chronic | ICD-10-CM

## 2022-08-30 DIAGNOSIS — E53.8 B12 DEFICIENCY: ICD-10-CM

## 2022-08-30 DIAGNOSIS — U07.1 COVID-19 VIRUS INFECTION: Primary | ICD-10-CM

## 2022-08-30 DIAGNOSIS — E66.09 CLASS 1 OBESITY DUE TO EXCESS CALORIES WITH SERIOUS COMORBIDITY AND BODY MASS INDEX (BMI) OF 33.0 TO 33.9 IN ADULT: Chronic | ICD-10-CM

## 2022-08-30 DIAGNOSIS — E53.8 VITAMIN B12 DEFICIENCY: ICD-10-CM

## 2022-08-30 PROBLEM — E66.811 CLASS 1 OBESITY DUE TO EXCESS CALORIES WITHOUT SERIOUS COMORBIDITY WITH BODY MASS INDEX (BMI) OF 34.0 TO 34.9 IN ADULT: Chronic | Status: RESOLVED | Noted: 2021-09-23 | Resolved: 2022-08-30

## 2022-08-30 LAB
EXPIRATION DATE: NORMAL
INTERNAL CONTROL: NORMAL
Lab: NORMAL
SARS-COV-2 AG UPPER RESP QL IA.RAPID: NOT DETECTED

## 2022-08-30 PROCEDURE — 96372 THER/PROPH/DIAG INJ SC/IM: CPT | Performed by: FAMILY MEDICINE

## 2022-08-30 PROCEDURE — 87426 SARSCOV CORONAVIRUS AG IA: CPT | Performed by: FAMILY MEDICINE

## 2022-08-30 PROCEDURE — 99214 OFFICE O/P EST MOD 30 MIN: CPT | Performed by: FAMILY MEDICINE

## 2022-08-30 RX ORDER — FLUCONAZOLE 100 MG/1
100 TABLET ORAL DAILY
COMMUNITY
End: 2022-12-23 | Stop reason: DRUGHIGH

## 2022-08-30 RX ORDER — CYCLOBENZAPRINE HCL 10 MG
10 TABLET ORAL 3 TIMES DAILY PRN
Qty: 90 TABLET | Refills: 2 | Status: SHIPPED | OUTPATIENT
Start: 2022-08-30 | End: 2023-02-06 | Stop reason: SDUPTHER

## 2022-08-30 RX ORDER — CLONAZEPAM 0.5 MG/1
0.5 TABLET ORAL 2 TIMES DAILY
COMMUNITY
Start: 2022-08-24

## 2022-08-30 RX ORDER — UBIDECARENONE 75 MG
50 CAPSULE ORAL DAILY
COMMUNITY
End: 2023-02-06 | Stop reason: SDUPTHER

## 2022-08-30 RX ORDER — CYANOCOBALAMIN 1000 UG/ML
1000 INJECTION, SOLUTION INTRAMUSCULAR; SUBCUTANEOUS
Status: SHIPPED | OUTPATIENT
Start: 2022-08-30

## 2022-08-30 RX ADMIN — CYANOCOBALAMIN 1000 MCG: 1000 INJECTION, SOLUTION INTRAMUSCULAR; SUBCUTANEOUS at 16:54

## 2022-08-30 NOTE — ASSESSMENT & PLAN NOTE
Patient's (Body mass index is 33.58 kg/m².) indicates that they are obese (BMI >30) with health conditions that include dyslipidemias . Weight is improving with lifestyle modifications. BMI is is above average; BMI management plan is completed. We discussed low calorie, low carb based diet program, portion control and increasing exercise.

## 2022-08-30 NOTE — ASSESSMENT & PLAN NOTE
She was given a shot today of B12. We will continue to monitor her B12 level and do monthly injections.

## 2022-08-30 NOTE — PROGRESS NOTES
"Chief Complaint  Cough (Coughing up thick and sometimes yellow mucus ), Results (MRI ), and Medication Problem (Would like to change oral B-12 to injections )    Subjective        Callie Basilio presents to [facility]  She is here today for follow-up for her chronic medical conditions and for follow-up for recent Covid infection.  She has anxiety, depression, COPD, chronic hypoxic respiratory failure and as smoking history.  She sleeps with oxygen and uses it occasionally when she is feeling short of breath.     She is being treated by mental health for her psychiatric conditions.    She is 10 days post diagnosis. She says that Paxlovid really helped her.       She has lost 6 lbs since her last visit.     The patient has no other complaints today and denies chest pain, shortness of breath, weakness, numbness, nausea, vomiting, diarrhea, dizziness or syncopal event.         Objective   Vital Signs:  /59 (BP Location: Left arm, Patient Position: Sitting)   Pulse 84   Temp 97.2 °F (36.2 °C)   Resp 16   Ht 162.6 cm (64.02\")   Wt 88.8 kg (195 lb 11.2 oz)   SpO2 96%   BMI 33.58 kg/m²   Estimated body mass index is 33.58 kg/m² as calculated from the following:    Height as of this encounter: 162.6 cm (64.02\").    Weight as of this encounter: 88.8 kg (195 lb 11.2 oz).          Physical Exam  Vitals reviewed.   Constitutional:       Appearance: Normal appearance. She is well-developed. She is obese.   HENT:      Head: Normocephalic and atraumatic.      Right Ear: External ear normal.      Left Ear: External ear normal.      Mouth/Throat:      Pharynx: No oropharyngeal exudate.   Eyes:      Conjunctiva/sclera: Conjunctivae normal.      Pupils: Pupils are equal, round, and reactive to light.   Neck:      Vascular: No carotid bruit.   Cardiovascular:      Rate and Rhythm: Normal rate and regular rhythm.      Heart sounds: No murmur heard.    No friction rub. No gallop. "   Pulmonary:      Effort: Pulmonary effort is normal.      Breath sounds: Normal breath sounds. No wheezing or rhonchi.   Abdominal:      General: There is no distension.   Skin:     General: Skin is warm and dry.   Neurological:      Mental Status: She is alert and oriented to person, place, and time.      Cranial Nerves: No cranial nerve deficit.      Motor: No weakness.   Psychiatric:         Mood and Affect: Mood and affect normal.         Behavior: Behavior normal.         Thought Content: Thought content normal.         Judgment: Judgment normal.        Result Review :    CMP    CMP 5/31/22 5/31/22 6/1/22 6/13/22    0108 0443     Glucose 219 (A) 193 (A) 229 (A) 101 (A)   BUN 15 14 16 12   Creatinine 0.85 0.78 0.71 0.85   Sodium 138 135 (A) 137 142   Potassium 4.4 4.5 4.4 3.8   Chloride 103 102 102 105   Calcium 9.4 9.2 9.0 8.9   Albumin   3.20 (A) 3.60   Total Bilirubin   <0.2 0.3   Alkaline Phosphatase   110 77   AST (SGOT)   9 18   ALT (SGPT)   18 20   (A) Abnormal value       Comments are available for some flowsheets but are not being displayed.           CBC    CBC 5/31/22 5/31/22 6/1/22 6/13/22    0108 0443     WBC 17.24 (A) 15.93 (A) 13.76 (A) 6.86   RBC 3.91 3.87 3.53 (A) 3.58 (A)   Hemoglobin 12.8 12.7 11.4 (A) 11.6 (A)   Hematocrit 40.9 41.0 36.6 36.4   .6 (A) 105.9 (A) 103.7 (A) 101.7 (A)   MCH 32.7 32.8 32.3 32.4   MCHC 31.3 (A) 31.0 (A) 31.1 (A) 31.9   RDW 15.3 15.4 15.2 14.2   Platelets 208 209 221 195   (A) Abnormal value            Lipid Panel    Lipid Panel 9/23/21   Total Cholesterol 172   Triglycerides 103   HDL Cholesterol 57   VLDL Cholesterol 19   LDL Cholesterol  96   LDL/HDL Ratio 1.66           TSH    TSH 9/23/21   TSH 0.765                     Assessment and Plan   Diagnoses and all orders for this visit:    1. COVID-19 virus infection (Primary)  Comments:  She is doing well today. Her symptoms are resolving.   Orders:  -     POCT SARS-CoV-2 Antigen EDWARD    2. B12  deficiency  Assessment & Plan:  She was given a shot today of B12. We will continue to monitor her B12 level and do monthly injections.       3. Class 1 obesity due to excess calories with serious comorbidity and body mass index (BMI) of 33.0 to 33.9 in adult  Assessment & Plan:  Patient's (Body mass index is 33.58 kg/m².) indicates that they are obese (BMI >30) with health conditions that include dyslipidemias . Weight is improving with lifestyle modifications. BMI is is above average; BMI management plan is completed. We discussed low calorie, low carb based diet program, portion control and increasing exercise.       4. Primary hypertension  Assessment & Plan:  Hypertension is improving with treatment.  Continue current treatment regimen.  Dietary sodium restriction.  Weight loss.  Blood pressure will be reassessed at the next regular appointment.      Other orders  -     cyclobenzaprine (FLEXERIL) 10 MG tablet; Take 1 tablet by mouth 3 (Three) Times a Day As Needed for Muscle Spasms.  Dispense: 90 tablet; Refill: 2           Follow Up   No follow-ups on file.  Patient was given instructions and counseling regarding her condition or for health maintenance advice. Please see specific information pulled into the AVS if appropriate.

## 2022-09-01 ENCOUNTER — OFFICE VISIT (OUTPATIENT)
Dept: NEUROSURGERY | Facility: CLINIC | Age: 62
End: 2022-09-01

## 2022-09-01 VITALS
SYSTOLIC BLOOD PRESSURE: 125 MMHG | HEIGHT: 64 IN | DIASTOLIC BLOOD PRESSURE: 81 MMHG | BODY MASS INDEX: 33.05 KG/M2 | WEIGHT: 193.6 LBS

## 2022-09-01 DIAGNOSIS — M51.36 DEGENERATIVE DISC DISEASE, LUMBAR: Primary | ICD-10-CM

## 2022-09-01 DIAGNOSIS — M70.61 TROCHANTERIC BURSITIS OF RIGHT HIP: ICD-10-CM

## 2022-09-01 DIAGNOSIS — M54.16 LUMBAR RADICULOPATHY: ICD-10-CM
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
## 2022-09-01 PROCEDURE — 99203 OFFICE O/P NEW LOW 30 MIN: CPT | Performed by: NURSE PRACTITIONER
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
## 2022-10-17 RX ORDER — MONTELUKAST SODIUM 10 MG/1
TABLET ORAL
Qty: 90 TABLET | Refills: 0 | Status: SHIPPED | OUTPATIENT
Start: 2022-10-17 | End: 2022-12-21 | Stop reason: SDUPTHER

## 2022-10-17 RX ORDER — LOSARTAN POTASSIUM 25 MG/1
TABLET ORAL
Qty: 90 TABLET | Refills: 2 | Status: SHIPPED | OUTPATIENT
Start: 2022-10-17 | End: 2022-12-21 | Stop reason: SDUPTHER

## 2022-10-17 RX ORDER — FLUCONAZOLE 150 MG/1
TABLET ORAL
Qty: 20 TABLET | Refills: 0 | Status: SHIPPED | OUTPATIENT
Start: 2022-10-17 | End: 2022-12-21 | Stop reason: SDUPTHER

## 2022-11-15 RX ORDER — DICLOFENAC SODIUM 75 MG/1
TABLET, DELAYED RELEASE ORAL
Qty: 180 TABLET | Refills: 0 | Status: SHIPPED | OUTPATIENT
Start: 2022-11-15 | End: 2022-11-17 | Stop reason: SDUPTHER

## 2022-11-17 ENCOUNTER — OFFICE VISIT (OUTPATIENT)
Dept: FAMILY MEDICINE CLINIC | Facility: CLINIC | Age: 62
End: 2022-11-17

## 2022-11-17 VITALS
HEART RATE: 96 BPM | WEIGHT: 196.4 LBS | OXYGEN SATURATION: 98 % | TEMPERATURE: 97.6 F | HEIGHT: 64 IN | DIASTOLIC BLOOD PRESSURE: 72 MMHG | SYSTOLIC BLOOD PRESSURE: 136 MMHG | BODY MASS INDEX: 33.53 KG/M2

## 2022-11-17 DIAGNOSIS — M54.41 ACUTE RIGHT-SIDED LOW BACK PAIN WITH RIGHT-SIDED SCIATICA: ICD-10-CM

## 2022-11-17 DIAGNOSIS — Z00.00 MEDICARE ANNUAL WELLNESS VISIT, SUBSEQUENT: Primary | ICD-10-CM

## 2022-11-17 DIAGNOSIS — Z00.00 ANNUAL PHYSICAL EXAM: ICD-10-CM

## 2022-11-17 DIAGNOSIS — E66.09 CLASS 1 OBESITY DUE TO EXCESS CALORIES WITH SERIOUS COMORBIDITY AND BODY MASS INDEX (BMI) OF 33.0 TO 33.9 IN ADULT: Chronic | ICD-10-CM

## 2022-11-17 DIAGNOSIS — I10 PRIMARY HYPERTENSION: Chronic | ICD-10-CM

## 2022-11-17 DIAGNOSIS — E78.2 MIXED HYPERLIPIDEMIA: Chronic | ICD-10-CM

## 2022-11-17 PROCEDURE — G0439 PPPS, SUBSEQ VISIT: HCPCS | Performed by: FAMILY MEDICINE

## 2022-11-17 PROCEDURE — 96372 THER/PROPH/DIAG INJ SC/IM: CPT | Performed by: FAMILY MEDICINE

## 2022-11-17 PROCEDURE — 1125F AMNT PAIN NOTED PAIN PRSNT: CPT | Performed by: FAMILY MEDICINE

## 2022-11-17 PROCEDURE — 99214 OFFICE O/P EST MOD 30 MIN: CPT | Performed by: FAMILY MEDICINE

## 2022-11-17 PROCEDURE — 96160 PT-FOCUSED HLTH RISK ASSMT: CPT | Performed by: FAMILY MEDICINE

## 2022-11-17 PROCEDURE — 1170F FXNL STATUS ASSESSED: CPT | Performed by: FAMILY MEDICINE

## 2022-11-17 PROCEDURE — 1159F MED LIST DOCD IN RCRD: CPT | Performed by: FAMILY MEDICINE

## 2022-11-17 RX ORDER — DICLOFENAC SODIUM 75 MG/1
75 TABLET, DELAYED RELEASE ORAL 2 TIMES DAILY
Qty: 180 TABLET | Refills: 1 | Status: SHIPPED | OUTPATIENT
Start: 2022-11-17 | End: 2023-02-06 | Stop reason: SDUPTHER

## 2022-11-17 RX ORDER — DICLOFENAC SODIUM 30 MG/G
3.2 GEL TOPICAL
Qty: 100 G | Refills: 5 | Status: SHIPPED | OUTPATIENT
Start: 2022-11-17

## 2022-11-17 RX ORDER — KETOROLAC TROMETHAMINE 30 MG/ML
60 INJECTION, SOLUTION INTRAMUSCULAR; INTRAVENOUS ONCE
Status: DISCONTINUED | OUTPATIENT
Start: 2022-11-17 | End: 2022-11-18

## 2022-11-17 RX ADMIN — KETOROLAC TROMETHAMINE 60 MG: 30 INJECTION, SOLUTION INTRAMUSCULAR; INTRAVENOUS at 16:00

## 2022-11-17 NOTE — ASSESSMENT & PLAN NOTE
Hypertension is improving with treatment.  Continue current treatment regimen.  Dietary sodium restriction.  Weight loss.  Blood pressure will be reassessed at the next regular appointment.   [New Patient Visit] : a new patient visit [Referred By: _________] : Patient was referred by TROY [FreeTextEntry1] : The patient reported having low mid back pain with spasm into the buttocks which is worse on the right side.

## 2022-11-17 NOTE — ASSESSMENT & PLAN NOTE
Patient's (Body mass index is 33.69 kg/m².) indicates that they are obese (BMI >30) with health conditions that include HTN and HLD. Weight is unchanged. BMI is is above average; BMI management plan is completed. We discussed low calorie, low carb based diet program, portion control and increasing exercise.

## 2022-11-17 NOTE — PROGRESS NOTES
"Chief Complaint  ANNUAL WELLNESS VISIT, Med Refill, and Injections (TORADOL 60 INJ,LAST INJ WAS 8/16/2022)    Subjective    {Problem List  Visit Diagnosis   Encounters  Notes  Medications  Labs  Result Review Imaging  Media :23}    Callie Basilio presents to [facility]  History of Present Illness    Objective   Vital Signs:  /72 (BP Location: Left arm, Patient Position: Sitting, Cuff Size: Adult)   Pulse 96   Temp 97.6 °F (36.4 °C) (Temporal)   Ht 162.6 cm (64.02\")   Wt 89.1 kg (196 lb 6.4 oz)   SpO2 98%   BMI 33.69 kg/m²   Estimated body mass index is 33.69 kg/m² as calculated from the following:    Height as of this encounter: 162.6 cm (64.02\").    Weight as of this encounter: 89.1 kg (196 lb 6.4 oz).    {BMI is >= 30 and <35. (Class 1 Obesity). The following options were offered after discussion; (Optional):69563}      Physical Exam   Result Review :{Labs  Result Review  Imaging  Med Tab  Media  Procedures  :23}  {The following data was reviewed by (Optional):55094}  {Ambulatory Labs (Optional):42445}  {Data reviewed (Optional):46475:::1}          Assessment and Plan {CC Problem List  Visit Diagnosis   ROS  Review (Popup)  Health Maintenance  Quality  BestPractice  Medications  SmartSets  SnapShot Encounters  Media :23}  There are no diagnoses linked to this encounter.       {Time Spent (Optional):85651}  Follow Up {Instructions Charge Capture  Follow-up Communications :23}  No follow-ups on file.  Patient was given instructions and counseling regarding her condition or for health maintenance advice. Please see specific information pulled into the AVS if appropriate.       "

## 2022-11-17 NOTE — PROGRESS NOTES
The ABCs of the Annual Wellness Visit  Subsequent Medicare Wellness Visit    Chief Complaint   Patient presents with   • ANNUAL WELLNESS VISIT   • Med Refill   • Injections     TORADOL 60 INJ,LAST INJ WAS 8/16/2022      Subjective    History of Present Illness:  Callie Basilio is a 62 y.o. female who presents for a Subsequent Medicare Wellness Visit.    The following portions of the patient's history were reviewed and   updated as appropriate: allergies, current medications, past family history, past medical history, past social history, past surgical history and problem list.    Compared to one year ago, the patient feels her physical   health is better.    Compared to one year ago, the patient feels her mental   health is better.    Recent Hospitalizations:  This patient has had a [facility] admission record on file within the last 365 days.    Current Medical Providers:  Patient Care Team:  Fouzai West DO as PCP - General (Family Medicine)    Outpatient Medications Prior to Visit   Medication Sig Dispense Refill   • albuterol (ACCUNEB) 1.25 MG/3ML nebulizer solution Take 1 ampule by nebulization Every 4 (Four) Hours As Needed.     • albuterol sulfate  (90 Base) MCG/ACT inhaler Inhale 2 puffs Every 4 (Four) Hours As Needed for Wheezing or Shortness of Air.     • alendronate (FOSAMAX) 70 MG tablet Take 70 mg by mouth Every 7 (Seven) Days.     • aspirin 81 MG EC tablet Take 81 mg by mouth Daily.     • atorvastatin (LIPITOR) 20 MG tablet Take 20 mg by mouth Every Night.     • Breztri Aerosphere 160-9-4.8 MCG/ACT aerosol inhaler Inhale 2 puffs 2 (Two) Times a Day. 10.7 g 5   • budesonide (PULMICORT) 0.5 MG/2ML nebulizer solution Take 0.5 mg by nebulization Daily.     • cetirizine (zyrTEC) 10 MG tablet Take 10 mg by mouth Daily.     • clonazePAM (KlonoPIN) 0.5 MG tablet Take 0.5 mg by mouth 2 (Two) Times a Day.     • cyclobenzaprine (FLEXERIL) 10 MG tablet Take 1 tablet by mouth 3 (Three) Times a Day  As Needed for Muscle Spasms. 90 tablet 2   • Daliresp 500 MCG tablet tablet TAKE 1 TABLET BY MOUTH ONCE DAILY 90 tablet 1   • fluconazole (DIFLUCAN) 150 MG tablet TAKE ONE TABLET BY MOUTH FOR ONE DOSE 20 tablet 0   • fluticasone (FLONASE) 50 MCG/ACT nasal spray 1 spray by Each Nare route 2 (Two) Times a Day.     • furosemide (LASIX) 40 MG tablet Take 0.5 tablets by mouth 2 (Two) Times a Day. 270 tablet 0   • HYDROcod Polst-CPM Polst ER (TUSSIONEX PENNKINETIC) 10-8 MG/5ML ER suspension TAKE 1 TEASPOONFUL BY MOUTH TWICE DAILY AS NEEDED (Patient taking differently: Take 5 mL by mouth Every 12 (Twelve) Hours As Needed.) 120 mL 0   • HYDROcodone-acetaminophen (NORCO) 7.5-325 MG per tablet TAKE 1 TABLET BY MOUTH EVERY SIX HOURS AS NEEDED FOR PAIN (MAY CAUSE DROWSINESS) (Patient taking differently: Take 1 tablet by mouth Every 4 (Four) Hours As Needed.) 120 tablet 0   • ipratropium-albuterol (DUO-NEB) 0.5-2.5 mg/3 ml nebulizer Take 3 mL by nebulization 4 (Four) Times a Day. 360 mL 3   • losartan (COZAAR) 25 MG tablet TAKE 1 TABLET BY MOUTH ONCE DAILY 90 tablet 2   • magnesium oxide (MAG-OX) 400 MG tablet Take 1 tablet by mouth Daily. 30 tablet 5   • montelukast (SINGULAIR) 10 MG tablet TAKE 1 TABLET BY MOUTH EVERY EVENING 90 tablet 0   • promethazine (PHENERGAN) 25 MG tablet Take 25 mg by mouth Every 6 (Six) Hours As Needed for Nausea or Vomiting.     • Qnasl 80 MCG/ACT aerosol solution USE ONE SPRAY INTO NOSTRIL(S) AS DIRECTED BY PROVIDER 10.6 g 4   • QUEtiapine fumarate ER (SEROquel XR) 50 MG tablet sustained-release 24 hour tablet      • QUEtiapine XR (SEROquel XR) 300 MG 24 hr tablet      • vitamin B-12 (CYANOCOBALAMIN) 100 MCG tablet Take 50 mcg by mouth Daily.     • vitamin D (ERGOCALCIFEROL) 1.25 MG (23047 UT) capsule capsule Take 50,000 Units by mouth 1 (One) Time Per Week.     • diclofenac (VOLTAREN) 75 MG EC tablet TAKE 1 TABLET BY MOUTH TWICE DAILY (TAKE WITH FOOD) 180 tablet 0   • Diclofenac Sodium (VOLTAREN) 3  % gel gel Apply 3.2 g topically to the appropriate area as directed 5 (Five) Times a Day As Needed. for 60 days.     • fluconazole (DIFLUCAN) 100 MG tablet Take 100 mg by mouth Daily. Prn     • nystatin (MYCOSTATIN) 100,000 unit/mL suspension Swish and swallow 5 mL Daily As Needed (trush). 473 mL 1     Facility-Administered Medications Prior to Visit   Medication Dose Route Frequency Provider Last Rate Last Admin   • cyanocobalamin injection 1,000 mcg  1,000 mcg Intramuscular Q28 Days Fouzia West,    1,000 mcg at 08/30/22 1654       Opioid medication/s are on active medication list.  and I have evaluated her active treatment plan and pain score trends (see table).  There were no vitals filed for this visit.  I have reviewed the chart for potential of high risk medication and harmful drug interactions in the elderly.            Aspirin is on active medication list. Aspirin use is indicated based on review of current medical condition/s. Pros and cons of this therapy have been discussed today. Benefits of this medication outweigh potential harm.  Patient has been encouraged to continue taking this medication.  .      Patient Active Problem List   Diagnosis   • Kidney stone   • COPD, severe (HCC)   • IBS (irritable bowel syndrome)   • Allergic rhinitis   • Anxiety and depression   • Arthritis   • Asthma   • Diverticulosis   • Other osteoporosis without current pathological fracture   • Chronic respiratory failure with hypoxia (HCC)   • Class 1 obesity due to excess calories with serious comorbidity and body mass index (BMI) of 33.0 to 33.9 in adult   • Mixed hyperlipidemia   • Thrush, oral   • Primary hypertension   • Mood disorder (HCC)   • B12 deficiency   • Medicare annual wellness visit, subsequent   • Acute right-sided low back pain with right-sided sciatica     Advance Care Planning  Advance Directive is not on file.  ACP discussion was held with the patient during this visit. Patient does not have an advance  "directive, information provided.    Review of Systems   HENT: Negative for trouble swallowing.    Eyes: Negative for visual disturbance.   Respiratory: Negative for apnea.    Cardiovascular: Negative for chest pain.   Gastrointestinal: Negative for blood in stool.   Endocrine: Negative for polyphagia.   Genitourinary: Negative for dysuria.   Musculoskeletal: Positive for arthralgias and back pain.   Skin: Negative for color change.   Allergic/Immunologic: Negative for immunocompromised state.   Neurological: Negative for seizures.   Hematological: Negative for adenopathy.   Psychiatric/Behavioral: Negative for behavioral problems.        Objective    Vitals:    11/17/22 1416   BP: 136/72   BP Location: Left arm   Patient Position: Sitting   Cuff Size: Adult   Pulse: 96   Temp: 97.6 °F (36.4 °C)   TempSrc: Temporal   SpO2: 98%   Weight: 89.1 kg (196 lb 6.4 oz)   Height: 162.6 cm (64.02\")     Estimated body mass index is 33.69 kg/m² as calculated from the following:    Height as of this encounter: 162.6 cm (64.02\").    Weight as of this encounter: 89.1 kg (196 lb 6.4 oz).    BMI is >= 30 and <35. (Class 1 Obesity). The following options were offered after discussion;: nutrition counseling/recommendations      Does the patient have evidence of cognitive impairment? No    Physical Exam  Vitals reviewed.   Constitutional:       Appearance: Normal appearance. She is well-developed.   HENT:      Head: Normocephalic and atraumatic.      Right Ear: External ear normal.      Left Ear: External ear normal.      Ears:      Comments: Wisper test: normal hearing via wisper test.      Mouth/Throat:      Pharynx: No oropharyngeal exudate.   Eyes:      General: Lids are normal.         Right eye: No foreign body.         Left eye: No foreign body.      Extraocular Movements:      Right eye: Normal extraocular motion and no nystagmus.      Left eye: Normal extraocular motion and no nystagmus.      Conjunctiva/sclera: Conjunctivae " normal.      Right eye: Right conjunctiva is not injected. No hemorrhage.     Left eye: Left conjunctiva is not injected. No hemorrhage.     Pupils: Pupils are equal, round, and reactive to light.      Comments: Vision:   OD: 20/30  OS: 20/25   Neck:      Vascular: No carotid bruit.   Cardiovascular:      Rate and Rhythm: Normal rate and regular rhythm.      Pulses:           Dorsalis pedis pulses are 2+ on the right side and 2+ on the left side.        Posterior tibial pulses are 2+ on the right side and 2+ on the left side.      Heart sounds: No murmur heard.    No friction rub. No gallop.   Pulmonary:      Effort: Pulmonary effort is normal.      Breath sounds: Normal breath sounds. No wheezing or rhonchi.   Abdominal:      General: There is no distension.   Feet:      Right foot:      Protective Sensation: 3 sites tested. 3 sites sensed.      Skin integrity: Skin integrity normal.      Toenail Condition: Right toenails are normal.      Left foot:      Protective Sensation: 3 sites tested. 3 sites sensed.      Skin integrity: Skin integrity normal.      Toenail Condition: Left toenails are normal.   Skin:     General: Skin is warm and dry.   Neurological:      Mental Status: She is alert and oriented to person, place, and time.      Cranial Nerves: No cranial nerve deficit.      Motor: No weakness.   Psychiatric:         Mood and Affect: Mood and affect normal.         Behavior: Behavior normal.         Thought Content: Thought content normal.         Judgment: Judgment normal.                 HEALTH RISK ASSESSMENT    Smoking Status:  Social History     Tobacco Use   Smoking Status Former   • Packs/day: 1.00   • Types: Cigarettes   Smokeless Tobacco Never   Tobacco Comments    no second hand smoke exposure. Has not somked for over 2 weeks     Alcohol Consumption:  Social History     Substance and Sexual Activity   Alcohol Use Never     Fall Risk Screen:    STEADI Fall Risk Assessment was completed, and patient  is at LOW risk for falls.Assessment completed on:11/17/2022    Depression Screening:  PHQ-2/PHQ-9 Depression Screening 11/17/2022   Retired PHQ-9 Total Score -   Retired Total Score -   Little Interest or Pleasure in Doing Things 0-->not at all   Feeling Down, Depressed or Hopeless 1-->several days   PHQ-9: Brief Depression Severity Measure Score 1       Health Habits and Functional and Cognitive Screening:  Functional & Cognitive Status 11/17/2022   Do you have difficulty preparing food and eating? No   Do you have difficulty bathing yourself, getting dressed or grooming yourself? No   Do you have difficulty using the toilet? No   Do you have difficulty moving around from place to place? No   Do you have trouble with steps or getting out of a bed or a chair? No   Current Diet Well Balanced Diet   Dental Exam Up to date   Eye Exam Not up to date   Exercise (times per week) 5 times per week   Current Exercises Include Walking;House Cleaning   Do you need help using the phone?  No   Are you deaf or do you have serious difficulty hearing?  No   Do you need help with transportation? No   Do you need help shopping? No   Do you need help preparing meals?  No   Do you need help with housework?  No   Do you need help with laundry? No   Do you need help taking your medications? No   Do you need help managing money? No   Do you ever drive or ride in a car without wearing a seat belt? No   Have you felt unusual stress, anger or loneliness in the last month? No   Who do you live with? Spouse   If you need help, do you have trouble finding someone available to you? No   Have you been bothered in the last four weeks by sexual problems? No   Do you have difficulty concentrating, remembering or making decisions? No       Age-appropriate Screening Schedule:  Refer to the list below for future screening recommendations based on patient's age, sex and/or medical conditions. Orders for these recommended tests are listed in the plan  section. The patient has been provided with a written plan.    Health Maintenance   Topic Date Due   • TDAP/TD VACCINES (1 - Tdap) Never done   • ZOSTER VACCINE (1 of 2) Never done   • LIPID PANEL  09/23/2022   • MAMMOGRAM  02/05/2023   • DXA SCAN  05/18/2024   • INFLUENZA VACCINE  Completed              Assessment & Plan   CMS Preventative Services Quick Reference  Risk Factors Identified During Encounter  Obesity/Overweight   The above risks/problems have been discussed with the patient.  Follow up actions/plans if indicated are seen below in the Assessment/Plan Section.  Pertinent information has been shared with the patient in the After Visit Summary.    Diagnoses and all orders for this visit:    1. Medicare annual wellness visit, subsequent (Primary)    2. Acute right-sided low back pain with right-sided sciatica  Assessment & Plan:  She was given a tramadol injection today. She will hopefully see improvement in her symptoms.     Orders:  -     ketorolac (TORADOL) injection 60 mg    3. Class 1 obesity due to excess calories with serious comorbidity and body mass index (BMI) of 33.0 to 33.9 in adult  Assessment & Plan:  Patient's (Body mass index is 33.69 kg/m².) indicates that they are obese (BMI >30) with health conditions that include HTN and HLD. Weight is unchanged. BMI is is above average; BMI management plan is completed. We discussed low calorie, low carb based diet program, portion control and increasing exercise.       4. Mixed hyperlipidemia  -     Lipid panel; Future    5. Annual physical exam  -     Comprehensive metabolic panel; Future  -     CBC & Differential; Future  -     TSH; Future    6. Primary hypertension  Assessment & Plan:  Hypertension is improving with treatment.  Continue current treatment regimen.  Dietary sodium restriction.  Weight loss.  Blood pressure will be reassessed at the next regular appointment.      Other orders  -     diclofenac (VOLTAREN) 75 MG EC tablet; Take 1 tablet  by mouth 2 (Two) Times a Day.  Dispense: 180 tablet; Refill: 1  -     Diclofenac Sodium 3 % gel gel; Apply 3.2 g topically to the appropriate area as directed 5 (Five) Times a Day As Needed (PAIN). for 60 days.  Dispense: 100 g; Refill: 5      Follow Up:   Return in about 6 months (around 5/17/2023).     An After Visit Summary and PPPS were made available to the patient.

## 2022-11-18 RX ORDER — KETOROLAC TROMETHAMINE 30 MG/ML
60 INJECTION, SOLUTION INTRAMUSCULAR; INTRAVENOUS ONCE
Status: COMPLETED | OUTPATIENT
Start: 2022-11-17 | End: 2022-11-17

## 2022-11-29 ENCOUNTER — OFFICE VISIT (OUTPATIENT)
Dept: FAMILY MEDICINE CLINIC | Facility: CLINIC | Age: 62
End: 2022-11-29

## 2022-11-29 VITALS
SYSTOLIC BLOOD PRESSURE: 103 MMHG | HEART RATE: 115 BPM | HEIGHT: 64 IN | BODY MASS INDEX: 33.48 KG/M2 | DIASTOLIC BLOOD PRESSURE: 42 MMHG | TEMPERATURE: 97.4 F | RESPIRATION RATE: 16 BRPM | WEIGHT: 196.1 LBS

## 2022-11-29 DIAGNOSIS — J06.9 BACTERIAL UPPER RESPIRATORY INFECTION: Primary | ICD-10-CM

## 2022-11-29 DIAGNOSIS — R05.9 COUGH, UNSPECIFIED TYPE: ICD-10-CM

## 2022-11-29 DIAGNOSIS — I10 PRIMARY HYPERTENSION: Chronic | ICD-10-CM

## 2022-11-29 DIAGNOSIS — B96.89 BACTERIAL UPPER RESPIRATORY INFECTION: Primary | ICD-10-CM

## 2022-11-29 DIAGNOSIS — E66.09 CLASS 1 OBESITY DUE TO EXCESS CALORIES WITH SERIOUS COMORBIDITY AND BODY MASS INDEX (BMI) OF 33.0 TO 33.9 IN ADULT: Chronic | ICD-10-CM

## 2022-11-29 PROBLEM — M54.41 ACUTE RIGHT-SIDED LOW BACK PAIN WITH RIGHT-SIDED SCIATICA: Status: RESOLVED | Noted: 2022-11-17 | Resolved: 2022-11-29

## 2022-11-29 LAB
EXPIRATION DATE: NORMAL
EXPIRATION DATE: NORMAL
FLUAV AG NPH QL: NEGATIVE
FLUBV AG NPH QL: NEGATIVE
INTERNAL CONTROL: NORMAL
INTERNAL CONTROL: NORMAL
Lab: NORMAL
Lab: NORMAL
SARS-COV-2 AG UPPER RESP QL IA.RAPID: NOT DETECTED

## 2022-11-29 PROCEDURE — 99214 OFFICE O/P EST MOD 30 MIN: CPT | Performed by: FAMILY MEDICINE

## 2022-11-29 PROCEDURE — 87804 INFLUENZA ASSAY W/OPTIC: CPT | Performed by: FAMILY MEDICINE

## 2022-11-29 PROCEDURE — 96372 THER/PROPH/DIAG INJ SC/IM: CPT | Performed by: FAMILY MEDICINE

## 2022-11-29 PROCEDURE — 87426 SARSCOV CORONAVIRUS AG IA: CPT | Performed by: FAMILY MEDICINE

## 2022-11-29 RX ORDER — PREDNISONE 20 MG/1
TABLET ORAL
Qty: 17 TABLET | Refills: 0 | Status: SHIPPED | OUTPATIENT
Start: 2022-11-29 | End: 2022-12-07

## 2022-11-29 RX ORDER — PROMETHAZINE HYDROCHLORIDE AND CODEINE PHOSPHATE 6.25; 1 MG/5ML; MG/5ML
5 SOLUTION ORAL EVERY 4 HOURS PRN
Qty: 180 ML | Refills: 0 | Status: SHIPPED | OUTPATIENT
Start: 2022-11-29 | End: 2023-02-06

## 2022-11-29 RX ORDER — METHYLPREDNISOLONE SODIUM SUCCINATE 125 MG/2ML
125 INJECTION, POWDER, LYOPHILIZED, FOR SOLUTION INTRAMUSCULAR; INTRAVENOUS ONCE
Status: COMPLETED | OUTPATIENT
Start: 2022-11-29 | End: 2022-11-29

## 2022-11-29 RX ORDER — GUAIFENESIN 600 MG/1
1200 TABLET, EXTENDED RELEASE ORAL 2 TIMES DAILY
Qty: 28 TABLET | Refills: 0 | Status: SHIPPED | OUTPATIENT
Start: 2022-11-29

## 2022-11-29 RX ORDER — CEFDINIR 300 MG/1
300 CAPSULE ORAL 2 TIMES DAILY
Qty: 20 CAPSULE | Refills: 0 | Status: SHIPPED | OUTPATIENT
Start: 2022-11-29 | End: 2023-02-06

## 2022-11-29 RX ORDER — METHYLPREDNISOLONE SODIUM SUCCINATE 125 MG/2ML
125 INJECTION, POWDER, LYOPHILIZED, FOR SOLUTION INTRAMUSCULAR; INTRAVENOUS ONCE
Status: DISCONTINUED | OUTPATIENT
Start: 2022-11-29 | End: 2022-11-29

## 2022-11-29 RX ORDER — CEFTRIAXONE 1 G/1
1 INJECTION, POWDER, FOR SOLUTION INTRAMUSCULAR; INTRAVENOUS ONCE
Status: COMPLETED | OUTPATIENT
Start: 2022-11-29 | End: 2022-11-29

## 2022-11-29 RX ADMIN — METHYLPREDNISOLONE SODIUM SUCCINATE 125 MG: 125 INJECTION, POWDER, LYOPHILIZED, FOR SOLUTION INTRAMUSCULAR; INTRAVENOUS at 11:25

## 2022-11-29 RX ADMIN — CEFTRIAXONE 1 G: 1 INJECTION, POWDER, FOR SOLUTION INTRAMUSCULAR; INTRAVENOUS at 11:22

## 2022-11-29 NOTE — PROGRESS NOTES
"Chief Complaint  Cough, URI, Shortness of Breath, Nasal Congestion, and COPD    Subjective        Callie Basilio presents to [facility]  History of Present Illness  She is here today for follow-up for her chronic medical conditions and for an acute visit.  She has anxiety, depression, COPD, chronic hypoxic respiratory failure and as smoking history.  She sleeps with oxygen and uses it occasionally when she is feeling short of breath.     She is being treated by mental health for her psychiatric conditions.     She has felt bad now for 5 days. She is having cough, SOB and wheezing.     The patient has no other complaints today and denies weakness, numbness, nausea, vomiting, diarrhea, dizziness or syncopal event.          Objective   Vital Signs:  /42 (BP Location: Left arm, Patient Position: Sitting)   Pulse 115   Temp 97.4 °F (36.3 °C)   Resp 16   Ht 162.6 cm (64.02\")   Wt 89 kg (196 lb 1.6 oz)   BMI 33.64 kg/m²   Estimated body mass index is 33.64 kg/m² as calculated from the following:    Height as of this encounter: 162.6 cm (64.02\").    Weight as of this encounter: 89 kg (196 lb 1.6 oz).          Physical Exam  Vitals reviewed.   Constitutional:       Appearance: Normal appearance. She is well-developed. She is obese.   HENT:      Head: Normocephalic and atraumatic.      Right Ear: External ear normal.      Left Ear: External ear normal.      Mouth/Throat:      Pharynx: No oropharyngeal exudate.   Eyes:      Conjunctiva/sclera: Conjunctivae normal.      Pupils: Pupils are equal, round, and reactive to light.   Neck:      Vascular: No carotid bruit.   Cardiovascular:      Rate and Rhythm: Normal rate and regular rhythm.      Heart sounds: No murmur heard.    No friction rub. No gallop.   Pulmonary:      Effort: Pulmonary effort is normal.      Breath sounds: Rhonchi present. No wheezing.   Abdominal:      General: There is no distension.   Skin:     General: Skin " is warm and dry.   Neurological:      Mental Status: She is alert and oriented to person, place, and time.      Cranial Nerves: No cranial nerve deficit.      Motor: No weakness.   Psychiatric:         Mood and Affect: Mood and affect normal.         Behavior: Behavior normal.         Thought Content: Thought content normal.         Judgment: Judgment normal.        Result Review :    CMP    CMP 5/31/22 5/31/22 6/1/22 6/13/22    0108 0443     Glucose 219 (A) 193 (A) 229 (A) 101 (A)   BUN 15 14 16 12   Creatinine 0.85 0.78 0.71 0.85   Sodium 138 135 (A) 137 142   Potassium 4.4 4.5 4.4 3.8   Chloride 103 102 102 105   Calcium 9.4 9.2 9.0 8.9   Albumin   3.20 (A) 3.60   Total Bilirubin   <0.2 0.3   Alkaline Phosphatase   110 77   AST (SGOT)   9 18   ALT (SGPT)   18 20   (A) Abnormal value       Comments are available for some flowsheets but are not being displayed.           CBC    CBC 5/31/22 5/31/22 6/1/22 6/13/22    0108 0443     WBC 17.24 (A) 15.93 (A) 13.76 (A) 6.86   RBC 3.91 3.87 3.53 (A) 3.58 (A)   Hemoglobin 12.8 12.7 11.4 (A) 11.6 (A)   Hematocrit 40.9 41.0 36.6 36.4   .6 (A) 105.9 (A) 103.7 (A) 101.7 (A)   MCH 32.7 32.8 32.3 32.4   MCHC 31.3 (A) 31.0 (A) 31.1 (A) 31.9   RDW 15.3 15.4 15.2 14.2   Platelets 208 209 221 195   (A) Abnormal value                              Assessment and Plan   Diagnoses and all orders for this visit:    1. Bacterial upper respiratory infection (Primary)  Assessment & Plan:  The patient was given a shot today of Rocephin as well as 125 mg Solu-Medrol.  She was sent home with a prednisone taper as well as cefdinir to be taken as directed.  Mucinex was sent in for an expectorant to help her move phlegm from her lungs.  She was told if she does not have improvement call back or go to the ER.    Orders:  -     promethazine-codeine (PHENERGAN with CODEINE) 6.25-10 MG/5ML solution; Take 5 mL by mouth Every 4 (Four) Hours As Needed for Cough.  Dispense: 180 mL; Refill: 0  -      cefTRIAXone (ROCEPHIN) injection 1 g  -     methylPREDNISolone sodium succinate (SOLU-Medrol) injection 125 mg    2. Cough, unspecified type  -     POCT SARS-CoV-2 Antigen EDWARD  -     POCT Influenza A/B    3. Class 1 obesity due to excess calories with serious comorbidity and body mass index (BMI) of 33.0 to 33.9 in adult  Assessment & Plan:  Patient's (Body mass index is 33.64 kg/m².) indicates that they are obese (BMI >30) with health conditions that include hypertension and dyslipidemias . Weight is unchanged. BMI is is above average; BMI management plan is completed. We discussed low calorie, low carb based diet program, portion control and increasing exercise.       4. Primary hypertension  Assessment & Plan:  Hypertension is improving with treatment.  Continue current treatment regimen.  Dietary sodium restriction.  Weight loss.  Blood pressure will be reassessed at the next regular appointment.      Other orders  -     cefdinir (OMNICEF) 300 MG capsule; Take 1 capsule by mouth 2 (Two) Times a Day.  Dispense: 20 capsule; Refill: 0  -     predniSONE (DELTASONE) 20 MG tablet; Take 3 tablets by mouth Daily for 3 days, THEN 2 tablets Daily for 3 days, THEN 1 tablet Daily for 2 days.  Dispense: 17 tablet; Refill: 0  -     guaiFENesin (Mucinex) 600 MG 12 hr tablet; Take 2 tablets by mouth 2 (Two) Times a Day.  Dispense: 28 tablet; Refill: 0           Follow Up   Return if symptoms worsen or fail to improve.  Patient was given instructions and counseling regarding her condition or for health maintenance advice. Please see specific information pulled into the AVS if appropriate.

## 2022-11-29 NOTE — ASSESSMENT & PLAN NOTE
Patient's (Body mass index is 33.64 kg/m².) indicates that they are obese (BMI >30) with health conditions that include hypertension and dyslipidemias . Weight is unchanged. BMI is is above average; BMI management plan is completed. We discussed low calorie, low carb based diet program, portion control and increasing exercise.

## 2022-11-29 NOTE — ASSESSMENT & PLAN NOTE
The patient was given a shot today of Rocephin as well as 125 mg Solu-Medrol.  She was sent home with a prednisone taper as well as cefdinir to be taken as directed.  Mucinex was sent in for an expectorant to help her move phlegm from her lungs.  She was told if she does not have improvement call back or go to the ER.

## 2022-12-21 RX ORDER — CLONAZEPAM 0.5 MG/1
0.5 TABLET ORAL 2 TIMES DAILY
Status: CANCELLED | OUTPATIENT
Start: 2022-12-21

## 2022-12-21 RX ORDER — QUETIAPINE FUMARATE 50 MG/1
TABLET, EXTENDED RELEASE ORAL
Qty: 90 EACH | Status: CANCELLED | OUTPATIENT
Start: 2022-12-21

## 2022-12-21 RX ORDER — QUETIAPINE 300 MG/1
TABLET, FILM COATED, EXTENDED RELEASE ORAL
Status: CANCELLED | OUTPATIENT
Start: 2022-12-21

## 2022-12-21 NOTE — TELEPHONE ENCOUNTER
Caller: Callie Basilio    Relationship: Self    Best call back number: [phone]    Requested Prescriptions:   Requested Prescriptions     Pending Prescriptions Disp Refills   • fluconazole (DIFLUCAN) 150 MG tablet 20 tablet 0   • clonazePAM (KlonoPIN) 0.5 MG tablet       Sig: Take 1 tablet by mouth 2 (Two) Times a Day.   • montelukast (SINGULAIR) 10 MG tablet 90 tablet 0     Sig: Take 1 tablet by mouth Every Evening.   • QUEtiapine fumarate ER (SEROquel XR) 50 MG tablet sustained-release 24 hour tablet 90 each    • losartan (COZAAR) 25 MG tablet 90 tablet 2     Sig: Take 1 tablet by mouth Daily.   • QUEtiapine XR (SEROquel XR) 300 MG 24 hr tablet     • alendronate (FOSAMAX) 70 MG tablet       Sig: Take 1 tablet by mouth Every 7 (Seven) Days.        Pharmacy where request should be sent: [address] [phone] [facility] [phone] [facility]     Does the patient have less than a 3 day supply:  [x] Yes  [] No    Would you like a call back once the refill request has been completed: [x] Yes [] No    If the office needs to give you a call back, can they leave a voicemail: [x] Yes [] No

## 2022-12-23 RX ORDER — MONTELUKAST SODIUM 10 MG/1
10 TABLET ORAL EVERY EVENING
Qty: 90 TABLET | Refills: 1 | Status: SHIPPED | OUTPATIENT
Start: 2022-12-23 | End: 2023-02-06 | Stop reason: SDUPTHER

## 2022-12-23 RX ORDER — LOSARTAN POTASSIUM 25 MG/1
25 TABLET ORAL DAILY
Qty: 90 TABLET | Refills: 1 | Status: SHIPPED | OUTPATIENT
Start: 2022-12-23 | End: 2023-02-06 | Stop reason: SDUPTHER

## 2022-12-23 RX ORDER — FLUCONAZOLE 150 MG/1
TABLET ORAL
Qty: 20 TABLET | Refills: 0 | Status: SHIPPED | OUTPATIENT
Start: 2022-12-23

## 2022-12-23 RX ORDER — ALENDRONATE SODIUM 70 MG/1
70 TABLET ORAL
Qty: 12 TABLET | Refills: 1 | Status: SHIPPED | OUTPATIENT
Start: 2022-12-23 | End: 2023-02-06 | Stop reason: SDUPTHER

## 2022-12-23 NOTE — TELEPHONE ENCOUNTER
I sent in all her med refills except Seraquil and Clonazapam. She is going to mental health and they have been filling it in the past.

## 2023-01-13 RX ORDER — HYDROCODONE BITARTRATE AND ACETAMINOPHEN 7.5; 325 MG/1; MG/1
TABLET ORAL
Qty: 20 TABLET | Refills: 0 | OUTPATIENT
Start: 2023-01-13

## 2023-01-24 RX ORDER — ROFLUMILAST 500 UG/1
TABLET ORAL
Qty: 90 TABLET | Refills: 1 | Status: SHIPPED | OUTPATIENT
Start: 2023-01-24 | End: 2023-02-06 | Stop reason: SDUPTHER

## 2023-02-06 ENCOUNTER — LAB (OUTPATIENT)
Dept: LAB | Facility: HOSPITAL | Age: 63
End: 2023-02-06
Payer: MEDICARE

## 2023-02-06 ENCOUNTER — OFFICE VISIT (OUTPATIENT)
Dept: FAMILY MEDICINE CLINIC | Facility: CLINIC | Age: 63
End: 2023-02-06
Payer: MEDICARE

## 2023-02-06 VITALS
DIASTOLIC BLOOD PRESSURE: 68 MMHG | HEART RATE: 93 BPM | TEMPERATURE: 97.2 F | WEIGHT: 205 LBS | HEIGHT: 64 IN | BODY MASS INDEX: 35 KG/M2 | OXYGEN SATURATION: 100 % | SYSTOLIC BLOOD PRESSURE: 87 MMHG | RESPIRATION RATE: 18 BRPM

## 2023-02-06 DIAGNOSIS — E66.09 CLASS 2 OBESITY DUE TO EXCESS CALORIES WITHOUT SERIOUS COMORBIDITY WITH BODY MASS INDEX (BMI) OF 35.0 TO 35.9 IN ADULT: ICD-10-CM

## 2023-02-06 DIAGNOSIS — E53.8 B12 DEFICIENCY: Chronic | ICD-10-CM

## 2023-02-06 DIAGNOSIS — R73.9 ELEVATED BLOOD SUGAR: ICD-10-CM

## 2023-02-06 DIAGNOSIS — Z00.00 ANNUAL PHYSICAL EXAM: ICD-10-CM

## 2023-02-06 DIAGNOSIS — I10 PRIMARY HYPERTENSION: Chronic | ICD-10-CM

## 2023-02-06 DIAGNOSIS — M54.50 CHRONIC MIDLINE LOW BACK PAIN WITHOUT SCIATICA: Primary | ICD-10-CM

## 2023-02-06 DIAGNOSIS — D64.9 ANEMIA, UNSPECIFIED TYPE: ICD-10-CM

## 2023-02-06 DIAGNOSIS — E78.2 MIXED HYPERLIPIDEMIA: Chronic | ICD-10-CM

## 2023-02-06 DIAGNOSIS — Z51.81 MEDICATION MONITORING ENCOUNTER: ICD-10-CM

## 2023-02-06 DIAGNOSIS — G89.29 CHRONIC MIDLINE LOW BACK PAIN WITHOUT SCIATICA: Primary | ICD-10-CM

## 2023-02-06 PROBLEM — E66.812 CLASS 2 OBESITY DUE TO EXCESS CALORIES WITHOUT SERIOUS COMORBIDITY WITH BODY MASS INDEX (BMI) OF 35.0 TO 35.9 IN ADULT: Status: ACTIVE | Noted: 2021-09-23

## 2023-02-06 PROBLEM — E66.812 CLASS 2 OBESITY DUE TO EXCESS CALORIES WITHOUT SERIOUS COMORBIDITY WITH BODY MASS INDEX (BMI) OF 35.0 TO 35.9 IN ADULT: Chronic | Status: ACTIVE | Noted: 2021-09-23

## 2023-02-06 PROBLEM — E66.811 CLASS 1 OBESITY DUE TO EXCESS CALORIES WITH SERIOUS COMORBIDITY AND BODY MASS INDEX (BMI) OF 33.0 TO 33.9 IN ADULT: Chronic | Status: RESOLVED | Noted: 2021-09-23 | Resolved: 2023-02-06

## 2023-02-06 PROBLEM — J06.9 BACTERIAL UPPER RESPIRATORY INFECTION: Status: RESOLVED | Noted: 2022-11-29 | Resolved: 2023-02-06

## 2023-02-06 PROBLEM — B96.89 BACTERIAL UPPER RESPIRATORY INFECTION: Status: RESOLVED | Noted: 2022-11-29 | Resolved: 2023-02-06

## 2023-02-06 LAB
ALBUMIN SERPL-MCNC: 4.2 G/DL (ref 3.5–5.2)
ALBUMIN/GLOB SERPL: 2.1 G/DL
ALP SERPL-CCNC: 74 U/L (ref 39–117)
ALT SERPL W P-5'-P-CCNC: 19 U/L (ref 1–33)
ANION GAP SERPL CALCULATED.3IONS-SCNC: 11 MMOL/L (ref 5–15)
AST SERPL-CCNC: 17 U/L (ref 1–32)
BASOPHILS # BLD AUTO: 0.05 10*3/MM3 (ref 0–0.2)
BASOPHILS NFR BLD AUTO: 0.8 % (ref 0–1.5)
BILIRUB SERPL-MCNC: 0.2 MG/DL (ref 0–1.2)
BUN SERPL-MCNC: 16 MG/DL (ref 8–23)
BUN/CREAT SERPL: 15.8 (ref 7–25)
CALCIUM SPEC-SCNC: 9.5 MG/DL (ref 8.6–10.5)
CHLORIDE SERPL-SCNC: 101 MMOL/L (ref 98–107)
CHOLEST SERPL-MCNC: 185 MG/DL (ref 0–200)
CO2 SERPL-SCNC: 29 MMOL/L (ref 22–29)
CREAT SERPL-MCNC: 1.01 MG/DL (ref 0.57–1)
DEPRECATED RDW RBC AUTO: 43.1 FL (ref 37–54)
EGFRCR SERPLBLD CKD-EPI 2021: 63.1 ML/MIN/1.73
EOSINOPHIL # BLD AUTO: 0.14 10*3/MM3 (ref 0–0.4)
EOSINOPHIL NFR BLD AUTO: 2.2 % (ref 0.3–6.2)
ERYTHROCYTE [DISTWIDTH] IN BLOOD BY AUTOMATED COUNT: 12.2 % (ref 12.3–15.4)
GLOBULIN UR ELPH-MCNC: 2 GM/DL
GLUCOSE SERPL-MCNC: 96 MG/DL (ref 65–99)
HBA1C MFR BLD: 5.6 % (ref 4.8–5.6)
HCT VFR BLD AUTO: 39 % (ref 34–46.6)
HDLC SERPL-MCNC: 54 MG/DL (ref 40–60)
HGB BLD-MCNC: 12.7 G/DL (ref 12–15.9)
IMM GRANULOCYTES # BLD AUTO: 0.03 10*3/MM3 (ref 0–0.05)
IMM GRANULOCYTES NFR BLD AUTO: 0.5 % (ref 0–0.5)
IRON 24H UR-MRATE: 63 MCG/DL (ref 37–145)
IRON SATN MFR SERPL: 18 % (ref 20–50)
LDLC SERPL CALC-MCNC: 105 MG/DL (ref 0–100)
LDLC/HDLC SERPL: 1.87 {RATIO}
LYMPHOCYTES # BLD AUTO: 2.22 10*3/MM3 (ref 0.7–3.1)
LYMPHOCYTES NFR BLD AUTO: 34.1 % (ref 19.6–45.3)
MCH RBC QN AUTO: 31.2 PG (ref 26.6–33)
MCHC RBC AUTO-ENTMCNC: 32.6 G/DL (ref 31.5–35.7)
MCV RBC AUTO: 95.8 FL (ref 79–97)
MONOCYTES # BLD AUTO: 0.6 10*3/MM3 (ref 0.1–0.9)
MONOCYTES NFR BLD AUTO: 9.2 % (ref 5–12)
MORPHINE SERPLBLD-MCNC: POSITIVE NG/ML
NEUTROPHILS NFR BLD AUTO: 3.47 10*3/MM3 (ref 1.7–7)
NEUTROPHILS NFR BLD AUTO: 53.2 % (ref 42.7–76)
NRBC BLD AUTO-RTO: 0 /100 WBC (ref 0–0.2)
PLATELET # BLD AUTO: 223 10*3/MM3 (ref 140–450)
PMV BLD AUTO: 11 FL (ref 6–12)
POC AMPHETAMINES: NEGATIVE
POC BARBITURATES: NEGATIVE
POC BENZODIAZEPHINES: NEGATIVE
POC COCAINE: NEGATIVE
POC METHADONE: NEGATIVE
POC METHAMPHETAMINE SCREEN URINE: NEGATIVE
POC OPIATES: NEGATIVE
POC OXYCODONE: NEGATIVE
POC PHENCYCLIDINE: NEGATIVE
POC PROPOXYPHENE: NEGATIVE
POC THC: NEGATIVE
POC TRICYCLIC ANTIDEPRESSANTS: NEGATIVE
POTASSIUM SERPL-SCNC: 4 MMOL/L (ref 3.5–5.2)
PROT SERPL-MCNC: 6.2 G/DL (ref 6–8.5)
RBC # BLD AUTO: 4.07 10*6/MM3 (ref 3.77–5.28)
SODIUM SERPL-SCNC: 141 MMOL/L (ref 136–145)
TIBC SERPL-MCNC: 356 MCG/DL (ref 298–536)
TRANSFERRIN SERPL-MCNC: 239 MG/DL (ref 200–360)
TRIGL SERPL-MCNC: 150 MG/DL (ref 0–150)
VLDLC SERPL-MCNC: 26 MG/DL (ref 5–40)
WBC NRBC COR # BLD: 6.51 10*3/MM3 (ref 3.4–10.8)

## 2023-02-06 PROCEDURE — 83036 HEMOGLOBIN GLYCOSYLATED A1C: CPT

## 2023-02-06 PROCEDURE — 83540 ASSAY OF IRON: CPT

## 2023-02-06 PROCEDURE — 80305 DRUG TEST PRSMV DIR OPT OBS: CPT | Performed by: FAMILY MEDICINE

## 2023-02-06 PROCEDURE — 80061 LIPID PANEL: CPT

## 2023-02-06 PROCEDURE — 82746 ASSAY OF FOLIC ACID SERUM: CPT

## 2023-02-06 PROCEDURE — 82607 VITAMIN B-12: CPT

## 2023-02-06 PROCEDURE — 36415 COLL VENOUS BLD VENIPUNCTURE: CPT

## 2023-02-06 PROCEDURE — 85025 COMPLETE CBC W/AUTO DIFF WBC: CPT

## 2023-02-06 PROCEDURE — 82728 ASSAY OF FERRITIN: CPT

## 2023-02-06 PROCEDURE — 84443 ASSAY THYROID STIM HORMONE: CPT

## 2023-02-06 PROCEDURE — 96372 THER/PROPH/DIAG INJ SC/IM: CPT | Performed by: FAMILY MEDICINE

## 2023-02-06 PROCEDURE — 99214 OFFICE O/P EST MOD 30 MIN: CPT | Performed by: FAMILY MEDICINE

## 2023-02-06 PROCEDURE — 84466 ASSAY OF TRANSFERRIN: CPT

## 2023-02-06 PROCEDURE — 80053 COMPREHEN METABOLIC PANEL: CPT

## 2023-02-06 RX ORDER — HYDROCODONE BITARTRATE AND ACETAMINOPHEN 7.5; 325 MG/1; MG/1
1 TABLET ORAL EVERY 6 HOURS PRN
Qty: 120 TABLET | Refills: 0 | Status: SHIPPED | OUTPATIENT
Start: 2023-02-06

## 2023-02-06 RX ORDER — KETOROLAC TROMETHAMINE 30 MG/ML
60 INJECTION, SOLUTION INTRAMUSCULAR; INTRAVENOUS ONCE
Status: SHIPPED | OUTPATIENT
Start: 2023-02-06 | End: 2023-02-11

## 2023-02-06 RX ORDER — ALBUTEROL SULFATE 1.25 MG/3ML
1 SOLUTION RESPIRATORY (INHALATION) EVERY 4 HOURS PRN
Qty: 100 EACH | Refills: 5 | Status: SHIPPED | OUTPATIENT
Start: 2023-02-06

## 2023-02-06 RX ORDER — UBIDECARENONE 75 MG
50 CAPSULE ORAL DAILY
Qty: 90 TABLET | Refills: 1 | Status: SHIPPED | OUTPATIENT
Start: 2023-02-06

## 2023-02-06 RX ORDER — MAGNESIUM OXIDE 400 MG/1
400 TABLET ORAL DAILY
Qty: 30 TABLET | Refills: 5 | Status: SHIPPED | OUTPATIENT
Start: 2023-02-06

## 2023-02-06 RX ORDER — DICLOFENAC SODIUM 75 MG/1
75 TABLET, DELAYED RELEASE ORAL 2 TIMES DAILY
Qty: 180 TABLET | Refills: 1 | Status: SHIPPED | OUTPATIENT
Start: 2023-02-06

## 2023-02-06 RX ORDER — ASPIRIN 81 MG/1
81 TABLET ORAL DAILY
Qty: 90 TABLET | Refills: 1 | Status: SHIPPED | OUTPATIENT
Start: 2023-02-06

## 2023-02-06 RX ORDER — LOSARTAN POTASSIUM 25 MG/1
25 TABLET ORAL DAILY
Qty: 90 TABLET | Refills: 1 | Status: SHIPPED | OUTPATIENT
Start: 2023-02-06

## 2023-02-06 RX ORDER — MONTELUKAST SODIUM 10 MG/1
10 TABLET ORAL EVERY EVENING
Qty: 90 TABLET | Refills: 1 | Status: SHIPPED | OUTPATIENT
Start: 2023-02-06

## 2023-02-06 RX ORDER — ATORVASTATIN CALCIUM 20 MG/1
20 TABLET, FILM COATED ORAL NIGHTLY
Qty: 90 TABLET | Refills: 1 | Status: SHIPPED | OUTPATIENT
Start: 2023-02-06

## 2023-02-06 RX ORDER — ALENDRONATE SODIUM 70 MG/1
70 TABLET ORAL
Qty: 12 TABLET | Refills: 1 | Status: SHIPPED | OUTPATIENT
Start: 2023-02-06

## 2023-02-06 RX ORDER — ERGOCALCIFEROL 1.25 MG/1
50000 CAPSULE ORAL WEEKLY
Qty: 5 CAPSULE | Refills: 5 | Status: SHIPPED | OUTPATIENT
Start: 2023-02-06

## 2023-02-06 RX ORDER — ROFLUMILAST 500 UG/1
500 TABLET ORAL DAILY
Qty: 90 TABLET | Refills: 1 | Status: SHIPPED | OUTPATIENT
Start: 2023-02-06

## 2023-02-06 RX ORDER — CYCLOBENZAPRINE HCL 10 MG
10 TABLET ORAL 3 TIMES DAILY PRN
Qty: 90 TABLET | Refills: 2 | Status: SHIPPED | OUTPATIENT
Start: 2023-02-06

## 2023-02-06 RX ORDER — BUDESONIDE, GLYCOPYRROLATE, AND FORMOTEROL FUMARATE 160; 9; 4.8 UG/1; UG/1; UG/1
2 AEROSOL, METERED RESPIRATORY (INHALATION) 2 TIMES DAILY
Qty: 10.7 G | Refills: 5 | Status: SHIPPED | OUTPATIENT
Start: 2023-02-06

## 2023-02-06 RX ORDER — BUDESONIDE 0.5 MG/2ML
0.5 INHALANT ORAL
Qty: 90 EACH | Refills: 1 | Status: SHIPPED | OUTPATIENT
Start: 2023-02-06

## 2023-02-06 RX ORDER — CETIRIZINE HYDROCHLORIDE 10 MG/1
10 TABLET ORAL DAILY
Qty: 90 TABLET | Refills: 1 | Status: SHIPPED | OUTPATIENT
Start: 2023-02-06

## 2023-02-06 RX ORDER — ALBUTEROL SULFATE 90 UG/1
2 AEROSOL, METERED RESPIRATORY (INHALATION) EVERY 4 HOURS PRN
Start: 2023-02-06 | End: 2023-02-15 | Stop reason: SDUPTHER

## 2023-02-06 RX ADMIN — CYANOCOBALAMIN 1000 MCG: 1000 INJECTION, SOLUTION INTRAMUSCULAR; SUBCUTANEOUS at 12:21

## 2023-02-06 NOTE — ASSESSMENT & PLAN NOTE
Patient's (Body mass index is 35.17 kg/m².) indicates that they are obese (BMI >30) with health conditions that include hypertension and dyslipidemias . Weight is improving with lifestyle modifications. BMI  is above average; BMI management plan is completed. We discussed low calorie, low carb based diet program, portion control and increasing exercise.

## 2023-02-06 NOTE — PROGRESS NOTES
"Chief Complaint  Medication Problem (Review medications ), Edema (Leg swelling ), and would like 90 day supply for diflucan and other medications     Subjective        Callie Basilio presents to [facility]  History of Present Illness  She is here today for follow-up for her chronic medical conditions and for an acute visit.  She has anxiety, depression, COPD, chronic hypoxic respiratory failure and as smoking history.  She sleeps with oxygen and uses it occasionally when she is feeling short of breath.     She is being treated by mental health for her psychiatric conditions.     She has felt bad now for 5 days. She is having cough, SOB and wheezing.     The patient has no other complaints today and denies weakness, numbness, nausea, vomiting, diarrhea, dizziness or syncopal event.      Objective   Vital Signs:  BP (!) 87/68   Pulse 93   Temp 97.2 °F (36.2 °C)   Resp 18   Ht 162.6 cm (64.02\")   Wt 93 kg (205 lb)   SpO2 100%   BMI 35.17 kg/m²   Estimated body mass index is 35.17 kg/m² as calculated from the following:    Height as of this encounter: 162.6 cm (64.02\").    Weight as of this encounter: 93 kg (205 lb).             Physical Exam  Vitals reviewed.   Constitutional:       Appearance: Normal appearance. She is well-developed. She is obese.   HENT:      Head: Normocephalic and atraumatic.      Right Ear: External ear normal.      Left Ear: External ear normal.      Mouth/Throat:      Pharynx: No oropharyngeal exudate.   Eyes:      Conjunctiva/sclera: Conjunctivae normal.      Pupils: Pupils are equal, round, and reactive to light.   Neck:      Vascular: No carotid bruit.   Cardiovascular:      Rate and Rhythm: Normal rate and regular rhythm.      Heart sounds: No murmur heard.    No friction rub. No gallop.   Pulmonary:      Effort: Pulmonary effort is normal.      Breath sounds: Normal breath sounds. No wheezing or rhonchi.   Abdominal:      General: There is no " distension.   Skin:     General: Skin is warm and dry.   Neurological:      Mental Status: She is alert and oriented to person, place, and time.      Cranial Nerves: No cranial nerve deficit.      Motor: No weakness.   Psychiatric:         Mood and Affect: Mood and affect normal.         Behavior: Behavior normal.         Thought Content: Thought content normal.         Judgment: Judgment normal.        Result Review :    CMP    CMP 5/31/22 5/31/22 6/1/22 6/13/22 0108 0443     Glucose 219 (A) 193 (A) 229 (A) 101 (A)   BUN 15 14 16 12   Creatinine 0.85 0.78 0.71 0.85   eGFR 77.6 86.0 96.3 77.6   Sodium 138 135 (A) 137 142   Potassium 4.4 4.5 4.4 3.8   Chloride 103 102 102 105   Calcium 9.4 9.2 9.0 8.9   Total Protein   6.3 6.2   Albumin   3.20 (A) 3.60   Globulin   3.1 2.6   Total Bilirubin   <0.2 0.3   Alkaline Phosphatase   110 77   AST (SGOT)   9 18   ALT (SGPT)   18 20   Albumin/Globulin Ratio   1.0 1.4   BUN/Creatinine Ratio 17.6 17.9 22.5 14.1   Anion Gap 7.7 13.0 8.6 14.5   (A) Abnormal value       Comments are available for some flowsheets but are not being displayed.           CBC    CBC 5/31/22 5/31/22 6/1/22 6/13/22 0108 0443     WBC 17.24 (A) 15.93 (A) 13.76 (A) 6.86   RBC 3.91 3.87 3.53 (A) 3.58 (A)   Hemoglobin 12.8 12.7 11.4 (A) 11.6 (A)   Hematocrit 40.9 41.0 36.6 36.4   .6 (A) 105.9 (A) 103.7 (A) 101.7 (A)   MCH 32.7 32.8 32.3 32.4   MCHC 31.3 (A) 31.0 (A) 31.1 (A) 31.9   RDW 15.3 15.4 15.2 14.2   Platelets 208 209 221 195   (A) Abnormal value                                 Assessment and Plan   Diagnoses and all orders for this visit:    1. Chronic midline low back pain without sciatica (Primary)  Assessment & Plan:  The patient's pain is improving with Norco as prescribed.  UDS, controlled medication contract and El in the chart for review.  She was given a Toradol shot today for an acute exacerbation of her back pain.    Orders:  -     HYDROcodone-acetaminophen (NORCO) 7.5-325 MG  per tablet; Take 1 tablet by mouth Every 6 (Six) Hours As Needed for Moderate Pain.  Dispense: 120 tablet; Refill: 0  -     ketorolac (TORADOL) injection 60 mg    2. Medication monitoring encounter  -     POC Urine Drug Screen, Triage    3. Class 2 obesity due to excess calories without serious comorbidity with body mass index (BMI) of 35.0 to 35.9 in adult  Assessment & Plan:  Patient's (Body mass index is 35.17 kg/m².) indicates that they are obese (BMI >30) with health conditions that include hypertension and dyslipidemias . Weight is improving with lifestyle modifications. BMI  is above average; BMI management plan is completed. We discussed low calorie, low carb based diet program, portion control and increasing exercise.       4. Primary hypertension  Assessment & Plan:  Hypertension is improving with treatment.  Continue current treatment regimen.  Dietary sodium restriction.  Weight loss.  Blood pressure will be reassessed at the next regular appointment.      5. Anemia, unspecified type  -     Vitamin B12; Future  -     Folate; Future  -     Iron and TIBC; Future  -     Ferritin; Future    6. B12 deficiency    7. Elevated blood sugar  -     Hemoglobin A1c; Future    Other orders  -     albuterol (ACCUNEB) 1.25 MG/3ML nebulizer solution; Take 3 mL by nebulization Every 4 (Four) Hours As Needed for Wheezing.  Dispense: 100 each; Refill: 5  -     albuterol sulfate  (90 Base) MCG/ACT inhaler; Inhale 2 puffs Every 4 (Four) Hours As Needed for Wheezing or Shortness of Air.  -     alendronate (FOSAMAX) 70 MG tablet; Take 1 tablet by mouth Every 7 (Seven) Days.  Dispense: 12 tablet; Refill: 1  -     aspirin 81 MG EC tablet; Take 1 tablet by mouth Daily.  Dispense: 90 tablet; Refill: 1  -     atorvastatin (LIPITOR) 20 MG tablet; Take 1 tablet by mouth Every Night.  Dispense: 90 tablet; Refill: 1  -     Breztri Aerosphere 160-9-4.8 MCG/ACT aerosol inhaler; Inhale 2 puffs 2 (Two) Times a Day.  Dispense: 10.7 g;  Refill: 5  -     budesonide (PULMICORT) 0.5 MG/2ML nebulizer solution; Take 2 mL by nebulization Daily.  Dispense: 90 each; Refill: 1  -     cetirizine (zyrTEC) 10 MG tablet; Take 1 tablet by mouth Daily.  Dispense: 90 tablet; Refill: 1  -     cyclobenzaprine (FLEXERIL) 10 MG tablet; Take 1 tablet by mouth 3 (Three) Times a Day As Needed for Muscle Spasms.  Dispense: 90 tablet; Refill: 2  -     roflumilast (Daliresp) 500 MCG tablet tablet; Take 1 tablet by mouth Daily.  Dispense: 90 tablet; Refill: 1  -     diclofenac (VOLTAREN) 75 MG EC tablet; Take 1 tablet by mouth 2 (Two) Times a Day.  Dispense: 180 tablet; Refill: 1  -     losartan (COZAAR) 25 MG tablet; Take 1 tablet by mouth Daily.  Dispense: 90 tablet; Refill: 1  -     magnesium oxide (MAG-OX) 400 MG tablet; Take 1 tablet by mouth Daily.  Dispense: 30 tablet; Refill: 5  -     montelukast (SINGULAIR) 10 MG tablet; Take 1 tablet by mouth Every Evening.  Dispense: 90 tablet; Refill: 1  -     vitamin B-12 (CYANOCOBALAMIN) 100 MCG tablet; Take 0.5 tablets by mouth Daily.  Dispense: 90 tablet; Refill: 1  -     vitamin D (ERGOCALCIFEROL) 1.25 MG (52680 UT) capsule capsule; Take 1 capsule by mouth 1 (One) Time Per Week.  Dispense: 5 capsule; Refill: 5           Follow Up   Return in about 3 months (around 5/6/2023).  Patient was given instructions and counseling regarding her condition or for health maintenance advice. Please see specific information pulled into the AVS if appropriate.

## 2023-02-06 NOTE — ASSESSMENT & PLAN NOTE
The patient's pain is improving with Norco as prescribed.  UDS, controlled medication contract and El in the chart for review.  She was given a Toradol shot today for an acute exacerbation of her back pain.

## 2023-02-07 ENCOUNTER — TELEPHONE (OUTPATIENT)
Dept: FAMILY MEDICINE CLINIC | Facility: CLINIC | Age: 63
End: 2023-02-07
Payer: MEDICARE

## 2023-02-07 LAB
FERRITIN SERPL-MCNC: 75.6 NG/ML (ref 13–150)
FOLATE SERPL-MCNC: 5.57 NG/ML (ref 4.78–24.2)
TSH SERPL DL<=0.05 MIU/L-ACNC: 2.7 UIU/ML (ref 0.27–4.2)
VIT B12 BLD-MCNC: >2000 PG/ML (ref 211–946)

## 2023-02-07 NOTE — TELEPHONE ENCOUNTER
Patient called asking if you could send in voltaren compound cream for her and she was also wondering why she would come back positive for morphine on uds.she states she is allergic to it and has not taken any.

## 2023-02-12 NOTE — TELEPHONE ENCOUNTER
Voltaren cream sent to the pharmacy. UDS results are sometimes erroneous and I would just let her know it was a false positive. One of her other medications may have caused the false positive result.

## 2023-02-13 NOTE — TELEPHONE ENCOUNTER
Spoke with patient she is aware medication was sent to the pharmacy and is aware of Dr. West's response to her UDS results.

## 2023-02-15 RX ORDER — ALBUTEROL SULFATE 90 UG/1
2 AEROSOL, METERED RESPIRATORY (INHALATION) EVERY 4 HOURS PRN
Start: 2023-02-15

## 2023-02-17 ENCOUNTER — TELEPHONE (OUTPATIENT)
Dept: FAMILY MEDICINE CLINIC | Facility: CLINIC | Age: 63
End: 2023-02-17
Payer: MEDICARE

## 2023-02-17 NOTE — TELEPHONE ENCOUNTER
Spoke with Pharmacy, unable to order patient's compound cream through system. Per Dr. West, ok for pharmacy to refill.    Joe at pharmacy aware   Scanning medication compound into chart into chart

## 2023-04-20 ENCOUNTER — TELEPHONE (OUTPATIENT)
Dept: FAMILY MEDICINE CLINIC | Facility: CLINIC | Age: 63
End: 2023-04-20
Payer: MEDICARE

## 2023-04-20 NOTE — TELEPHONE ENCOUNTER
Patient's compound cream needed PA through work comp.  This was for Diclofenac/lidocaine/prilocaine cream.  It was approved.  I spoke with Jean.  This is approved this fill only, PA will be needed each time patient needs refill.  Jean will contact [facility] pharmacy to make them aware of approval.

## 2023-06-02 ENCOUNTER — TELEPHONE (OUTPATIENT)
Dept: FAMILY MEDICINE CLINIC | Facility: CLINIC | Age: 63
End: 2023-06-02

## 2023-06-02 RX ORDER — ALBUTEROL SULFATE 1.25 MG/3ML
1 SOLUTION RESPIRATORY (INHALATION) EVERY 4 HOURS PRN
Qty: 100 EACH | Refills: 5 | Status: SHIPPED | OUTPATIENT
Start: 2023-06-02

## 2023-06-02 RX ORDER — FLUCONAZOLE 150 MG/1
TABLET ORAL
Qty: 20 TABLET | Refills: 2 | Status: SHIPPED | OUTPATIENT
Start: 2023-06-02 | End: 2023-07-14 | Stop reason: SDUPTHER

## 2023-07-14 ENCOUNTER — OFFICE VISIT (OUTPATIENT)
Dept: FAMILY MEDICINE CLINIC | Facility: CLINIC | Age: 63
End: 2023-07-14
Payer: MEDICARE

## 2023-07-14 VITALS
RESPIRATION RATE: 18 BRPM | TEMPERATURE: 98.2 F | BODY MASS INDEX: 35.03 KG/M2 | OXYGEN SATURATION: 97 % | HEART RATE: 110 BPM | WEIGHT: 205.2 LBS | DIASTOLIC BLOOD PRESSURE: 72 MMHG | HEIGHT: 64 IN | SYSTOLIC BLOOD PRESSURE: 119 MMHG

## 2023-07-14 DIAGNOSIS — Z12.31 ENCOUNTER FOR SCREENING MAMMOGRAM FOR MALIGNANT NEOPLASM OF BREAST: ICD-10-CM

## 2023-07-14 DIAGNOSIS — I10 PRIMARY HYPERTENSION: Chronic | ICD-10-CM

## 2023-07-14 DIAGNOSIS — L82.1 SEBORRHEIC KERATOSIS: Primary | ICD-10-CM

## 2023-07-14 DIAGNOSIS — E66.09 CLASS 2 OBESITY DUE TO EXCESS CALORIES WITHOUT SERIOUS COMORBIDITY WITH BODY MASS INDEX (BMI) OF 35.0 TO 35.9 IN ADULT: Chronic | ICD-10-CM

## 2023-07-14 DIAGNOSIS — Z23 NEED FOR VACCINATION: ICD-10-CM

## 2023-07-14 RX ORDER — FLUCONAZOLE 150 MG/1
TABLET ORAL
Qty: 20 TABLET | Refills: 2 | Status: SHIPPED | OUTPATIENT
Start: 2023-07-14

## 2023-07-30 PROBLEM — L82.1 SEBORRHEIC KERATOSIS: Status: ACTIVE | Noted: 2023-07-30

## 2023-07-30 PROBLEM — L82.1 SEBORRHEIC KERATOSIS: Chronic | Status: ACTIVE | Noted: 2023-07-30

## 2023-08-04 ENCOUNTER — OFFICE VISIT (OUTPATIENT)
Dept: FAMILY MEDICINE CLINIC | Facility: CLINIC | Age: 63
End: 2023-08-04
Payer: MEDICARE

## 2023-08-04 VITALS
SYSTOLIC BLOOD PRESSURE: 114 MMHG | HEIGHT: 64 IN | OXYGEN SATURATION: 99 % | BODY MASS INDEX: 35.41 KG/M2 | TEMPERATURE: 97.1 F | DIASTOLIC BLOOD PRESSURE: 54 MMHG | HEART RATE: 101 BPM | RESPIRATION RATE: 18 BRPM | WEIGHT: 207.4 LBS

## 2023-08-04 DIAGNOSIS — J44.9 COPD, SEVERE: Chronic | ICD-10-CM

## 2023-08-04 DIAGNOSIS — E66.09 CLASS 2 OBESITY DUE TO EXCESS CALORIES WITHOUT SERIOUS COMORBIDITY WITH BODY MASS INDEX (BMI) OF 35.0 TO 35.9 IN ADULT: Chronic | ICD-10-CM

## 2023-08-04 DIAGNOSIS — Z51.81 MEDICATION MONITORING ENCOUNTER: ICD-10-CM

## 2023-08-04 DIAGNOSIS — M54.50 CHRONIC MIDLINE LOW BACK PAIN WITHOUT SCIATICA: ICD-10-CM

## 2023-08-04 DIAGNOSIS — I10 PRIMARY HYPERTENSION: Chronic | ICD-10-CM

## 2023-08-04 DIAGNOSIS — G89.29 CHRONIC MIDLINE LOW BACK PAIN WITHOUT SCIATICA: ICD-10-CM

## 2023-08-04 DIAGNOSIS — E78.2 MIXED HYPERLIPIDEMIA: Chronic | ICD-10-CM

## 2023-08-04 DIAGNOSIS — L82.1 SEBORRHEIC KERATOSIS: Primary | Chronic | ICD-10-CM

## 2023-08-04 DIAGNOSIS — M81.8 OTHER OSTEOPOROSIS WITHOUT CURRENT PATHOLOGICAL FRACTURE: Chronic | ICD-10-CM

## 2023-08-04 DIAGNOSIS — J30.9 ALLERGIC RHINITIS, UNSPECIFIED SEASONALITY, UNSPECIFIED TRIGGER: Chronic | ICD-10-CM

## 2023-08-04 PROBLEM — J96.11 CHRONIC RESPIRATORY FAILURE WITH HYPOXIA: Chronic | Status: RESOLVED | Noted: 2021-09-22 | Resolved: 2023-08-04

## 2023-08-04 LAB
POC AMPHETAMINES: NEGATIVE
POC BARBITURATES: NEGATIVE
POC BENZODIAZEPHINES: NEGATIVE
POC COCAINE: NEGATIVE
POC METHADONE: NEGATIVE
POC METHAMPHETAMINE SCREEN URINE: NEGATIVE
POC OPIATES: NEGATIVE
POC OXYCODONE: NEGATIVE
POC PHENCYCLIDINE: NEGATIVE
POC PROPOXYPHENE: NEGATIVE
POC THC: NEGATIVE
POC TRICYCLIC ANTIDEPRESSANTS: NEGATIVE

## 2023-08-04 RX ORDER — HYDROCODONE BITARTRATE AND ACETAMINOPHEN 7.5; 325 MG/1; MG/1
1 TABLET ORAL EVERY 6 HOURS PRN
Qty: 120 TABLET | Refills: 0 | Status: SHIPPED | OUTPATIENT
Start: 2023-08-04

## 2023-08-04 RX ORDER — UBIDECARENONE 75 MG
50 CAPSULE ORAL DAILY
Qty: 90 TABLET | Refills: 1 | Status: SHIPPED | OUTPATIENT
Start: 2023-08-04

## 2023-08-04 RX ORDER — CETIRIZINE HYDROCHLORIDE 10 MG/1
10 TABLET ORAL DAILY
Qty: 90 TABLET | Refills: 1 | Status: SHIPPED | OUTPATIENT
Start: 2023-08-04

## 2023-08-04 RX ORDER — DICLOFENAC SODIUM 75 MG/1
75 TABLET, DELAYED RELEASE ORAL 2 TIMES DAILY
Qty: 180 TABLET | Refills: 1 | Status: SHIPPED | OUTPATIENT
Start: 2023-08-04

## 2023-08-04 RX ORDER — GUAIFENESIN 600 MG/1
1200 TABLET, EXTENDED RELEASE ORAL 2 TIMES DAILY
Qty: 28 TABLET | Refills: 0 | Status: CANCELLED | OUTPATIENT
Start: 2023-08-04

## 2023-08-04 RX ORDER — ASPIRIN 81 MG/1
81 TABLET ORAL DAILY
Qty: 90 TABLET | Refills: 1 | Status: SHIPPED | OUTPATIENT
Start: 2023-08-04

## 2023-08-04 RX ORDER — MONTELUKAST SODIUM 10 MG/1
TABLET ORAL
Qty: 90 TABLET | Refills: 1 | Status: SHIPPED | OUTPATIENT
Start: 2023-08-04

## 2023-08-04 RX ORDER — FUROSEMIDE 40 MG/1
20 TABLET ORAL 2 TIMES DAILY
Qty: 270 TABLET | Refills: 1 | Status: SHIPPED | OUTPATIENT
Start: 2023-08-04

## 2023-08-04 RX ORDER — ROFLUMILAST 500 UG/1
500 TABLET ORAL DAILY
Qty: 90 TABLET | Refills: 1 | Status: SHIPPED | OUTPATIENT
Start: 2023-08-04

## 2023-08-04 RX ORDER — BUDESONIDE 0.5 MG/2ML
0.5 INHALANT ORAL
Qty: 90 EACH | Refills: 1 | Status: SHIPPED | OUTPATIENT
Start: 2023-08-04

## 2023-08-04 RX ORDER — ATORVASTATIN CALCIUM 20 MG/1
20 TABLET, FILM COATED ORAL NIGHTLY
Qty: 90 TABLET | Refills: 1 | Status: SHIPPED | OUTPATIENT
Start: 2023-08-04

## 2023-08-04 RX ORDER — ALENDRONATE SODIUM 70 MG/1
70 TABLET ORAL
Qty: 12 TABLET | Refills: 1 | Status: SHIPPED | OUTPATIENT
Start: 2023-08-04

## 2023-09-11 RX ORDER — ERGOCALCIFEROL 1.25 MG/1
50000 CAPSULE ORAL WEEKLY
Qty: 5 CAPSULE | Refills: 4 | Status: SHIPPED | OUTPATIENT
Start: 2023-09-11

## 2023-09-15 ENCOUNTER — LAB (OUTPATIENT)
Dept: LAB | Facility: HOSPITAL | Age: 63
End: 2023-09-15
Payer: MEDICARE

## 2023-09-15 ENCOUNTER — HOSPITAL ENCOUNTER (OUTPATIENT)
Dept: GENERAL RADIOLOGY | Facility: HOSPITAL | Age: 63
Discharge: HOME | End: 2023-09-15
Payer: MEDICARE

## 2023-09-15 DIAGNOSIS — R31.9 HEMATURIA, UNSPECIFIED TYPE: Primary | ICD-10-CM

## 2023-09-15 DIAGNOSIS — R31.9 HEMATURIA, UNSPECIFIED TYPE: ICD-10-CM

## 2023-09-15 LAB
BACTERIA UR QL AUTO: ABNORMAL /HPF
BILIRUB UR QL STRIP: NEGATIVE
CLARITY UR: ABNORMAL
COLOR UR: YELLOW
GLUCOSE UR STRIP-MCNC: NEGATIVE MG/DL
HGB UR QL STRIP.AUTO: ABNORMAL
HYALINE CASTS UR QL AUTO: ABNORMAL /LPF
KETONES UR QL STRIP: NEGATIVE
LEUKOCYTE ESTERASE UR QL STRIP.AUTO: ABNORMAL
NITRITE UR QL STRIP: NEGATIVE
PH UR STRIP.AUTO: 6.5 [PH] (ref 5–8)
PROT UR QL STRIP: ABNORMAL
RBC # UR STRIP: ABNORMAL /HPF
REF LAB TEST METHOD: ABNORMAL
SP GR UR STRIP: 1.01 (ref 1–1.03)
SQUAMOUS #/AREA URNS HPF: ABNORMAL /HPF
UROBILINOGEN UR QL STRIP: ABNORMAL
WBC # UR STRIP: ABNORMAL /HPF

## 2023-09-15 PROCEDURE — 87186 SC STD MICRODIL/AGAR DIL: CPT

## 2023-09-15 PROCEDURE — 87086 URINE CULTURE/COLONY COUNT: CPT

## 2023-09-15 PROCEDURE — 74018 RADEX ABDOMEN 1 VIEW: CPT

## 2023-09-15 PROCEDURE — 81001 URINALYSIS AUTO W/SCOPE: CPT

## 2023-09-15 PROCEDURE — 87088 URINE BACTERIA CULTURE: CPT

## 2023-09-15 RX ORDER — TAMSULOSIN HYDROCHLORIDE 0.4 MG/1
1 CAPSULE ORAL DAILY
Qty: 14 CAPSULE | Refills: 0 | Status: SHIPPED | OUTPATIENT
Start: 2023-09-15

## 2023-09-16 LAB — BACTERIA SPEC AEROBE CULT: ABNORMAL

## 2023-09-17 RX ORDER — FLUCONAZOLE 150 MG/1
TABLET ORAL
Qty: 10 TABLET | Refills: 0 | Status: SHIPPED | OUTPATIENT
Start: 2023-09-17

## 2023-09-17 RX ORDER — CEFDINIR 300 MG/1
300 CAPSULE ORAL 2 TIMES DAILY
Qty: 14 CAPSULE | Refills: 0 | Status: SHIPPED | OUTPATIENT
Start: 2023-09-17

## 2023-10-06 ENCOUNTER — HOSPITAL ENCOUNTER (OUTPATIENT)
Dept: MAMMOGRAPHY | Facility: HOSPITAL | Age: 63
Discharge: HOME | End: 2023-10-06
Admitting: FAMILY MEDICINE
Payer: MEDICARE

## 2023-10-06 DIAGNOSIS — Z12.31 ENCOUNTER FOR SCREENING MAMMOGRAM FOR MALIGNANT NEOPLASM OF BREAST: ICD-10-CM

## 2023-10-06 DIAGNOSIS — Z12.31 VISIT FOR SCREENING MAMMOGRAM: ICD-10-CM

## 2023-10-06 PROCEDURE — 77063 BREAST TOMOSYNTHESIS BI: CPT

## 2023-10-06 PROCEDURE — 77067 SCR MAMMO BI INCL CAD: CPT

## 2023-10-12 DIAGNOSIS — M54.50 CHRONIC MIDLINE LOW BACK PAIN WITHOUT SCIATICA: ICD-10-CM

## 2023-10-12 DIAGNOSIS — G89.29 CHRONIC MIDLINE LOW BACK PAIN WITHOUT SCIATICA: ICD-10-CM

## 2023-10-12 RX ORDER — HYDROCODONE BITARTRATE AND ACETAMINOPHEN 7.5; 325 MG/1; MG/1
1 TABLET ORAL EVERY 6 HOURS PRN
Qty: 120 TABLET | Refills: 0 | Status: SHIPPED | OUTPATIENT
Start: 2023-10-12

## 2023-10-13 ENCOUNTER — LAB (OUTPATIENT)
Dept: LAB | Facility: HOSPITAL | Age: 63
End: 2023-10-13
Payer: MEDICARE

## 2023-10-13 ENCOUNTER — TRANSCRIBE ORDERS (OUTPATIENT)
Dept: LAB | Facility: HOSPITAL | Age: 63
End: 2023-10-13
Payer: MEDICARE

## 2023-10-13 DIAGNOSIS — Z79.899 ENCOUNTER FOR LONG-TERM (CURRENT) USE OF OTHER MEDICATIONS: Primary | ICD-10-CM

## 2023-10-13 DIAGNOSIS — Z79.899 ENCOUNTER FOR LONG-TERM (CURRENT) USE OF OTHER MEDICATIONS: ICD-10-CM

## 2023-10-13 LAB
AMPHET+METHAMPHET UR QL: NEGATIVE
BARBITURATES UR QL SCN: NEGATIVE
BENZODIAZ UR QL SCN: NEGATIVE
CANNABINOIDS SERPL QL: NEGATIVE
COCAINE UR QL: NEGATIVE
FENTANYL UR-MCNC: NEGATIVE NG/ML
METHADONE UR QL SCN: POSITIVE
OPIATES UR QL: NEGATIVE
OXYCODONE UR QL SCN: NEGATIVE

## 2023-10-13 PROCEDURE — 80307 DRUG TEST PRSMV CHEM ANLYZR: CPT

## 2023-10-13 RX ORDER — PROMETHAZINE HYDROCHLORIDE 25 MG/1
TABLET ORAL
Qty: 120 TABLET | Refills: 4 | Status: SHIPPED | OUTPATIENT
Start: 2023-10-13

## 2023-10-30 RX ORDER — CYCLOBENZAPRINE HCL 10 MG
10 TABLET ORAL 3 TIMES DAILY PRN
Qty: 90 TABLET | Refills: 1 | Status: SHIPPED | OUTPATIENT
Start: 2023-10-30

## 2023-11-15 ENCOUNTER — OFFICE VISIT (OUTPATIENT)
Dept: FAMILY MEDICINE CLINIC | Facility: CLINIC | Age: 63
End: 2023-11-15
Payer: MEDICARE

## 2023-11-15 VITALS
SYSTOLIC BLOOD PRESSURE: 140 MMHG | HEIGHT: 64 IN | WEIGHT: 198.2 LBS | OXYGEN SATURATION: 98 % | RESPIRATION RATE: 18 BRPM | TEMPERATURE: 97.5 F | HEART RATE: 88 BPM | DIASTOLIC BLOOD PRESSURE: 80 MMHG | BODY MASS INDEX: 33.84 KG/M2

## 2023-11-15 DIAGNOSIS — R11.0 NAUSEA: ICD-10-CM

## 2023-11-15 DIAGNOSIS — J02.9 SORE THROAT: Primary | ICD-10-CM

## 2023-11-15 DIAGNOSIS — E66.09 CLASS 2 OBESITY DUE TO EXCESS CALORIES WITHOUT SERIOUS COMORBIDITY WITH BODY MASS INDEX (BMI) OF 35.0 TO 35.9 IN ADULT: Chronic | ICD-10-CM

## 2023-11-15 DIAGNOSIS — B37.0 THRUSH: ICD-10-CM

## 2023-11-15 LAB
EXPIRATION DATE: NORMAL
INTERNAL CONTROL: NORMAL
Lab: 7493
S PYO AG THROAT QL: NEGATIVE

## 2023-11-15 RX ORDER — PROMETHAZINE HYDROCHLORIDE 12.5 MG/1
12.5 TABLET ORAL EVERY 6 HOURS PRN
Qty: 30 TABLET | Refills: 0 | Status: SHIPPED | OUTPATIENT
Start: 2023-11-15

## 2023-11-15 RX ORDER — LIDOCAINE HYDROCHLORIDE 20 MG/ML
5 SOLUTION OROPHARYNGEAL AS NEEDED
Qty: 100 ML | Refills: 0 | Status: SHIPPED | OUTPATIENT
Start: 2023-11-15 | End: 2023-11-22

## 2023-11-15 NOTE — ASSESSMENT & PLAN NOTE
Patient's (Body mass index is 34 kg/m².) indicates that they are obese (BMI >30) with health conditions that include dyslipidemias . Weight is unchanged. BMI  is above average; BMI management plan is completed. We discussed portion control and increasing exercise.

## 2023-11-15 NOTE — PROGRESS NOTES
"Chief Complaint  Sore Throat (Mouth and throat has white spots, x2 weeks)    Subjective        Callie Basilio presents to [facility]  History of Present Illness  Callie presents to the clinic with complaints of a sore mouth, throat, broncus, and esophagus. She has white spots in her mouth and just doesn't feel good. The symptoms started last week. She originally thought she had thrush because she sometimes gets it from her inhalers but she has been taking her fluconazole with no relief. Denies any chest pain, fever, N/V/D or new SOA.     Strep test was negative.       Objective   Vital Signs:  /80 (BP Location: Left arm, Patient Position: Sitting, Cuff Size: Adult)   Pulse 88   Temp 97.5 °F (36.4 °C) (Temporal)   Resp 18   Ht 162.6 cm (64.02\")   Wt 89.9 kg (198 lb 3.2 oz)   SpO2 98%   BMI 34.00 kg/m²   Estimated body mass index is 34 kg/m² as calculated from the following:    Height as of this encounter: 162.6 cm (64.02\").    Weight as of this encounter: 89.9 kg (198 lb 3.2 oz).               Physical Exam  Constitutional:       Appearance: Normal appearance.   HENT:      Nose: Nose normal.      Mouth/Throat:      Mouth: Mucous membranes are moist.     Cardiovascular:      Rate and Rhythm: Normal rate and regular rhythm.      Pulses: Normal pulses.      Heart sounds: Normal heart sounds.   Pulmonary:      Effort: Pulmonary effort is normal.      Breath sounds: Normal breath sounds.   Skin:     General: Skin is warm and dry.   Neurological:      General: No focal deficit present.      Mental Status: She is alert and oriented to person, place, and time.   Psychiatric:         Mood and Affect: Mood normal.         Behavior: Behavior normal.        Result Review :                   Assessment and Plan   Diagnoses and all orders for this visit:    1. Sore throat (Primary)  -     POCT rapid strep A  -     Lidocaine Viscous HCl (XYLOCAINE) 2 % solution; Take 5 mL by mouth " As Needed for Mild Pain for up to 7 days.  Dispense: 100 mL; Refill: 0    2. Nausea  -     promethazine (PHENERGAN) 12.5 MG tablet; Take 1 tablet by mouth Every 6 (Six) Hours As Needed for Nausea or Vomiting.  Dispense: 30 tablet; Refill: 0    3. Thrush  -     nystatin (MYCOSTATIN) 100,000 unit/mL suspension; Swish and swallow 5 mL 4 (Four) Times a Day.  Dispense: 200 mL; Refill: 0    4. Class 2 obesity due to excess calories without serious comorbidity with body mass index (BMI) of 35.0 to 35.9 in adult  Assessment & Plan:  Patient's (Body mass index is 34 kg/m².) indicates that they are obese (BMI >30) with health conditions that include dyslipidemias . Weight is unchanged. BMI  is above average; BMI management plan is completed. We discussed portion control and increasing exercise.                Follow Up   Return if symptoms worsen or fail to improve.  Patient was given instructions and counseling regarding her condition or for health maintenance advice. Please see specific information pulled into the AVS if appropriate.

## 2023-11-17 ENCOUNTER — APPOINTMENT (OUTPATIENT)
Dept: GENERAL RADIOLOGY | Facility: HOSPITAL | Age: 63
End: 2023-11-17
Payer: MEDICARE

## 2023-11-17 ENCOUNTER — APPOINTMENT (OUTPATIENT)
Dept: CT IMAGING | Facility: HOSPITAL | Age: 63
End: 2023-11-17
Payer: MEDICARE

## 2023-11-17 ENCOUNTER — TELEPHONE (OUTPATIENT)
Dept: FAMILY MEDICINE CLINIC | Facility: CLINIC | Age: 63
End: 2023-11-17

## 2023-11-17 ENCOUNTER — HOSPITAL ENCOUNTER (EMERGENCY)
Facility: HOSPITAL | Age: 63
Discharge: HOME | End: 2023-11-17
Attending: EMERGENCY MEDICINE
Payer: MEDICARE

## 2023-11-17 VITALS
DIASTOLIC BLOOD PRESSURE: 56 MMHG | OXYGEN SATURATION: 95 % | TEMPERATURE: 98.2 F | HEIGHT: 64 IN | RESPIRATION RATE: 23 BRPM | WEIGHT: 192.68 LBS | SYSTOLIC BLOOD PRESSURE: 116 MMHG | HEART RATE: 80 BPM | BODY MASS INDEX: 32.9 KG/M2

## 2023-11-17 DIAGNOSIS — B37.0 THRUSH: Primary | ICD-10-CM

## 2023-11-17 LAB
ALBUMIN SERPL-MCNC: 4 G/DL (ref 3.5–5.2)
ALBUMIN/GLOB SERPL: 1.3 G/DL
ALP SERPL-CCNC: 88 U/L (ref 39–117)
ALT SERPL W P-5'-P-CCNC: 23 U/L (ref 1–33)
ANION GAP SERPL CALCULATED.3IONS-SCNC: 12.6 MMOL/L (ref 5–15)
AST SERPL-CCNC: 15 U/L (ref 1–32)
BASOPHILS # BLD AUTO: 0.05 10*3/MM3 (ref 0–0.2)
BASOPHILS NFR BLD AUTO: 0.3 % (ref 0–1.5)
BILIRUB SERPL-MCNC: 0.5 MG/DL (ref 0–1.2)
BUN SERPL-MCNC: 14 MG/DL (ref 8–23)
BUN/CREAT SERPL: 14.9 (ref 7–25)
CALCIUM SPEC-SCNC: 9 MG/DL (ref 8.6–10.5)
CHLORIDE SERPL-SCNC: 101 MMOL/L (ref 98–107)
CO2 SERPL-SCNC: 24.4 MMOL/L (ref 22–29)
CREAT SERPL-MCNC: 0.94 MG/DL (ref 0.57–1)
DEPRECATED RDW RBC AUTO: 44.8 FL (ref 37–54)
EGFRCR SERPLBLD CKD-EPI 2021: 68.3 ML/MIN/1.73
EOSINOPHIL # BLD AUTO: 0 10*3/MM3 (ref 0–0.4)
EOSINOPHIL NFR BLD AUTO: 0 % (ref 0.3–6.2)
ERYTHROCYTE [DISTWIDTH] IN BLOOD BY AUTOMATED COUNT: 12.9 % (ref 12.3–15.4)
GLOBULIN UR ELPH-MCNC: 3 GM/DL
GLUCOSE SERPL-MCNC: 136 MG/DL (ref 65–99)
HCT VFR BLD AUTO: 41.4 % (ref 34–46.6)
HGB BLD-MCNC: 13.5 G/DL (ref 12–15.9)
HOLD SPECIMEN: NORMAL
HOLD SPECIMEN: NORMAL
IMM GRANULOCYTES # BLD AUTO: 0.06 10*3/MM3 (ref 0–0.05)
IMM GRANULOCYTES NFR BLD AUTO: 0.4 % (ref 0–0.5)
LYMPHOCYTES # BLD AUTO: 1.22 10*3/MM3 (ref 0.7–3.1)
LYMPHOCYTES NFR BLD AUTO: 8.3 % (ref 19.6–45.3)
MCH RBC QN AUTO: 30.8 PG (ref 26.6–33)
MCHC RBC AUTO-ENTMCNC: 32.6 G/DL (ref 31.5–35.7)
MCV RBC AUTO: 94.3 FL (ref 79–97)
MONOCYTES # BLD AUTO: 0.69 10*3/MM3 (ref 0.1–0.9)
MONOCYTES NFR BLD AUTO: 4.7 % (ref 5–12)
NEUTROPHILS NFR BLD AUTO: 12.68 10*3/MM3 (ref 1.7–7)
NEUTROPHILS NFR BLD AUTO: 86.3 % (ref 42.7–76)
NRBC BLD AUTO-RTO: 0 /100 WBC (ref 0–0.2)
NT-PROBNP SERPL-MCNC: 816.6 PG/ML (ref 0–900)
PLATELET # BLD AUTO: 198 10*3/MM3 (ref 140–450)
PMV BLD AUTO: 10.9 FL (ref 6–12)
POTASSIUM SERPL-SCNC: 3.4 MMOL/L (ref 3.5–5.2)
PROT SERPL-MCNC: 7 G/DL (ref 6–8.5)
QT INTERVAL: 362 MS
QTC INTERVAL: 487 MS
RBC # BLD AUTO: 4.39 10*6/MM3 (ref 3.77–5.28)
SODIUM SERPL-SCNC: 138 MMOL/L (ref 136–145)
TROPONIN T SERPL HS-MCNC: 9 NG/L
WBC NRBC COR # BLD AUTO: 14.7 10*3/MM3 (ref 3.4–10.8)
WHOLE BLOOD HOLD COAG: NORMAL
WHOLE BLOOD HOLD SPECIMEN: NORMAL

## 2023-11-17 PROCEDURE — 93005 ELECTROCARDIOGRAM TRACING: CPT

## 2023-11-17 PROCEDURE — 93005 ELECTROCARDIOGRAM TRACING: CPT | Performed by: EMERGENCY MEDICINE

## 2023-11-17 PROCEDURE — 71260 CT THORAX DX C+: CPT

## 2023-11-17 PROCEDURE — 99285 EMERGENCY DEPT VISIT HI MDM: CPT

## 2023-11-17 PROCEDURE — 80053 COMPREHEN METABOLIC PANEL: CPT

## 2023-11-17 PROCEDURE — 93010 ELECTROCARDIOGRAM REPORT: CPT | Performed by: INTERNAL MEDICINE

## 2023-11-17 PROCEDURE — 94640 AIRWAY INHALATION TREATMENT: CPT

## 2023-11-17 PROCEDURE — 94799 UNLISTED PULMONARY SVC/PX: CPT

## 2023-11-17 PROCEDURE — 83880 ASSAY OF NATRIURETIC PEPTIDE: CPT

## 2023-11-17 PROCEDURE — 25510000001 IOPAMIDOL PER 1 ML: Performed by: EMERGENCY MEDICINE

## 2023-11-17 PROCEDURE — 96374 THER/PROPH/DIAG INJ IV PUSH: CPT

## 2023-11-17 PROCEDURE — 71045 X-RAY EXAM CHEST 1 VIEW: CPT

## 2023-11-17 PROCEDURE — 85025 COMPLETE CBC W/AUTO DIFF WBC: CPT

## 2023-11-17 PROCEDURE — 36415 COLL VENOUS BLD VENIPUNCTURE: CPT

## 2023-11-17 PROCEDURE — 25010000002 METHYLPREDNISOLONE PER 125 MG

## 2023-11-17 PROCEDURE — 84484 ASSAY OF TROPONIN QUANT: CPT

## 2023-11-17 RX ORDER — SODIUM CHLORIDE 0.9 % (FLUSH) 0.9 %
10 SYRINGE (ML) INJECTION AS NEEDED
Status: DISCONTINUED | OUTPATIENT
Start: 2023-11-17 | End: 2023-11-18 | Stop reason: HOSPADM

## 2023-11-17 RX ORDER — FLUCONAZOLE 150 MG/1
150 TABLET ORAL WEEKLY
Qty: 3 TABLET | Refills: 0 | Status: SHIPPED | OUTPATIENT
Start: 2023-11-17

## 2023-11-17 RX ORDER — IPRATROPIUM BROMIDE AND ALBUTEROL SULFATE 2.5; .5 MG/3ML; MG/3ML
3 SOLUTION RESPIRATORY (INHALATION) ONCE
Status: COMPLETED | OUTPATIENT
Start: 2023-11-17 | End: 2023-11-17

## 2023-11-17 RX ORDER — METHYLPREDNISOLONE SODIUM SUCCINATE 125 MG/2ML
125 INJECTION, POWDER, LYOPHILIZED, FOR SOLUTION INTRAMUSCULAR; INTRAVENOUS ONCE
Status: COMPLETED | OUTPATIENT
Start: 2023-11-17 | End: 2023-11-17

## 2023-11-17 RX ORDER — AZITHROMYCIN 250 MG/1
TABLET, FILM COATED ORAL
Qty: 6 TABLET | Refills: 0 | Status: SHIPPED | OUTPATIENT
Start: 2023-11-17

## 2023-11-17 RX ADMIN — METHYLPREDNISOLONE SODIUM SUCCINATE 125 MG: 125 INJECTION INTRAMUSCULAR; INTRAVENOUS at 17:03

## 2023-11-17 RX ADMIN — IOPAMIDOL 57 ML: 755 INJECTION, SOLUTION INTRAVENOUS at 20:03

## 2023-11-17 RX ADMIN — IPRATROPIUM BROMIDE AND ALBUTEROL SULFATE 3 ML: .5; 3 SOLUTION RESPIRATORY (INHALATION) at 16:41

## 2023-11-17 NOTE — TELEPHONE ENCOUNTER
Called and spoke with Raghavendra rascon is at the ED. I apologized for taking so long for office to get back to them.

## 2023-11-17 NOTE — TELEPHONE ENCOUNTER
Caller: SIERRA HOLLEY    Relationship to patient: Emergency Contact    Best call back number: [phone]     Chief complaint: TROUBLE BREATHING, THRUSH     Patient directed to call 911 or go to their nearest emergency room.     Patient verbalized understanding: [x] Yes  [] No  If no, why?    Additional notes: PATIENT SPOUSE CALLED STATING PATIENT IS HAVING TROUBLE BREATHING, NOT ABLE TO EAT, AND SORE THROAT, SHORTNESS OF BREATH AT REST , AND UP AND MOVING, HUB REFERRED TO ED.

## 2023-11-18 NOTE — ED PROVIDER NOTES
Time: 7:16 PM EST  Date of encounter:  11/17/2023  Independent Historian/Clinical History and Information was obtained by:   Patient    History is limited by: N/A    Chief Complaint: Shortness of breath      History of Present Illness:  Patient is a 63 y.o. year old female who presents to the emergency department for evaluation of shortness of breath is gotten worse.  Patient does report a cough.  Patient denies fever and chills.  Patient does report that she has a history of thrush.  Patient denies nausea but does report decreased p.o. intake.    HPI    Patient Care Team  Primary Care Provider: Fouzia West DO    Past Medical History:     Allergies   Allergen Reactions    Adhesive Tape Other (See Comments)     Skin peels off    Amoxicillin-Pot Clavulanate Itching    Levofloxacin Swelling    Morphine Hives    Oxycodone Mental Status Change    Quinolones Swelling    Sulfa Antibiotics Unknown - Low Severity    Ambien [Zolpidem] Anxiety    Penicillins Hives and Rash     Past Medical History:   Diagnosis Date    Anxiety     Arthritis     Bladder disorder     Bowel disease     Chronic allergic rhinitis     COPD (chronic obstructive pulmonary disease)     Depression     Diverticulitis     Hyperlipidemia     Hypertension     IBS (irritable bowel syndrome)     Kidney stone     Lung disease     Nephrolithiasis 07/27/2015    Oxygen dependent     SOB (shortness of breath)      Past Surgical History:   Procedure Laterality Date    ABDOMINAL HYSTERECTOMY      APPENDECTOMY      BLADDER REPAIR      CHOLECYSTECTOMY      COLON SURGERY      multiple surgeries, wound vac    CYSTOSCOPY      AN URETEROSCOPY WITH LASER LITHOTRIPSY    HERNIA REPAIR      SHOULDER SURGERY      x 2 left     TONSILLECTOMY      URETERAL STENT INSERTION       Family History   Problem Relation Age of Onset    No Known Problems Mother     Heart disease Father     Diabetes Sister     Heart disease Sister     Cancer Sister         lung     Diabetes Brother      Heart disease Brother     Diabetes Paternal Grandmother     Diabetes Other        Home Medications:  Prior to Admission medications    Medication Sig Start Date End Date Taking? Authorizing Provider   albuterol (ACCUNEB) 1.25 MG/3ML nebulizer solution Take 3 mL by nebulization Every 4 (Four) Hours As Needed for Wheezing. 6/2/23   Fouzia West DO   albuterol sulfate  (90 Base) MCG/ACT inhaler Inhale 2 puffs Every 4 (Four) Hours As Needed for Wheezing or Shortness of Air. 2/15/23   Fouzia West DO   alendronate (FOSAMAX) 70 MG tablet Take 1 tablet by mouth Every 7 (Seven) Days. 8/4/23   Fouzia West DO   aspirin 81 MG EC tablet Take 1 tablet by mouth Daily. 8/4/23   Fouzia West DO   atorvastatin (LIPITOR) 20 MG tablet Take 1 tablet by mouth Every Night. 8/4/23   Fouzia West DO   Breztri Aerosphere 160-9-4.8 MCG/ACT aerosol inhaler Inhale 2 puffs 2 (Two) Times a Day. 2/6/23   Fouzia West DO   budesonide (PULMICORT) 0.5 MG/2ML nebulizer solution Take 2 mL by nebulization Daily. 8/4/23   Fouzia West DO   cefdinir (OMNICEF) 300 MG capsule Take 1 capsule by mouth 2 (Two) Times a Day.  Patient not taking: Reported on 11/15/2023 9/17/23   Fouzia West DO   cetirizine (zyrTEC) 10 MG tablet Take 1 tablet by mouth Daily. 8/4/23   Fouzia West DO   clonazePAM (KlonoPIN) 0.5 MG tablet Take 2 tablets by mouth 2 (Two) Times a Day. One in the am and half in the pm, half a bed time. 8/24/22   Provider, MD Bertram   cyclobenzaprine (FLEXERIL) 10 MG tablet TAKE 1 TABLET BY MOUTH THREE TIMES DAILY AS NEEDED FOR MUSCLE SPASMS 10/30/23   Fouzia West DO   diclofenac (VOLTAREN) 75 MG EC tablet Take 1 tablet by mouth 2 (Two) Times a Day. 8/4/23   Fouzia West DO   Diclofenac Sodium 3 % gel gel Apply 3.2 g topically to the appropriate area as directed 5 (Five) Times a Day As Needed (PAIN). for 60 days. 11/17/22   Fouzia West,    fluconazole (DIFLUCAN) 150 MG tablet Take when symptoms of yeast  infection develop. Can repeat in 5 days. 9/17/23   Fouzia West DO   fluticasone (FLONASE) 50 MCG/ACT nasal spray 1 spray by Each Nare route 2 (Two) Times a Day. 6/18/21   ProviderBertram MD   furosemide (LASIX) 40 MG tablet Take 0.5 tablets by mouth 2 (Two) Times a Day. 8/4/23   Fouzia West DO   guaiFENesin (Mucinex) 600 MG 12 hr tablet Take 2 tablets by mouth 2 (Two) Times a Day. 11/29/22   Fouzia West DO   HYDROcod Polst-CPM Polst ER (TUSSIONEX PENNKINETIC) 10-8 MG/5ML ER suspension TAKE 1 TEASPOONFUL BY MOUTH TWICE DAILY AS NEEDED  Patient taking differently: Take 5 mL by mouth Every 12 (Twelve) Hours As Needed. 4/26/22   Fouzia West DO   HYDROcodone-acetaminophen (NORCO) 7.5-325 MG per tablet Take 1 tablet by mouth Every 6 (Six) Hours As Needed for Moderate Pain. 10/12/23   Fouzia West DO   ipratropium-albuterol (DUO-NEB) 0.5-2.5 mg/3 ml nebulizer Take 3 mL by nebulization 4 (Four) Times a Day. 6/3/22   Bassem Azevedo MD   Lidocaine Viscous HCl (XYLOCAINE) 2 % solution Take 5 mL by mouth As Needed for Mild Pain for up to 7 days. 11/15/23 11/22/23  Susana Aranda APRN   losartan (COZAAR) 25 MG tablet TAKE 1 TABLET BY MOUTH ONCE DAILY FOR BLOOD PRESSURE 7/13/23   Fouzia West DO   magnesium oxide (MAG-OX) 400 MG tablet Take 1 tablet by mouth Daily. 2/6/23   Fouzia West DO   montelukast (SINGULAIR) 10 MG tablet Take 1 tablet qhs 8/4/23   Fouzia West DO   nystatin (MYCOSTATIN) 100,000 unit/mL suspension Swish and swallow 5 mL 4 (Four) Times a Day. 11/15/23   Susana Aranda APRN   promethazine (PHENERGAN) 12.5 MG tablet Take 1 tablet by mouth Every 6 (Six) Hours As Needed for Nausea or Vomiting. 11/15/23   Susana Aranda APRN   Qnasl 80 MCG/ACT aerosol solution USE ONE SPRAY INTO NOSTRIL(S) AS DIRECTED BY PROVIDER 6/27/22   Fouzia West DO   QUEtiapine fumarate ER (SEROquel XR) 50 MG tablet sustained-release 24 hour tablet  8/12/22   Provider, MD Bertram    QUEtiapine XR (SEROquel XR) 300 MG 24 hr tablet  22   ProviderBertram MD   roflumilast (DALIRESP) 500 MCG tablet tablet Take 1 tablet by mouth Daily. 23   Fouzia West DO   tamsulosin (FLOMAX) 0.4 MG capsule 24 hr capsule Take 1 capsule by mouth Daily. 9/15/23   Fouzia West DO   vitamin B-12 (CYANOCOBALAMIN) 100 MCG tablet Take 0.5 tablets by mouth Daily. 23   Fouzia West DO   vitamin D (ERGOCALCIFEROL) 1.25 MG (37997 UT) capsule capsule TAKE 1 CAPSULE BY MOUTH 1 (ONE) TIME PER WEEK. 23   Fouzia West DO   Vortioxetine HBr (TRINTELLIX) 10 MG tablet tablet Take 1 tablet by mouth Daily With Breakfast.    Provider, MD Bertram        Social History:   Social History     Tobacco Use    Smoking status: Former     Packs/day: 1.00     Years: 30.00     Additional pack years: 0.00     Total pack years: 30.00     Types: Cigarettes     Start date: 1980     Quit date: 2023     Years since quittin.8     Passive exposure: Past    Smokeless tobacco: Never    Tobacco comments:     no second hand smoke exposure. Has not somked for over 2 weeks   Vaping Use    Vaping Use: Never used   Substance Use Topics    Alcohol use: Never    Drug use: Never         Review of Systems:  Review of Systems   Constitutional:  Negative for chills and fever.   HENT:  Negative for congestion, rhinorrhea and sore throat.    Eyes:  Negative for pain and visual disturbance.   Respiratory:  Positive for cough and shortness of breath. Negative for apnea and chest tightness.    Cardiovascular:  Negative for chest pain and palpitations.   Gastrointestinal:  Negative for abdominal pain, diarrhea, nausea and vomiting.   Genitourinary:  Negative for difficulty urinating and dysuria.   Musculoskeletal:  Negative for joint swelling and myalgias.   Skin:  Negative for color change.   Neurological:  Negative for seizures and headaches.   Psychiatric/Behavioral: Negative.     All other systems reviewed and are  "negative.       Physical Exam:  /56   Pulse 80   Temp 98.2 °F (36.8 °C) (Oral)   Resp 23   Ht 162.6 cm (64\")   Wt 87.4 kg (192 lb 10.9 oz)   SpO2 95%   BMI 33.07 kg/m²     Physical Exam  Vitals and nursing note reviewed.   Constitutional:       General: She is not in acute distress.     Appearance: Normal appearance. She is not toxic-appearing.   HENT:      Head: Normocephalic and atraumatic.      Jaw: There is normal jaw occlusion.   Eyes:      General: Lids are normal.      Extraocular Movements: Extraocular movements intact.      Conjunctiva/sclera: Conjunctivae normal.      Pupils: Pupils are equal, round, and reactive to light.   Cardiovascular:      Rate and Rhythm: Normal rate and regular rhythm.      Pulses: Normal pulses.      Heart sounds: Normal heart sounds.   Pulmonary:      Effort: Pulmonary effort is normal. No respiratory distress.      Breath sounds: Normal breath sounds. No wheezing or rhonchi.   Abdominal:      General: Abdomen is flat.      Palpations: Abdomen is soft.      Tenderness: There is no abdominal tenderness. There is no guarding or rebound.   Musculoskeletal:         General: Normal range of motion.      Cervical back: Normal range of motion and neck supple.      Right lower leg: No edema.      Left lower leg: No edema.   Skin:     General: Skin is warm and dry.   Neurological:      Mental Status: She is alert and oriented to person, place, and time. Mental status is at baseline.   Psychiatric:         Mood and Affect: Mood normal.                  Procedures:  Procedures      Medical Decision Making:      Comorbidities that affect care:    COPD    External Notes reviewed:    Previous Clinic Note: Patient was seen in clinic for sore throat 2 days ago.      The following orders were placed and all results were independently analyzed by me:  Orders Placed This Encounter   Procedures    XR Chest 1 View    CT Chest With Contrast Diagnostic    Penrose Draw    Comprehensive " Metabolic Panel    BNP    Single High Sensitivity Troponin T    CBC Auto Differential    NPO Diet NPO Type: Strict NPO    Undress & Gown    Continuous Pulse Oximetry    Vital Signs    Oxygen Therapy- Nasal Cannula; Titrate 1-6 LPM Per SpO2; 90 - 95%    ECG 12 Lead ED Triage Standing Order; SOA    Insert Peripheral IV    CBC & Differential    Green Top (Gel)    Lavender Top    Gold Top - SST    Light Blue Top       Medications Given in the Emergency Department:  Medications   sodium chloride 0.9 % flush 10 mL (has no administration in time range)   ipratropium-albuterol (DUO-NEB) nebulizer solution 3 mL (3 mL Nebulization Given 11/17/23 1641)   methylPREDNISolone sodium succinate (SOLU-Medrol) injection 125 mg (125 mg Intravenous Given 11/17/23 1703)   iopamidol (ISOVUE-370) 76 % injection 100 mL (57 mL Intravenous Given 11/17/23 2003)        ED Course:    ED Course as of 11/17/23 2209 Fri Nov 17, 2023 2147 EKG:    Rhythm: sinus  Rate: 109  Axis: normal  Intervals: normal  ST Segment: no elevations      Interpreted by me   [BN]      ED Course User Index  [BN] Karol Boyce MD       Labs:    Lab Results (last 24 hours)       Procedure Component Value Units Date/Time    CBC & Differential [[id number]]  (Abnormal) Collected: 11/17/23 1613    Specimen: Blood from Arm, Right Updated: 11/17/23 1646    Narrative:      The following orders were created for panel order CBC & Differential.  Procedure                               Abnormality         Status                     ---------                               -----------         ------                     CBC Auto Differential[[id number]]        Abnormal            Final result                 Please view results for these tests on the individual orders.    Comprehensive Metabolic Panel [[id number]]  (Abnormal) Collected: 11/17/23 1613    Specimen: Blood from Arm, Right Updated: 11/17/23 1715     Glucose 136 mg/dL      BUN 14 mg/dL      Creatinine 0.94 mg/dL       Sodium 138 mmol/L      Potassium 3.4 mmol/L      Chloride 101 mmol/L      CO2 24.4 mmol/L      Calcium 9.0 mg/dL      Total Protein 7.0 g/dL      Albumin 4.0 g/dL      ALT (SGPT) 23 U/L      AST (SGOT) 15 U/L      Alkaline Phosphatase 88 U/L      Total Bilirubin 0.5 mg/dL      Globulin 3.0 gm/dL      A/G Ratio 1.3 g/dL      BUN/Creatinine Ratio 14.9     Anion Gap 12.6 mmol/L      eGFR 68.3 mL/min/1.73     Narrative:      GFR Normal >60  Chronic Kidney Disease <60  Kidney Failure <15      BNP [[id number]]  (Normal) Collected: 11/17/23 1613    Specimen: Blood from Arm, Right Updated: 11/17/23 1707     proBNP 816.6 pg/mL     Narrative:      This assay is used as an aid in the diagnosis of individuals suspected of having heart failure. It can be used as an aid in the diagnosis of acute decompensated heart failure (ADHF) in patients presenting with signs and symptoms of ADHF to the emergency department (ED). In addition, NT-proBNP of <300 pg/mL indicates ADHF is not likely.    Age Range Result Interpretation  NT-proBNP Concentration (pg/mL:      <50             Positive            >450                   Gray                 300-450                    Negative             <300    50-75           Positive            >900                  Gray                300-900                  Negative            <300      >75             Positive            >1800                  Gray                300-1800                  Negative            <300    Single High Sensitivity Troponin T [[id number]]  (Normal) Collected: 11/17/23 1613    Specimen: Blood from Arm, Right Updated: 11/17/23 1714     HS Troponin T 9 ng/L     Narrative:      High Sensitive Troponin T Reference Range:  <14.0 ng/L- Negative Female for AMI  <22.0 ng/L- Negative Male for AMI  >=14 - Abnormal Female indicating possible myocardial injury.  >=22 - Abnormal Male indicating possible myocardial injury.   Clinicians would have to utilize clinical acumen, EKG, Troponin,  and serial changes to determine if it is an Acute Myocardial Infarction or myocardial injury due to an underlying chronic condition.         CBC Auto Differential [[id number]]  (Abnormal) Collected: 11/17/23 1613    Specimen: Blood from Arm, Right Updated: 11/17/23 1646     WBC 14.70 10*3/mm3      RBC 4.39 10*6/mm3      Hemoglobin 13.5 g/dL      Hematocrit 41.4 %      MCV 94.3 fL      MCH 30.8 pg      MCHC 32.6 g/dL      RDW 12.9 %      RDW-SD 44.8 fl      MPV 10.9 fL      Platelets 198 10*3/mm3      Neutrophil % 86.3 %      Lymphocyte % 8.3 %      Monocyte % 4.7 %      Eosinophil % 0.0 %      Basophil % 0.3 %      Immature Grans % 0.4 %      Neutrophils, Absolute 12.68 10*3/mm3      Lymphocytes, Absolute 1.22 10*3/mm3      Monocytes, Absolute 0.69 10*3/mm3      Eosinophils, Absolute 0.00 10*3/mm3      Basophils, Absolute 0.05 10*3/mm3      Immature Grans, Absolute 0.06 10*3/mm3      nRBC 0.0 /100 WBC              Imaging:    CT Chest With Contrast Diagnostic    Result Date: 11/17/2023  PROCEDURE: CT CHEST W CONTRAST DIAGNOSTIC  COMPARISONS: 11/17/2023, 1632 hours ([facility] 5/31/2022.  INDICATIONS: SHORTNESS OF BREATH X 2 WEEKS AND THRUSH X 3WEEKS.  TECHNIQUE: After obtaining the patient's consent, 800 CT/CTA images were obtained with non-ionic intravenous contrast material.   PROTOCOL:   Pulmonary embolism CTA imaging protocol performed.    RADIATION:   Total DLP: 286 mGy*cm.   Automated exposure control was utilized to minimize radiation dose. CONTRAST: 57 mL Isovue 370 I.V. LABS:   eGFR: 68.3 mL/min/1.73m^2.  FINDINGS: No pulmonary embolism is seen.  There is a small pericardial effusion, similar to the prior study.  No cardiac enlargement.  No pleural effusion.  The central tracheobronchial tree is well aerated without filling defect.  Emphysematous changes involve the lungs, especially centrilobular.  Probably no acute infiltrate is seen.  There may be mild subsegmental atelectasis and/or  fibrosis in the lung bases.  Also, linear fibrosis is suggested in the upper to mid lung zones.  The previously seen bilateral infiltrates, as on the 5/31/2022 CT study, have resolved.  Chronic calcified granulomatous disease involves the chest and the abdomen.  No pneumothorax is suspected.  No pneumomediastinum.  There are atherosclerotic changes of the thoracic aorta and the great arteries as well as the coronary arteries.  Atherosclerotic changes also involve the origin of the celiac trunk and to a lesser extent the superior mesenteric artery (SMA) origin.  Hemodynamically significant stenoses at these levels cannot be excluded.  There is diffuse hepatic steatosis.  Renal cortical scarring is suggested.  Mild nonspecific nodularity involves the left adrenal gland, seen previously.  No right adrenal nodularity.  The patient has undergone cholecystectomy.  No definite acute findings are seen in the partially imaged upper abdomen.  No thoracic aortic aneurysm or dissection is suggested.  There are degenerative changes of the imaged spine.  The other incidental nonemergent chronic findings are as described in the prior exam report from 5/31/2022.  No other acute findings are seen.        No pulmonary embolism.  No acute infiltrate.  Please see above comments for further detail.     Please note that portions of this note were completed with a voice recognition program.  MELBA GALICIA JR, MD       Electronically Signed and Approved By: MELBA GALICIA JR, MD on 11/17/2023 at 21:19             XR Chest 1 View    Result Date: 11/17/2023  PROCEDURE: XR CHEST 1 VW  COMPARISON: [facility], CR, XR CHEST 1 VW, 5/30/2022, 20:06.  [facility], CT, CT CHEST W CONTRAST DIAGNOSTIC, 5/31/2022, 1:48.  INDICATIONS: SOA  FINDINGS:  Mild irregular density is noted in the left lung base.  There is blunting of the right lateral costophrenic angle, a chronic finding.  The lungs are otherwise clear.  The cardiac  and mediastinal silhouettes appear normal.        1. Suspected scarring or atelectasis in the left lung base 2. Chronic pleural thickening in the right lateral costophrenic angle 3. No acute infiltrate identified       Shayne Faust M.D.       Electronically Signed and Approved By: Shayne Faust M.D. on 11/17/2023 at 17:17                Differential Diagnosis and Discussion:    Dyspnea: Differential diagnosis includes but is not limited to metabolic acidosis, neurological disorders, psychogenic, asthma, pneumothorax, upper airway obstruction, COPD, pneumonia, noncardiogenic pulmonary edema, interstitial lung disease, anemia, congestive heart failure, and pulmonary embolism    All labs were reviewed and interpreted by me.  CT scan radiology impression was interpreted by me.    MDM     Amount and/or Complexity of Data Reviewed  Clinical lab tests: reviewed  Tests in the radiology section of CPT®: reviewed  Tests in the medicine section of CPT®: reviewed       The patient´s CBC that was reviewed and interpreted by me shows no abnormalities of critical concern. Of note, there is no anemia requiring a blood transfusion and the platelet count is acceptable.  The patient´s CMP that was reviewed and interpretted by me shows no abnormalities of critical concern. Of note, the patient´s sodium and potassium are acceptable. The patient´s liver enzymes are unremarkable. The patient´s renal function (creatinine) is preserved. The patient has a normal anion gap.  CT chest is negative for PE.  The patient presented with a productive cough. The patient is well-appearing and in no respiratory distress. The patient has a normal mental status and is neurologically intact. The patient appears well and is able to tolerate food for fluid by mouth and there's no significant dehydration. There is no respiratory distress and no signs of systemic toxicity. The history, exam, diagnostic testing and current condition do not demonstrate an  infectious process such as meningitis, severe pneumonia, retropharyngeal abscess, epiglottitis, sepsis or other serious bacterial infection requiring further testing, treatment, consultation, or admission at this time. The patient has no additional oxygen requirement nor are there any signs of respiratory distress. The patient has a chest x-ray interpreted by me that is negative for pneumonia. Asthma, URI, GERD are also considered. The vital signs have been stable and the patient has had no hypoxia. The patient's condition is stable and appropriate for discharge. The patient will pursue further outpatient evaluation with the primary care physician, or designated physician. The patient will expressed a clear and thorough understanding and agreed to follow-up as instructed.          Patient Care Considerations:    SEPSIS was considered but is NOT present in the emergency department as SIRS criteria is not present.      Consultants/Shared Management Plan:    None    Social Determinants of Health:    Patient is independent, reliable, and has access to care.       Disposition and Care Coordination:    Discharged: I considered escalation of care by admitting this patient for observation, however the patient has improved and is suitable and  stable for discharge.    I have explained the patient´s condition, diagnoses and treatment plan based on the information available to me at this time. I have answered questions and addressed any concerns. The patient has a good  understanding of the patient´s diagnosis, condition, and treatment plan as can be expected at this point. The vital signs have been stable. The patient´s condition is stable and appropriate for discharge from the emergency department.      The patient will pursue further outpatient evaluation with the primary care physician or other designated or consulting physician as outlined in the discharge instructions. They are agreeable to this plan of care and  follow-up instructions have been explained in detail. The patient has received these instructions in written format and have expressed an understanding of the discharge instructions. The patient is aware that any significant change in condition or worsening of symptoms should prompt an immediate return to this or the closest emergency department or call to 911.  I have explained discharge medications and the need for follow up with the patient/caretakers. This was also printed in the discharge instructions. Patient was discharged with the following medications and follow up:      Medication List        New Prescriptions      azithromycin 250 MG tablet  Commonly known as: Zithromax Z-Jose  Take 2 tablets by mouth on day 1, then 1 tablet daily on days 2-5            Changed      fluconazole 150 MG tablet  Commonly known as: DIFLUCAN  Take 1 tablet by mouth 1 (One) Time Per Week.  What changed:   how much to take  how to take this  when to take this  additional instructions     HYDROcod Polst-CPM Polst ER 10-8 MG/5ML ER suspension  Commonly known as: TUSSIONEX PENNKINETIC  TAKE 1 TEASPOONFUL BY MOUTH TWICE DAILY AS NEEDED  What changed: See the new instructions.               Where to Get Your Medications        These medications were sent to [facility] - [address] - [phone]  - [phone]   [address] [facility] [address]      Phone: [phone]   azithromycin 250 MG tablet  fluconazole 150 MG tablet      Fouzia West DO  [address]     [place] [id number] [phone]             Final diagnoses:   Thrush        ED Disposition       ED Disposition   Discharge    Condition   Stable    Comment   --               This medical record created using voice recognition software.             Karol Boyce MD  11/17/23 3558

## 2023-11-26 ENCOUNTER — HOSPITAL ENCOUNTER (EMERGENCY)
Facility: HOSPITAL | Age: 63
Discharge: HOME | End: 2023-11-26
Attending: EMERGENCY MEDICINE | Admitting: EMERGENCY MEDICINE
Payer: MEDICARE

## 2023-11-26 ENCOUNTER — APPOINTMENT (OUTPATIENT)
Dept: GENERAL RADIOLOGY | Facility: HOSPITAL | Age: 63
End: 2023-11-26
Payer: MEDICARE

## 2023-11-26 VITALS
HEART RATE: 110 BPM | HEIGHT: 64 IN | OXYGEN SATURATION: 97 % | BODY MASS INDEX: 33.61 KG/M2 | RESPIRATION RATE: 24 BRPM | SYSTOLIC BLOOD PRESSURE: 105 MMHG | TEMPERATURE: 98.1 F | DIASTOLIC BLOOD PRESSURE: 90 MMHG | WEIGHT: 196.87 LBS

## 2023-11-26 DIAGNOSIS — J44.1 COPD EXACERBATION: ICD-10-CM

## 2023-11-26 DIAGNOSIS — K20.90 ESOPHAGITIS: ICD-10-CM

## 2023-11-26 DIAGNOSIS — J20.9 ACUTE BRONCHITIS, UNSPECIFIED ORGANISM: Primary | ICD-10-CM

## 2023-11-26 LAB
ALBUMIN SERPL-MCNC: 3.8 G/DL (ref 3.5–5.2)
ALBUMIN/GLOB SERPL: 1.2 G/DL
ALP SERPL-CCNC: 98 U/L (ref 39–117)
ALT SERPL W P-5'-P-CCNC: 24 U/L (ref 1–33)
ANION GAP SERPL CALCULATED.3IONS-SCNC: 9.3 MMOL/L (ref 5–15)
AST SERPL-CCNC: 13 U/L (ref 1–32)
BASOPHILS # BLD AUTO: 0.03 10*3/MM3 (ref 0–0.2)
BASOPHILS NFR BLD AUTO: 0.4 % (ref 0–1.5)
BILIRUB SERPL-MCNC: 0.3 MG/DL (ref 0–1.2)
BUN SERPL-MCNC: 7 MG/DL (ref 8–23)
BUN/CREAT SERPL: 8.9 (ref 7–25)
CALCIUM SPEC-SCNC: 9.1 MG/DL (ref 8.6–10.5)
CHLORIDE SERPL-SCNC: 102 MMOL/L (ref 98–107)
CO2 SERPL-SCNC: 28.7 MMOL/L (ref 22–29)
CREAT SERPL-MCNC: 0.79 MG/DL (ref 0.57–1)
D-LACTATE SERPL-SCNC: 1.7 MMOL/L (ref 0.5–2)
DEPRECATED RDW RBC AUTO: 44.2 FL (ref 37–54)
EGFRCR SERPLBLD CKD-EPI 2021: 84.2 ML/MIN/1.73
EOSINOPHIL # BLD AUTO: 0.14 10*3/MM3 (ref 0–0.4)
EOSINOPHIL NFR BLD AUTO: 1.8 % (ref 0.3–6.2)
ERYTHROCYTE [DISTWIDTH] IN BLOOD BY AUTOMATED COUNT: 12.7 % (ref 12.3–15.4)
GLOBULIN UR ELPH-MCNC: 3.2 GM/DL
GLUCOSE SERPL-MCNC: 109 MG/DL (ref 65–99)
HCT VFR BLD AUTO: 38.3 % (ref 34–46.6)
HGB BLD-MCNC: 12.4 G/DL (ref 12–15.9)
HOLD SPECIMEN: NORMAL
HOLD SPECIMEN: NORMAL
IMM GRANULOCYTES # BLD AUTO: 0.15 10*3/MM3 (ref 0–0.05)
IMM GRANULOCYTES NFR BLD AUTO: 1.9 % (ref 0–0.5)
LYMPHOCYTES # BLD AUTO: 1.12 10*3/MM3 (ref 0.7–3.1)
LYMPHOCYTES NFR BLD AUTO: 14.1 % (ref 19.6–45.3)
MCH RBC QN AUTO: 30.7 PG (ref 26.6–33)
MCHC RBC AUTO-ENTMCNC: 32.4 G/DL (ref 31.5–35.7)
MCV RBC AUTO: 94.8 FL (ref 79–97)
MONOCYTES # BLD AUTO: 0.7 10*3/MM3 (ref 0.1–0.9)
MONOCYTES NFR BLD AUTO: 8.8 % (ref 5–12)
NEUTROPHILS NFR BLD AUTO: 5.81 10*3/MM3 (ref 1.7–7)
NEUTROPHILS NFR BLD AUTO: 73 % (ref 42.7–76)
NRBC BLD AUTO-RTO: 0 /100 WBC (ref 0–0.2)
NT-PROBNP SERPL-MCNC: 145.8 PG/ML (ref 0–900)
PLATELET # BLD AUTO: 272 10*3/MM3 (ref 140–450)
PMV BLD AUTO: 9.8 FL (ref 6–12)
POTASSIUM SERPL-SCNC: 3.9 MMOL/L (ref 3.5–5.2)
PROT SERPL-MCNC: 7 G/DL (ref 6–8.5)
RBC # BLD AUTO: 4.04 10*6/MM3 (ref 3.77–5.28)
SODIUM SERPL-SCNC: 140 MMOL/L (ref 136–145)
TROPONIN T SERPL HS-MCNC: 7 NG/L
WBC NRBC COR # BLD AUTO: 7.95 10*3/MM3 (ref 3.4–10.8)
WHOLE BLOOD HOLD COAG: NORMAL
WHOLE BLOOD HOLD SPECIMEN: NORMAL

## 2023-11-26 PROCEDURE — 36415 COLL VENOUS BLD VENIPUNCTURE: CPT

## 2023-11-26 PROCEDURE — 96375 TX/PRO/DX INJ NEW DRUG ADDON: CPT

## 2023-11-26 PROCEDURE — 94799 UNLISTED PULMONARY SVC/PX: CPT

## 2023-11-26 PROCEDURE — 96367 TX/PROPH/DG ADDL SEQ IV INF: CPT

## 2023-11-26 PROCEDURE — 85025 COMPLETE CBC W/AUTO DIFF WBC: CPT | Performed by: EMERGENCY MEDICINE

## 2023-11-26 PROCEDURE — 25010000002 PROCHLORPERAZINE 10 MG/2ML SOLUTION: Performed by: EMERGENCY MEDICINE

## 2023-11-26 PROCEDURE — 80053 COMPREHEN METABOLIC PANEL: CPT | Performed by: EMERGENCY MEDICINE

## 2023-11-26 PROCEDURE — 25810000003 SODIUM CHLORIDE 0.9 % SOLUTION: Performed by: EMERGENCY MEDICINE

## 2023-11-26 PROCEDURE — 83605 ASSAY OF LACTIC ACID: CPT | Performed by: EMERGENCY MEDICINE

## 2023-11-26 PROCEDURE — 93005 ELECTROCARDIOGRAM TRACING: CPT

## 2023-11-26 PROCEDURE — 96365 THER/PROPH/DIAG IV INF INIT: CPT

## 2023-11-26 PROCEDURE — 25010000002 AZITHROMYCIN PER 500 MG: Performed by: EMERGENCY MEDICINE

## 2023-11-26 PROCEDURE — 87040 BLOOD CULTURE FOR BACTERIA: CPT | Performed by: EMERGENCY MEDICINE

## 2023-11-26 PROCEDURE — 71045 X-RAY EXAM CHEST 1 VIEW: CPT

## 2023-11-26 PROCEDURE — 25010000002 CEFTRIAXONE PER 250 MG: Performed by: EMERGENCY MEDICINE

## 2023-11-26 PROCEDURE — 83880 ASSAY OF NATRIURETIC PEPTIDE: CPT | Performed by: EMERGENCY MEDICINE

## 2023-11-26 PROCEDURE — 94640 AIRWAY INHALATION TREATMENT: CPT

## 2023-11-26 PROCEDURE — 84484 ASSAY OF TROPONIN QUANT: CPT | Performed by: EMERGENCY MEDICINE

## 2023-11-26 PROCEDURE — 99284 EMERGENCY DEPT VISIT MOD MDM: CPT

## 2023-11-26 PROCEDURE — 25010000002 METHYLPREDNISOLONE PER 125 MG: Performed by: EMERGENCY MEDICINE

## 2023-11-26 PROCEDURE — 93005 ELECTROCARDIOGRAM TRACING: CPT | Performed by: EMERGENCY MEDICINE

## 2023-11-26 RX ORDER — PROCHLORPERAZINE EDISYLATE 5 MG/ML
10 INJECTION INTRAMUSCULAR; INTRAVENOUS ONCE
Status: COMPLETED | OUTPATIENT
Start: 2023-11-26 | End: 2023-11-26

## 2023-11-26 RX ORDER — METHYLPREDNISOLONE SODIUM SUCCINATE 125 MG/2ML
125 INJECTION, POWDER, LYOPHILIZED, FOR SOLUTION INTRAMUSCULAR; INTRAVENOUS ONCE
Status: COMPLETED | OUTPATIENT
Start: 2023-11-26 | End: 2023-11-26

## 2023-11-26 RX ORDER — PANTOPRAZOLE SODIUM 40 MG/1
40 TABLET, DELAYED RELEASE ORAL DAILY
Qty: 30 TABLET | Refills: 0 | Status: SHIPPED | OUTPATIENT
Start: 2023-11-26

## 2023-11-26 RX ORDER — SUCRALFATE ORAL 1 G/10ML
1 SUSPENSION ORAL 3 TIMES DAILY
Qty: 900 ML | Refills: 0 | Status: SHIPPED | OUTPATIENT
Start: 2023-11-26 | End: 2023-12-26

## 2023-11-26 RX ORDER — IPRATROPIUM BROMIDE AND ALBUTEROL SULFATE 2.5; .5 MG/3ML; MG/3ML
6 SOLUTION RESPIRATORY (INHALATION) ONCE
Status: COMPLETED | OUTPATIENT
Start: 2023-11-26 | End: 2023-11-26

## 2023-11-26 RX ORDER — SODIUM CHLORIDE 0.9 % (FLUSH) 0.9 %
10 SYRINGE (ML) INJECTION AS NEEDED
Status: DISCONTINUED | OUTPATIENT
Start: 2023-11-26 | End: 2023-11-26 | Stop reason: HOSPADM

## 2023-11-26 RX ORDER — DOXYCYCLINE 100 MG/1
100 CAPSULE ORAL 2 TIMES DAILY
Qty: 20 CAPSULE | Refills: 0 | Status: SHIPPED | OUTPATIENT
Start: 2023-11-26

## 2023-11-26 RX ORDER — ALUMINA, MAGNESIA, AND SIMETHICONE 2400; 2400; 240 MG/30ML; MG/30ML; MG/30ML
15 SUSPENSION ORAL ONCE
Status: COMPLETED | OUTPATIENT
Start: 2023-11-26 | End: 2023-11-26

## 2023-11-26 RX ADMIN — ALUMINUM HYDROXIDE, MAGNESIUM HYDROXIDE, AND DIMETHICONE 15 ML: 400; 400; 40 SUSPENSION ORAL at 15:38

## 2023-11-26 RX ADMIN — IPRATROPIUM BROMIDE AND ALBUTEROL SULFATE 6 ML: .5; 3 SOLUTION RESPIRATORY (INHALATION) at 14:54

## 2023-11-26 RX ADMIN — AZITHROMYCIN 500 MG: 500 INJECTION, POWDER, LYOPHILIZED, FOR SOLUTION INTRAVENOUS at 16:29

## 2023-11-26 RX ADMIN — PROCHLORPERAZINE EDISYLATE 10 MG: 5 INJECTION INTRAMUSCULAR; INTRAVENOUS at 16:49

## 2023-11-26 RX ADMIN — METHYLPREDNISOLONE SODIUM SUCCINATE 125 MG: 125 INJECTION INTRAMUSCULAR; INTRAVENOUS at 15:38

## 2023-11-26 NOTE — ED PROVIDER NOTES
Time: 4:29 PM EST  Date of encounter:  11/26/2023  Independent Historian/Clinical History and Information was obtained by:   Patient and Family  Chief Complaint: Shortness of breath and pain with swallowing    History is limited by: N/A    History of Present Illness:  Patient is a 63 y.o. year old female who presents to the emergency department for evaluation of difficulty breathing.  Patient states that she is short of breath and coughing.  Patient reports a history of COPD but states that her breathing has gotten worse over the last couple weeks.  Patient reports in the last 2 days she started coughing up thick green mucus.  Patient states that her chest hurts when she coughs.  Patient states that she is more short of breath than normal.  Patient normally wears oxygen at night but states she has used the last 2 days during the daytime as well.  Patient states that she feels like she is getting enough air in.  Additionally patient complains of a sore throat and mouth sores.  Patient reports it is very painful for her to swallow.  Patient states that she has been treated for thrush with both nystatin and Diflucan but states that her throat pain is not getting better.    HPI    Patient Care Team  Primary Care Provider: Fouzia West DO    Past Medical History:     Allergies   Allergen Reactions    Adhesive Tape Other (See Comments)     Skin peels off    Amoxicillin-Pot Clavulanate Itching    Levofloxacin Swelling    Morphine Hives    Oxycodone Mental Status Change    Quinolones Swelling    Sulfa Antibiotics Unknown - Low Severity    Ambien [Zolpidem] Anxiety    Penicillins Hives and Rash     Past Medical History:   Diagnosis Date    Anxiety     Arthritis     Bladder disorder     Bowel disease     Chronic allergic rhinitis     COPD (chronic obstructive pulmonary disease)     Depression     Diverticulitis     Hyperlipidemia     Hypertension     IBS (irritable bowel syndrome)     Kidney stone     Lung disease      Nephrolithiasis 07/27/2015    Oxygen dependent     SOB (shortness of breath)      Past Surgical History:   Procedure Laterality Date    ABDOMINAL HYSTERECTOMY      APPENDECTOMY      BLADDER REPAIR      CHOLECYSTECTOMY      COLON SURGERY      multiple surgeries, wound vac    CYSTOSCOPY      AN URETEROSCOPY WITH LASER LITHOTRIPSY    HERNIA REPAIR      SHOULDER SURGERY      x 2 left     TONSILLECTOMY      URETERAL STENT INSERTION       Family History   Problem Relation Age of Onset    No Known Problems Mother     Heart disease Father     Diabetes Sister     Heart disease Sister     Cancer Sister         lung     Diabetes Brother     Heart disease Brother     Diabetes Paternal Grandmother     Diabetes Other        Home Medications:  Prior to Admission medications    Medication Sig Start Date End Date Taking? Authorizing Provider   albuterol (ACCUNEB) 1.25 MG/3ML nebulizer solution Take 3 mL by nebulization Every 4 (Four) Hours As Needed for Wheezing. 6/2/23   Fouzia West DO   albuterol sulfate  (90 Base) MCG/ACT inhaler Inhale 2 puffs Every 4 (Four) Hours As Needed for Wheezing or Shortness of Air. 2/15/23   Fouzia West DO   alendronate (FOSAMAX) 70 MG tablet Take 1 tablet by mouth Every 7 (Seven) Days. 8/4/23   Fouzia West DO   aspirin 81 MG EC tablet Take 1 tablet by mouth Daily. 8/4/23   Fouzia West DO   atorvastatin (LIPITOR) 20 MG tablet Take 1 tablet by mouth Every Night. 8/4/23   Fouzia West DO   azithromycin (Zithromax Z-Jose) 250 MG tablet Take 2 tablets by mouth on day 1, then 1 tablet daily on days 2-5 11/17/23   Karol Boyce MD Breztri Aerosphere 160-9-4.8 MCG/ACT aerosol inhaler Inhale 2 puffs 2 (Two) Times a Day. 2/6/23   Fouzia West DO   budesonide (PULMICORT) 0.5 MG/2ML nebulizer solution Take 2 mL by nebulization Daily. 8/4/23   Fouzia West DO   cefdinir (OMNICEF) 300 MG capsule Take 1 capsule by mouth 2 (Two) Times a Day.  Patient not taking: Reported on 11/15/2023  9/17/23   Fouzia West DO   cetirizine (zyrTEC) 10 MG tablet Take 1 tablet by mouth Daily. 8/4/23   Fouzia West DO   clonazePAM (KlonoPIN) 0.5 MG tablet Take 2 tablets by mouth 2 (Two) Times a Day. One in the am and half in the pm, half a bed time. 8/24/22   Bertram Charles MD   cyclobenzaprine (FLEXERIL) 10 MG tablet TAKE 1 TABLET BY MOUTH THREE TIMES DAILY AS NEEDED FOR MUSCLE SPASMS 10/30/23   Fouzia West DO   diclofenac (VOLTAREN) 75 MG EC tablet Take 1 tablet by mouth 2 (Two) Times a Day. 8/4/23   Fouzia West DO   Diclofenac Sodium 3 % gel gel Apply 3.2 g topically to the appropriate area as directed 5 (Five) Times a Day As Needed (PAIN). for 60 days. 11/17/22   Fouzia West DO   fluconazole (DIFLUCAN) 150 MG tablet Take 1 tablet by mouth 1 (One) Time Per Week. 11/17/23   Karol Boyce MD   fluticasone (FLONASE) 50 MCG/ACT nasal spray 1 spray by Each Nare route 2 (Two) Times a Day. 6/18/21   Bertram Charles MD   furosemide (LASIX) 40 MG tablet Take 0.5 tablets by mouth 2 (Two) Times a Day. 8/4/23   Fouzia West DO   guaiFENesin (Mucinex) 600 MG 12 hr tablet Take 2 tablets by mouth 2 (Two) Times a Day. 11/29/22   Fouzia West DO   HYDROcod Polst-CPM Polst ER (TUSSIONEX PENNKINETIC) 10-8 MG/5ML ER suspension TAKE 1 TEASPOONFUL BY MOUTH TWICE DAILY AS NEEDED  Patient taking differently: Take 5 mL by mouth Every 12 (Twelve) Hours As Needed. 4/26/22   Fouzia West DO   HYDROcodone-acetaminophen (NORCO) 7.5-325 MG per tablet Take 1 tablet by mouth Every 6 (Six) Hours As Needed for Moderate Pain. 10/12/23   Fouzia West DO   ipratropium-albuterol (DUO-NEB) 0.5-2.5 mg/3 ml nebulizer Take 3 mL by nebulization 4 (Four) Times a Day. 6/3/22   Bassem Azevedo MD   losartan (COZAAR) 25 MG tablet TAKE 1 TABLET BY MOUTH ONCE DAILY FOR BLOOD PRESSURE 7/13/23   Fouzia West DO   magnesium oxide (MAG-OX) 400 MG tablet Take 1 tablet by mouth Daily. 2/6/23   Fouzia West DO    montelukast (SINGULAIR) 10 MG tablet Take 1 tablet qhs 23   Fouzia West DO   nystatin (MYCOSTATIN) 100,000 unit/mL suspension Swish and swallow 5 mL 4 (Four) Times a Day. 11/15/23   Susana Aranda APRN   promethazine (PHENERGAN) 12.5 MG tablet Take 1 tablet by mouth Every 6 (Six) Hours As Needed for Nausea or Vomiting. 11/15/23   Susana Aranda APRN   Qnasl 80 MCG/ACT aerosol solution USE ONE SPRAY INTO NOSTRIL(S) AS DIRECTED BY PROVIDER 22   Fouzia West DO   QUEtiapine fumarate ER (SEROquel XR) 50 MG tablet sustained-release 24 hour tablet  22   Bertram Charles MD   QUEtiapine XR (SEROquel XR) 300 MG 24 hr tablet  22   Bertram Charles MD   roflumilast (DALIRESP) 500 MCG tablet tablet Take 1 tablet by mouth Daily. 23   Fouzia West DO   tamsulosin (FLOMAX) 0.4 MG capsule 24 hr capsule Take 1 capsule by mouth Daily. 9/15/23   Fouzia West DO   vitamin B-12 (CYANOCOBALAMIN) 100 MCG tablet Take 0.5 tablets by mouth Daily. 23   Fouzia West DO   vitamin D (ERGOCALCIFEROL) 1.25 MG (81087 UT) capsule capsule TAKE 1 CAPSULE BY MOUTH 1 (ONE) TIME PER WEEK. 23   Fouzia West DO   Vortioxetine HBr (TRINTELLIX) 10 MG tablet tablet Take 1 tablet by mouth Daily With Breakfast.    Provider, MD Bertram        Social History:   Social History     Tobacco Use    Smoking status: Former     Packs/day: 1.00     Years: 30.00     Additional pack years: 0.00     Total pack years: 30.00     Types: Cigarettes     Start date: 1980     Quit date: 2023     Years since quittin.8     Passive exposure: Past    Smokeless tobacco: Never    Tobacco comments:     no second hand smoke exposure. Has not somked for over 2 weeks   Vaping Use    Vaping Use: Never used   Substance Use Topics    Alcohol use: Never    Drug use: Never         Review of Systems:  Review of Systems   Constitutional:  Positive for fever (Subjective). Negative for chills.   HENT:  Positive for  "sore throat and trouble swallowing. Negative for congestion and ear pain.    Eyes:  Negative for pain.   Respiratory:  Positive for cough, chest tightness and shortness of breath.    Cardiovascular:  Negative for chest pain.   Gastrointestinal:  Negative for abdominal pain, diarrhea, nausea and vomiting.   Genitourinary:  Negative for flank pain and hematuria.   Musculoskeletal:  Negative for joint swelling.   Skin:  Negative for pallor.   Neurological:  Negative for seizures and headaches.   All other systems reviewed and are negative.       Physical Exam:  /90   Pulse 110   Temp 98.1 °F (36.7 °C) (Oral)   Resp 24   Ht 162.6 cm (64\")   Wt 89.3 kg (196 lb 13.9 oz)   SpO2 97%   BMI 33.79 kg/m²     Physical Exam    Vital signs were reviewed under triage note.  General appearance - Patient appears well-developed and well-nourished.  Patient is in no acute distress.  Head - Normocephalic, atraumatic.  Pupils - Equal, round, reactive to light.  Extraocular muscles are intact.  Conjunctiva is clear.  Nasal - Normal inspection.  No evidence of trauma or epistaxis.  Tympanic membranes - Gray, intact without erythema or retractions.  Oral mucosa - Pink and moist without lesions or erythema.  Uvula is midline.  Chest wall - Atraumatic.  Chest wall is nontender.  There are no vesicular rashes noted.  Neck - Supple.  Trachea was midline.  There is no palpable lymphadenopathy or thyromegaly.  There are no meningeal signs  Lungs - Auscultation reveals a few scattered expiratory wheezes.  Heart - Regular rate and rhythm without any murmurs, clicks, or gallops.  Abdomen - Soft.  Bowel sounds are present.  There is no palpable tenderness.  There is no rebound, guarding, or rigidity.  There are no palpable masses.  There are no pulsatile masses.  Back - Spine is straight and midline.  There is no CVA tenderness.  Extremities - Intact x4 with full range of motion.  There is no palpable edema.  Pulses are intact x4 and " equal.  Neurologic - Patient is awake, alert, and oriented x3.  Cranial nerves II through XII are grossly intact.  Motor and sensory functions grossly intact.  Cerebellar function was normal.  Integument - There are no rashes.  There are no petechia or purpura lesions noted.  There are no vesicular lesions noted.           Procedures:  Procedures      Medical Decision Making:      Comorbidities that affect care:    COPD, hypertension, anxiety, depression, diverticulitis, IBS, hyperlipidemia    External Notes reviewed:    Previous Clinic Note: Office visit with DESTINY Correa on 11/15/2023 was reviewed by me.      The following orders were placed and all results were independently analyzed by me:  Orders Placed This Encounter   Procedures    Blood Culture - Blood,    Blood Culture - Blood,    XR Chest 1 View    [place] Draw    Comprehensive Metabolic Panel    BNP    Single High Sensitivity Troponin T    CBC Auto Differential    Lactic Acid, Plasma    Undress & Gown    Vital Signs    Undress & Gown    Continuous Pulse Oximetry    Vital Signs    ECG 12 Lead ED Triage Standing Order; SOA    CBC & Differential    Green Top (Gel)    Lavender Top    Gold Top - SST    Light Blue Top       Medications Given in the Emergency Department:  Medications   aluminum-magnesium hydroxide-simethicone (MAALOX MAX) 400-400-40 MG/5ML suspension 15 mL (15 mL Oral Given 11/26/23 1538)   cefTRIAXone (ROCEPHIN) 2000 mg/100 mL 0.9% NS IVPB (MBP) (0 mg Intravenous Stopped 11/26/23 1615)   AZITHROMYCIN 500 MG/250 ML 0.9% NS IVPB (vial-mate) (0 mg Intravenous Stopped 11/26/23 1718)   methylPREDNISolone sodium succinate (SOLU-Medrol) injection 125 mg (125 mg Intravenous Given 11/26/23 1538)   ipratropium-albuterol (DUO-NEB) nebulizer solution 6 mL (6 mL Nebulization Given 11/26/23 1454)   prochlorperazine (COMPAZINE) injection 10 mg (10 mg Intravenous Given 11/26/23 1649)        ED Course:     The patient was seen and evaluated in the ED  by me.  The above history and physical examination was performed as documented.  Diagnostic data was obtained.  Results reviewed.  On the patient's respiratory standpoint she was satting 97% on room air.  There is no signs of pneumonia.  Explained the patient she most likely has a bronchitis exacerbating her COPD together.  Patient despite having-present pulse ox was upset stating she cannot breathe.  Patient was given a dose of Solu-Medrol and 2 DuoNeb breathing treatments.  Patient did get tachycardic with the breathing treatments.  Patient was also very concerned about her swallowing.  I explained to her that on physical examination there is no obvious physical evidence to suggest thrush however she could have thrush further into her esophagus causing problems.  Explained to her to that she may very well have some esophagitis or even severe GERD causing her symptoms.  I recommend that she follow-up with gastroenterologist and have an EGD or talk to her PCP about having an outpatient EGD performed.  At the patient's request I did consult our hospitalist service.  I spoke with Dr. Velasco.  She states that there is no criteria to justify acute hospitalization.  I did talk to the patient about getting a CT scan of her chest to rule out the possibility of other chest pathology for either her swallowing and/or her breathing symptoms but this was declined.  Patient also refused half of her antibiotic therapy and requested discharge.  Patient was advised to follow-up with her PCP this week for close outpatient follow-up.    Labs:    Lab Results (last 24 hours)       ** No results found for the last 24 hours. **             Imaging:    No Radiology Exams Resulted Within Past 24 Hours      Differential Diagnosis and Discussion:    Cough: Differential diagnosis includes but is not limited to pneumonia, acute bronchitis, upper respiratory infection, ACE inhibitor use, allergic reaction, epiglottitis, seasonal allergies,  chemical irritants, exercise-induced asthma, viral syndrome.  Dyspnea: Differential diagnosis includes but is not limited to metabolic acidosis, neurological disorders, psychogenic, asthma, pneumothorax, upper airway obstruction, COPD, pneumonia, noncardiogenic pulmonary edema, interstitial lung disease, anemia, congestive heart failure, and pulmonary embolism  Sore Throat: Differential diagnosis includes but is not limited to bacterial infection, viral infection, inhaled irritants, sinus drainage, thyroiditis, epiglottitis, and retropharyngeal abscess.    All labs were reviewed and interpreted by me.  All X-rays impressions were independently interpreted by me.  EKG was interpreted by me.    MDM     Amount and/or Complexity of Data Reviewed  Clinical lab tests: reviewed  Tests in the radiology section of CPT®: reviewed  Tests in the medicine section of CPT®: reviewed             Patient Care Considerations:    CT CHEST: I considered ordering a CT scan of the chest, however this was ultimately declined.      Consultants/Shared Management Plan:    [facility] that the patient can be safely discharged with close follow up.    Social Determinants of Health:    Patient is independent, reliable, and has access to care.       Disposition and Care Coordination:    Discharged: I considered escalation of care by admitting this patient for observation, however the patient has improved and is suitable and  stable for discharge.    I have explained the patient´s condition, diagnoses and treatment plan based on the information available to me at this time. I have answered questions and addressed any concerns. The patient has a good  understanding of the patient´s diagnosis, condition, and treatment plan as can be expected at this point. The vital signs have been stable. The patient´s condition is stable and appropriate for discharge from the emergency department.       The patient will pursue further outpatient evaluation with the primary care physician or other designated or consulting physician as outlined in the discharge instructions. They are agreeable to this plan of care and follow-up instructions have been explained in detail. The patient has received these instructions in written format and have expressed an understanding of the discharge instructions. The patient is aware that any significant change in condition or worsening of symptoms should prompt an immediate return to this or the closest emergency department or call to 911.  I have explained discharge medications and the need for follow up with the patient/caretakers. This was also printed in the discharge instructions. Patient was discharged with the following medications and follow up:      Medication List        New Prescriptions      doxycycline 100 MG capsule  Commonly known as: MONODOX  Take 1 capsule by mouth 2 (Two) Times a Day.     pantoprazole 40 MG EC tablet  Commonly known as: PROTONIX  Take 1 tablet by mouth Daily.     sucralfate 1 GM/10ML suspension  Commonly known as: CARAFATE  Take 10 mL by mouth 3 (Three) Times a Day for 30 days.            Changed      HYDROcod Polst-CPM Polst ER 10-8 MG/5ML ER suspension  Commonly known as: TUSSIONEX PENNKINETIC  TAKE 1 TEASPOONFUL BY MOUTH TWICE DAILY AS NEEDED  What changed: See the new instructions.               Where to Get Your Medications        These medications were sent to [facility] - [place], [place] - [address] [facility] [address] [facility] - [phone]  - [phone]  [address] [id number]      Phone: [phone]   doxycycline 100 MG capsule  pantoprazole 40 MG EC tablet  sucralfate 1 GM/10ML suspension      Fouzia West,   [address]  [place] [id number] [phone]    In 5 days         Final diagnoses:   Acute bronchitis, unspecified organism   COPD exacerbation   Esophagitis        ED Disposition       ED  Disposition   Discharge    Condition   Stable    Comment   --               This medical record created using voice recognition software.             Raf Ken DO  11/27/23 6627

## 2023-11-26 NOTE — DISCHARGE INSTRUCTIONS
Rest at home.  Drink plenty of fluids.  Ensure adequate hydration.  Consider electrolyte replacement type drinks.  [place] diet.  Diet as tolerated.  Take the prescriptions as prescribed.  When you take the Carafate suspension do not take any other medications within 60 minutes before after taking the Carafate as the Carafate may block their absorption.  Follow-up with your primary care provider in 3 to 5 days.  Return to the ER for any change or worsening symptoms or any other concerns that may arise.

## 2023-11-27 RX ORDER — ALBUTEROL SULFATE 90 UG/1
2 AEROSOL, METERED RESPIRATORY (INHALATION) 4 TIMES DAILY
Qty: 18 G | Refills: 0 | Status: SHIPPED | OUTPATIENT
Start: 2023-11-27

## 2023-12-01 LAB
BACTERIA SPEC AEROBE CULT: NORMAL
BACTERIA SPEC AEROBE CULT: NORMAL

## 2023-12-03 LAB
QT INTERVAL: 370 MS
QTC INTERVAL: 461 MS

## 2023-12-19 RX ORDER — ALBUTEROL SULFATE 90 UG/1
2 AEROSOL, METERED RESPIRATORY (INHALATION) 4 TIMES DAILY PRN
Qty: 8.5 G | Refills: 0 | Status: SHIPPED | OUTPATIENT
Start: 2023-12-19

## 2024-01-26 RX ORDER — CYCLOBENZAPRINE HCL 10 MG
10 TABLET ORAL 3 TIMES DAILY PRN
Qty: 90 TABLET | Refills: 0 | Status: SHIPPED | OUTPATIENT
Start: 2024-01-26

## 2024-03-01 RX ORDER — ERGOCALCIFEROL 1.25 MG/1
50000 CAPSULE ORAL WEEKLY
Qty: 5 CAPSULE | Refills: 3 | Status: SHIPPED | OUTPATIENT
Start: 2024-03-01

## 2024-03-07 ENCOUNTER — TRANSCRIBE ORDERS (OUTPATIENT)
Dept: ADMINISTRATIVE | Facility: HOSPITAL | Age: 64
End: 2024-03-07
Payer: MEDICARE

## 2024-03-07 DIAGNOSIS — I73.9 PVD (PERIPHERAL VASCULAR DISEASE): Primary | ICD-10-CM

## 2024-03-07 DIAGNOSIS — R09.89 CAROTID BRUIT, UNSPECIFIED LATERALITY: ICD-10-CM

## 2024-04-01 ENCOUNTER — HOSPITAL ENCOUNTER (OUTPATIENT)
Dept: CARDIOLOGY | Facility: HOSPITAL | Age: 64
Discharge: HOME | End: 2024-04-01
Payer: MEDICARE

## 2024-04-01 DIAGNOSIS — R09.89 CAROTID BRUIT, UNSPECIFIED LATERALITY: ICD-10-CM

## 2024-04-01 DIAGNOSIS — I73.9 PVD (PERIPHERAL VASCULAR DISEASE): ICD-10-CM

## 2024-04-01 LAB
BH CV LOWER ARTERIAL LEFT ABI RATIO: 1.17
BH CV LOWER ARTERIAL LEFT DORSALIS PEDIS SYS MAX: 169
BH CV LOWER ARTERIAL LEFT GREAT TOE SYS MAX: 169
BH CV LOWER ARTERIAL LEFT POST TIBIAL SYS MAX: 195
BH CV LOWER ARTERIAL LEFT TBI RATIO: 0.93
BH CV LOWER ARTERIAL RIGHT ABI RATIO: 0.59
BH CV LOWER ARTERIAL RIGHT DORSALIS PEDIS SYS MAX: 104
BH CV LOWER ARTERIAL RIGHT GREAT TOE SYS MAX: 94
BH CV LOWER ARTERIAL RIGHT POST TIBIAL SYS MAX: 106
BH CV LOWER ARTERIAL RIGHT TBI RATIO: 0.52
BH CV XLRA MEAS LEFT CAROTID BULB EDV: 27 CM/SEC
BH CV XLRA MEAS LEFT CAROTID BULB PSV: 113 CM/SEC
BH CV XLRA MEAS LEFT DIST CCA EDV: 18.2 CM/SEC
BH CV XLRA MEAS LEFT DIST CCA PSV: 72.9 CM/SEC
BH CV XLRA MEAS LEFT DIST ICA EDV: -16.1 CM/SEC
BH CV XLRA MEAS LEFT DIST ICA PSV: -81.3 CM/SEC
BH CV XLRA MEAS LEFT ICA/CCA RATIO: -1.18
BH CV XLRA MEAS LEFT MID ICA EDV: -17.5 CM/SEC
BH CV XLRA MEAS LEFT MID ICA PSV: -83.4 CM/SEC
BH CV XLRA MEAS LEFT PROX CCA EDV: 21 CM/SEC
BH CV XLRA MEAS LEFT PROX CCA PSV: 77.1 CM/SEC
BH CV XLRA MEAS LEFT PROX ECA EDV: -13.7 CM/SEC
BH CV XLRA MEAS LEFT PROX ECA PSV: -83.2 CM/SEC
BH CV XLRA MEAS LEFT PROX ICA EDV: -23.1 CM/SEC
BH CV XLRA MEAS LEFT PROX ICA PSV: -86.2 CM/SEC
BH CV XLRA MEAS LEFT VERTEBRAL A EDV: 11.8 CM/SEC
BH CV XLRA MEAS LEFT VERTEBRAL A PSV: 60.9 CM/SEC
BH CV XLRA MEAS RIGHT CAROTID BULB EDV: 16 CM/SEC
BH CV XLRA MEAS RIGHT CAROTID BULB PSV: 85 CM/SEC
BH CV XLRA MEAS RIGHT DIST CCA EDV: 14.3 CM/SEC
BH CV XLRA MEAS RIGHT DIST CCA PSV: 55.9 CM/SEC
BH CV XLRA MEAS RIGHT DIST ICA EDV: -18 CM/SEC
BH CV XLRA MEAS RIGHT DIST ICA PSV: -90.1 CM/SEC
BH CV XLRA MEAS RIGHT ICA/CCA RATIO: -1.32
BH CV XLRA MEAS RIGHT MID ICA EDV: -18 CM/SEC
BH CV XLRA MEAS RIGHT MID ICA PSV: -73.9 CM/SEC
BH CV XLRA MEAS RIGHT PROX CCA EDV: 16.8 CM/SEC
BH CV XLRA MEAS RIGHT PROX CCA PSV: 65.9 CM/SEC
BH CV XLRA MEAS RIGHT PROX ECA EDV: -11.2 CM/SEC
BH CV XLRA MEAS RIGHT PROX ECA PSV: -83.2 CM/SEC
BH CV XLRA MEAS RIGHT PROX ICA EDV: -14.3 CM/SEC
BH CV XLRA MEAS RIGHT PROX ICA PSV: -59 CM/SEC
BH CV XLRA MEAS RIGHT VERTEBRAL A EDV: 18.9 CM/SEC
BH CV XLRA MEAS RIGHT VERTEBRAL A PSV: 112.1 CM/SEC
LEFT ARM BP: NORMAL MMHG
RIGHT ARM BP: NORMAL MMHG
UPPER ARTERIAL LEFT ARM BRACHIAL SYS MAX: 167
UPPER ARTERIAL RIGHT ARM BRACHIAL SYS MAX: 181

## 2024-04-01 PROCEDURE — 93880 EXTRACRANIAL BILAT STUDY: CPT | Performed by: SURGERY

## 2024-04-01 PROCEDURE — 93880 EXTRACRANIAL BILAT STUDY: CPT

## 2024-04-01 PROCEDURE — 93922 UPR/L XTREMITY ART 2 LEVELS: CPT

## 2024-04-01 PROCEDURE — 93922 UPR/L XTREMITY ART 2 LEVELS: CPT | Performed by: SURGERY

## 2024-04-05 ENCOUNTER — TELEPHONE (OUTPATIENT)
Dept: FAMILY MEDICINE CLINIC | Facility: CLINIC | Age: 64
End: 2024-04-05
Payer: MEDICARE

## 2024-04-05 NOTE — TELEPHONE ENCOUNTER
Caller: Callie Basilio    Relationship: Self  Best call back number: [phone]    What form or medical record are you requesting: PRINTOUT OF COPAYS PAID IN THE YEAR 2023    Who is requesting this form or medical record from you: PERSONAL RECORDS     How would you like to receive the form or medical records (pick-up, mail, fax):   If fax, what is the fax number:   If mail, what is the address: TO ADDRESS ON FILE  If pick-up, provide patient with address and location details

## 2024-04-11 RX ORDER — ATORVASTATIN CALCIUM 20 MG/1
20 TABLET, FILM COATED ORAL NIGHTLY
Qty: 90 TABLET | Refills: 0 | Status: SHIPPED | OUTPATIENT
Start: 2024-04-11

## 2024-04-11 RX ORDER — LOSARTAN POTASSIUM 25 MG/1
TABLET ORAL
Qty: 90 TABLET | Refills: 0 | Status: SHIPPED | OUTPATIENT
Start: 2024-04-11

## 2024-08-15 NOTE — ADDENDUM NOTE
Addended by: LUCY ORTA on: 2/12/2023 05:40 PM     Modules accepted: Orders     What Type Of Note Output Would You Prefer (Optional)?: Bullet Format How Severe Is Your Skin Lesion?: mild Has Your Skin Lesion Been Treated?: not been treated Is This A New Presentation, Or A Follow-Up?: Skin Lesion

## 2024-09-05 RX ORDER — FLUCONAZOLE 150 MG/1
TABLET ORAL
Qty: 20 TABLET | Refills: 1 | OUTPATIENT
Start: 2024-09-05

## 2024-10-06 NOTE — TELEPHONE ENCOUNTER
As Tolerated.      Keep your plan you discussed with MD: get your Vivitrol shot and talk to a therapist to help quit drinking. Follow up with psychology tomorrow as previously discussed.   Having trush, nystatin called in.

## 2024-10-10 ENCOUNTER — TRANSCRIBE ORDERS (OUTPATIENT)
Dept: ADMINISTRATIVE | Facility: HOSPITAL | Age: 64
End: 2024-10-10
Payer: MEDICARE

## 2024-10-10 DIAGNOSIS — I73.9 PERIPHERAL VASCULAR DISEASE, UNSPECIFIED: Primary | ICD-10-CM

## 2024-10-22 ENCOUNTER — TRANSCRIBE ORDERS (OUTPATIENT)
Dept: ADMINISTRATIVE | Facility: HOSPITAL | Age: 64
End: 2024-10-22
Payer: MEDICARE

## 2024-10-22 DIAGNOSIS — Z12.31 ENCOUNTER FOR SCREENING MAMMOGRAM FOR BREAST CANCER: Primary | ICD-10-CM

## 2024-10-31 ENCOUNTER — HOSPITAL ENCOUNTER (OUTPATIENT)
Dept: MAMMOGRAPHY | Facility: HOSPITAL | Age: 64
Discharge: HOME | End: 2024-10-31
Admitting: FAMILY MEDICINE
Payer: MEDICARE

## 2024-10-31 ENCOUNTER — TRANSCRIBE ORDERS (OUTPATIENT)
Dept: ADMINISTRATIVE | Facility: HOSPITAL | Age: 64
End: 2024-10-31
Payer: MEDICARE

## 2024-10-31 DIAGNOSIS — Z12.31 ENCOUNTER FOR SCREENING MAMMOGRAM FOR BREAST CANCER: ICD-10-CM

## 2024-10-31 PROCEDURE — 77067 SCR MAMMO BI INCL CAD: CPT

## 2024-10-31 PROCEDURE — 77063 BREAST TOMOSYNTHESIS BI: CPT

## 2024-11-05 ENCOUNTER — HOSPITAL ENCOUNTER (OUTPATIENT)
Dept: CARDIOLOGY | Facility: HOSPITAL | Age: 64
Discharge: HOME | End: 2024-11-05
Admitting: NURSE PRACTITIONER
Payer: MEDICARE

## 2024-11-05 DIAGNOSIS — I73.9 PERIPHERAL VASCULAR DISEASE, UNSPECIFIED: ICD-10-CM

## 2024-11-05 LAB
BH CV LOWER ARTERIAL LEFT ABI RATIO: 1.19
BH CV LOWER ARTERIAL LEFT DORSALIS PEDIS SYS MAX: 217
BH CV LOWER ARTERIAL LEFT GREAT TOE SYS MAX: 175
BH CV LOWER ARTERIAL LEFT POST TIBIAL SYS MAX: 220
BH CV LOWER ARTERIAL LEFT TBI RATIO: 0.95
BH CV LOWER ARTERIAL RIGHT ABI RATIO: 0.65
BH CV LOWER ARTERIAL RIGHT DORSALIS PEDIS SYS MAX: 115
BH CV LOWER ARTERIAL RIGHT GREAT TOE SYS MAX: 90
BH CV LOWER ARTERIAL RIGHT POST TIBIAL SYS MAX: 121
BH CV LOWER ARTERIAL RIGHT TBI RATIO: 0.49
UPPER ARTERIAL LEFT ARM BRACHIAL SYS MAX: 175
UPPER ARTERIAL RIGHT ARM BRACHIAL SYS MAX: 185

## 2024-11-05 PROCEDURE — 93922 UPR/L XTREMITY ART 2 LEVELS: CPT

## 2025-04-09 RX ORDER — ERGOCALCIFEROL 1.25 MG/1
50000 CAPSULE, LIQUID FILLED ORAL WEEKLY
Qty: 12 CAPSULE | Refills: 2 | OUTPATIENT
Start: 2025-04-09

## 2025-04-17 RX ORDER — ATORVASTATIN CALCIUM 20 MG/1
20 TABLET, FILM COATED ORAL NIGHTLY
Qty: 90 TABLET | Refills: 0 | OUTPATIENT
Start: 2025-04-17

## 2025-05-13 ENCOUNTER — TRANSCRIBE ORDERS (OUTPATIENT)
Dept: ADMINISTRATIVE | Facility: HOSPITAL | Age: 65
End: 2025-05-13
Payer: MEDICARE

## 2025-05-13 DIAGNOSIS — I73.9 PERIPHERAL VASCULAR DISEASE, UNSPECIFIED: Primary | ICD-10-CM

## 2025-06-02 ENCOUNTER — HOSPITAL ENCOUNTER (OUTPATIENT)
Dept: CARDIOLOGY | Facility: HOSPITAL | Age: 65
Discharge: HOME | End: 2025-06-02
Admitting: NURSE PRACTITIONER
Payer: MEDICARE

## 2025-06-02 DIAGNOSIS — I73.9 PERIPHERAL VASCULAR DISEASE, UNSPECIFIED: ICD-10-CM

## 2025-06-02 LAB
BH CV LOWER ARTERIAL LEFT ABI RATIO: 1.21
BH CV LOWER ARTERIAL LEFT DORSALIS PEDIS SYS MAX: 198
BH CV LOWER ARTERIAL LEFT GREAT TOE SYS MAX: 133
BH CV LOWER ARTERIAL LEFT POST TIBIAL SYS MAX: 201
BH CV LOWER ARTERIAL LEFT TBI RATIO: 0.8
BH CV LOWER ARTERIAL RIGHT ABI RATIO: 0.61
BH CV LOWER ARTERIAL RIGHT DORSALIS PEDIS SYS MAX: 102
BH CV LOWER ARTERIAL RIGHT GREAT TOE SYS MAX: 80
BH CV LOWER ARTERIAL RIGHT POST TIBIAL SYS MAX: 101
BH CV LOWER ARTERIAL RIGHT TBI RATIO: 0.48
UPPER ARTERIAL LEFT ARM BRACHIAL SYS MAX: 166
UPPER ARTERIAL RIGHT ARM BRACHIAL SYS MAX: 147

## 2025-06-02 PROCEDURE — 93922 UPR/L XTREMITY ART 2 LEVELS: CPT

## 2025-06-02 PROCEDURE — 93922 UPR/L XTREMITY ART 2 LEVELS: CPT | Performed by: SURGERY

## 2025-07-23 ENCOUNTER — TRANSCRIBE ORDERS (OUTPATIENT)
Dept: ADMINISTRATIVE | Facility: HOSPITAL | Age: 65
End: 2025-07-23
Payer: MEDICARE

## 2025-07-23 DIAGNOSIS — R29.810 FACIAL DROOP: Primary | ICD-10-CM

## 2025-07-23 DIAGNOSIS — R41.0 CONFUSION: ICD-10-CM

## 2025-07-23 DIAGNOSIS — R47.81 SLURRED SPEECH: ICD-10-CM

## 2025-07-24 ENCOUNTER — HOSPITAL ENCOUNTER (OUTPATIENT)
Dept: CT IMAGING | Facility: HOSPITAL | Age: 65
Discharge: HOME | End: 2025-07-24
Admitting: NURSE PRACTITIONER
Payer: MEDICARE

## 2025-07-24 DIAGNOSIS — R41.0 CONFUSION: ICD-10-CM

## 2025-07-24 DIAGNOSIS — R47.81 SLURRED SPEECH: ICD-10-CM

## 2025-07-24 DIAGNOSIS — R29.810 FACIAL DROOP: ICD-10-CM

## 2025-07-24 PROCEDURE — 70450 CT HEAD/BRAIN W/O DYE: CPT

## 2025-08-08 RX ORDER — ATORVASTATIN CALCIUM 20 MG/1
20 TABLET, FILM COATED ORAL NIGHTLY
Qty: 90 TABLET | Refills: 0 | OUTPATIENT
Start: 2025-08-08